# Patient Record
Sex: FEMALE | Race: WHITE | NOT HISPANIC OR LATINO | Employment: OTHER | URBAN - METROPOLITAN AREA
[De-identification: names, ages, dates, MRNs, and addresses within clinical notes are randomized per-mention and may not be internally consistent; named-entity substitution may affect disease eponyms.]

---

## 2017-02-01 ENCOUNTER — HOSPITAL ENCOUNTER (OUTPATIENT)
Dept: GENERAL RADIOLOGY | Facility: HOSPITAL | Age: 82
Discharge: HOME | End: 2017-02-01
Attending: ORTHOPAEDIC SURGERY | Admitting: ORTHOPAEDIC SURGERY

## 2017-02-01 ENCOUNTER — TRANSCRIBE ORDERS (OUTPATIENT)
Dept: GENERAL RADIOLOGY | Facility: HOSPITAL | Age: 82
End: 2017-02-01

## 2017-02-01 DIAGNOSIS — M25.561 ACUTE PAIN OF RIGHT KNEE: Primary | ICD-10-CM

## 2017-02-01 PROCEDURE — 73562 X-RAY EXAM OF KNEE 3: CPT

## 2017-05-01 RX ORDER — RANITIDINE 150 MG/1
TABLET ORAL
Qty: 90 TABLET | Refills: 3 | Status: SHIPPED | OUTPATIENT
Start: 2017-05-01 | End: 2018-01-27 | Stop reason: SDUPTHER

## 2017-06-08 ENCOUNTER — OFFICE VISIT (OUTPATIENT)
Dept: INTERNAL MEDICINE | Facility: CLINIC | Age: 82
End: 2017-06-08

## 2017-06-08 VITALS
TEMPERATURE: 97.1 F | HEART RATE: 64 BPM | SYSTOLIC BLOOD PRESSURE: 120 MMHG | OXYGEN SATURATION: 97 % | WEIGHT: 131.5 LBS | BODY MASS INDEX: 22.45 KG/M2 | HEIGHT: 64 IN | DIASTOLIC BLOOD PRESSURE: 66 MMHG

## 2017-06-08 DIAGNOSIS — E55.9 VITAMIN D DEFICIENCY: ICD-10-CM

## 2017-06-08 DIAGNOSIS — M19.90 ARTHRITIS: ICD-10-CM

## 2017-06-08 DIAGNOSIS — E03.8 OTHER SPECIFIED HYPOTHYROIDISM: Primary | ICD-10-CM

## 2017-06-08 DIAGNOSIS — K21.00 GASTROESOPHAGEAL REFLUX DISEASE WITH ESOPHAGITIS: ICD-10-CM

## 2017-06-08 LAB
25(OH)D3+25(OH)D2 SERPL-MCNC: 61.1 NG/ML
ALBUMIN SERPL-MCNC: 3.9 G/DL (ref 3.5–5)
ALBUMIN/GLOB SERPL: 1.3 G/DL (ref 1–2)
ALP SERPL-CCNC: 68 U/L (ref 38–126)
ALT SERPL-CCNC: 28 U/L (ref 13–69)
AST SERPL-CCNC: 27 U/L (ref 15–46)
BASOPHILS # BLD AUTO: 0.06 10*3/MM3 (ref 0–0.2)
BASOPHILS NFR BLD AUTO: 1 % (ref 0–2.5)
BILIRUB SERPL-MCNC: 0.6 MG/DL (ref 0.2–1.3)
BUN SERPL-MCNC: 22 MG/DL (ref 7–20)
BUN/CREAT SERPL: 24.4 (ref 7.1–23.5)
CALCIUM SERPL-MCNC: 10.2 MG/DL (ref 8.4–10.2)
CHLORIDE SERPL-SCNC: 103 MMOL/L (ref 98–107)
CO2 SERPL-SCNC: 27 MMOL/L (ref 26–30)
CREAT SERPL-MCNC: 0.9 MG/DL (ref 0.6–1.3)
EOSINOPHIL # BLD AUTO: 0.43 10*3/MM3 (ref 0–0.7)
EOSINOPHIL NFR BLD AUTO: 7.2 % (ref 0–7)
ERYTHROCYTE [DISTWIDTH] IN BLOOD BY AUTOMATED COUNT: 14.5 % (ref 11.5–14.5)
GLOBULIN SER CALC-MCNC: 2.9 GM/DL
GLUCOSE SERPL-MCNC: 88 MG/DL (ref 74–98)
HCT VFR BLD AUTO: 40.7 % (ref 37–47)
HGB BLD-MCNC: 13.1 G/DL (ref 12–16)
IMM GRANULOCYTES # BLD: 0.04 10*3/MM3 (ref 0–0.06)
IMM GRANULOCYTES NFR BLD: 0.7 % (ref 0–0.6)
LYMPHOCYTES # BLD AUTO: 1.56 10*3/MM3 (ref 0.6–3.4)
LYMPHOCYTES NFR BLD AUTO: 26 % (ref 10–50)
MCH RBC QN AUTO: 30.9 PG (ref 27–31)
MCHC RBC AUTO-ENTMCNC: 32.2 G/DL (ref 30–37)
MCV RBC AUTO: 96 FL (ref 81–99)
MONOCYTES # BLD AUTO: 0.65 10*3/MM3 (ref 0–0.9)
MONOCYTES NFR BLD AUTO: 10.8 % (ref 0–12)
NEUTROPHILS # BLD AUTO: 3.27 10*3/MM3 (ref 2–6.9)
NEUTROPHILS NFR BLD AUTO: 54.3 % (ref 37–80)
NRBC BLD AUTO-RTO: 0 /100 WBC (ref 0–0)
PLATELET # BLD AUTO: 269 10*3/MM3 (ref 130–400)
POTASSIUM SERPL-SCNC: 4.3 MMOL/L (ref 3.5–5.1)
PROT SERPL-MCNC: 6.8 G/DL (ref 6.3–8.2)
RBC # BLD AUTO: 4.24 10*6/MM3 (ref 4.2–5.4)
SODIUM SERPL-SCNC: 140 MMOL/L (ref 137–145)
T4 FREE SERPL-MCNC: 1.52 NG/DL (ref 0.78–2.19)
TSH SERPL DL<=0.005 MIU/L-ACNC: 2.91 MIU/ML (ref 0.47–4.68)
WBC # BLD AUTO: 6.01 10*3/MM3 (ref 4.8–10.8)

## 2017-06-08 PROCEDURE — 99214 OFFICE O/P EST MOD 30 MIN: CPT | Performed by: INTERNAL MEDICINE

## 2017-06-08 NOTE — PROGRESS NOTES
"Chief Complaint   Patient presents with   • Follow-up     6 months for hypothyroidism and GERD. No new complaints.      Subjective   Malika BEN Rodney is a 81 y.o. female.     HPI Comments: PMH of hypothyroid, GERD, vitD def, DJD and goiter.   She is currently on vitD 1200mg daily.   No CP or SOB. Some edema in ankles.   She eats a lot of healthy food.   Zantac is working well for her GERD.   She takes her thyroid med in Am on empty stomach.  She has no difficulty.   She saw Dr Manley for her right knee pain.  She has had two steroid injections and it is helpful.    She walks some every day at the NH when she walks with her .  No current allergies.        The following portions of the patient's history were reviewed and updated as appropriate: allergies, current medications, past family history, past medical history, past social history, past surgical history and problem list.    Review of Systems   Respiratory: Negative for shortness of breath.    Cardiovascular: Positive for leg swelling. Negative for chest pain and palpitations.   Endocrine: Negative for cold intolerance and heat intolerance.   Musculoskeletal: Positive for arthralgias.   Allergic/Immunologic: Negative for environmental allergies.   All other systems reviewed and are negative.      Objective   /66  Pulse 64  Temp 97.1 °F (36.2 °C)  Ht 64\" (162.6 cm)  Wt 131 lb 8 oz (59.6 kg)  SpO2 97%  BMI 22.57 kg/m2  Body mass index is 22.57 kg/(m^2).  Physical Exam   Constitutional: She is oriented to person, place, and time. She appears well-developed and well-nourished.   HENT:   Head: Normocephalic and atraumatic.   Mouth/Throat: Oropharynx is clear and moist.   Eyes: Conjunctivae and EOM are normal. Pupils are equal, round, and reactive to light.   Neck: Normal range of motion. Neck supple. No thyromegaly present.   Cardiovascular: Normal rate, regular rhythm and intact distal pulses.    Systolic murmur grade 2/6 at upper sternal borders " with radiation to carotids   Pulmonary/Chest: Effort normal and breath sounds normal. No respiratory distress.   Abdominal: Soft. Bowel sounds are normal.   Musculoskeletal: She exhibits no edema.   DIP and PIP finger joints with radial deviation due to osteoarthritis   Lymphadenopathy:     She has no cervical adenopathy.   Neurological: She is alert and oriented to person, place, and time. No cranial nerve deficit.   Psychiatric: She has a normal mood and affect. Judgment normal.   Nursing note and vitals reviewed.      Assessment/Plan   Malika Rodney is here today and the following problems have been addressed:      Malika was seen today for follow-up.    Diagnoses and all orders for this visit:    Other specified hypothyroidism  -     T4, Free  -     TSH    Vitamin D deficiency  -     Vitamin D 25 Hydroxy    Gastroesophageal reflux disease with esophagitis  -     CBC & Differential  -     Comprehensive Metabolic Panel    Arthritis  -     CBC & Differential  -     Comprehensive Metabolic Panel    labs as noted  No current med changes unless based on labs  Follow HH diet  Exercise 30 min 5 days a week  Continue turmeric for arthritis pain    RTC  6 mo or prn    Please note that portions of this note were completed with a voice recognition program.  Efforts were made to edit dictation, but occasionally words are mistranscribed.

## 2017-07-31 RX ORDER — LEVOTHYROXINE SODIUM 0.1 MG/1
TABLET ORAL
Qty: 90 TABLET | Refills: 3 | Status: SHIPPED | OUTPATIENT
Start: 2017-07-31 | End: 2018-02-21

## 2017-09-01 ENCOUNTER — OFFICE VISIT (OUTPATIENT)
Dept: INTERNAL MEDICINE | Facility: CLINIC | Age: 82
End: 2017-09-01

## 2017-09-01 DIAGNOSIS — J30.1 SEASONAL ALLERGIC RHINITIS DUE TO POLLEN: Primary | ICD-10-CM

## 2017-09-01 PROCEDURE — 99213 OFFICE O/P EST LOW 20 MIN: CPT | Performed by: INTERNAL MEDICINE

## 2017-09-05 VITALS
HEART RATE: 61 BPM | SYSTOLIC BLOOD PRESSURE: 136 MMHG | DIASTOLIC BLOOD PRESSURE: 72 MMHG | OXYGEN SATURATION: 98 % | TEMPERATURE: 98.1 F

## 2017-10-18 ENCOUNTER — FLU SHOT (OUTPATIENT)
Dept: INTERNAL MEDICINE | Facility: CLINIC | Age: 82
End: 2017-10-18

## 2017-10-18 PROCEDURE — G0008 ADMIN INFLUENZA VIRUS VAC: HCPCS | Performed by: INTERNAL MEDICINE

## 2017-10-18 PROCEDURE — 90662 IIV NO PRSV INCREASED AG IM: CPT | Performed by: INTERNAL MEDICINE

## 2018-01-22 ENCOUNTER — OFFICE VISIT (OUTPATIENT)
Dept: INTERNAL MEDICINE | Facility: CLINIC | Age: 83
End: 2018-01-22

## 2018-01-22 VITALS
WEIGHT: 133.19 LBS | HEART RATE: 72 BPM | DIASTOLIC BLOOD PRESSURE: 70 MMHG | OXYGEN SATURATION: 96 % | TEMPERATURE: 98.9 F | BODY MASS INDEX: 22.74 KG/M2 | RESPIRATION RATE: 16 BRPM | SYSTOLIC BLOOD PRESSURE: 140 MMHG | HEIGHT: 64 IN

## 2018-01-22 DIAGNOSIS — R03.0 ELEVATED BLOOD PRESSURE READING: ICD-10-CM

## 2018-01-22 DIAGNOSIS — J06.9 VIRAL URI WITH COUGH: Primary | ICD-10-CM

## 2018-01-22 PROCEDURE — 99213 OFFICE O/P EST LOW 20 MIN: CPT | Performed by: NURSE PRACTITIONER

## 2018-01-22 NOTE — PROGRESS NOTES
Chief Complaint / Reason:      Chief Complaint   Patient presents with   • Sinus Problem     onset Sat. during drive him from . Afebrile.    • Sore Throat       Subjective     HPI  Patient presents today with sore throat and sinus issues.  Onset was Saturday and states that symptoms started during the drive from [place] and has progressively worsened.  She states that she has been very fatigued and not eating as well her  recently passed away and she has been dealing with his  services and bills. She denies any fever, chest pain, shortness of breath or heart palpitations.  She states that she had been going every day to the nursing home to visit her  and that the flu along with other respiratory infections were going around.  She states while her visit to [place] was very short the air was very cold and her lips have been chapped since.  She has reflected times when talking about her .  She lives alone but she has a daughter and son-in-law and granddaughter that lives here.    History taken from: patient    PMH/FH/Social History were reviewed and updated appropriately in the electronic medical record.     Review of Systems:   Review of Systems   Constitutional: Positive for fatigue. Negative for chills and fever.   HENT: Positive for congestion and sore throat.    Respiratory: Positive for cough.    Cardiovascular: Negative.  Negative for chest pain and palpitations.   Gastrointestinal: Negative.    Musculoskeletal: Negative.    Skin: Negative.    Neurological: Positive for headaches.   Hematological: Negative for adenopathy.   Psychiatric/Behavioral: Positive for dysphoric mood. Negative for self-injury, sleep disturbance and suicidal ideas.     All other systems were reviewed and are negative.  Exceptions are noted in the subjective or above.      Objective     Vital Signs  Vitals:    18 1521   BP: 140/70   Pulse: 72   Resp: 16   Temp: 98.9 °F (37.2 °C)   SpO2: 96%        Body mass index is 22.86 kg/(m^2).    Physical Exam   Constitutional: She is oriented to person, place, and time. She appears well-developed and well-nourished.   HENT:   Head: Normocephalic.   Right Ear: External ear normal.   Left Ear: External ear normal.   Mouth/Throat: Mucous membranes are dry. Posterior oropharyngeal erythema present.   Cardiovascular: Normal rate, regular rhythm, normal heart sounds and intact distal pulses.    Pulmonary/Chest: Effort normal and breath sounds normal.   Abdominal: Soft. Bowel sounds are normal.   Lymphadenopathy:     She has no cervical adenopathy.   Neurological: She is alert and oriented to person, place, and time.   Skin: Skin is warm and dry.   Psychiatric: She has a normal mood and affect. Her behavior is normal. Judgment and thought content normal.   Nursing note and vitals reviewed.    Medication Review:     Current Outpatient Prescriptions:   •  Ascorbic Acid (VITAMIN C) 500 MG capsule, Take  by mouth daily., Disp: , Rfl:   •  Calcium Carb-Cholecalciferol (CALCIUM + D3) 600-200 MG-UNIT tablet, Take  by mouth daily., Disp: , Rfl:   •  Cholecalciferol (VITAMIN D-3) 1000 UNITS capsule, Take  by mouth daily., Disp: , Rfl:   •  levothyroxine (SYNTHROID, LEVOTHROID) 100 MCG tablet, Take 1 tablet by mouth  daily, Disp: 90 tablet, Rfl: 3  •  MULTIPLE VITAMINS PO, Take  by mouth daily., Disp: , Rfl:   •  Naproxen Sodium (ALEVE PO), Take  by mouth., Disp: , Rfl:   •  raNITIdine (ZANTAC) 150 MG tablet, Take 1 tablet by mouth  daily, Disp: 90 tablet, Rfl: 3  •  Turmeric 450 MG capsule, Take  by mouth., Disp: , Rfl:     Assessment/Plan   Malika was seen today for sinus problem and sore throat.    Diagnoses and all orders for this visit:    Viral URI with cough  Treat symptomatically at this time and advised patient to increase water intake and rest.  Recommend patient get adequate nutrition and contact office if symptoms worsen.  Elevated blood pressure reading  Patient  closely monitor blood pressure at home and contact office if blood pressure remains elevated    Will contact patient on Wednesday to see how patient is doing.   Return if symptoms worsen or fail to improve.    Elizabeth Young, APRN  01/22/2018

## 2018-01-24 ENCOUNTER — OFFICE VISIT (OUTPATIENT)
Dept: INTERNAL MEDICINE | Facility: CLINIC | Age: 83
End: 2018-01-24

## 2018-01-24 ENCOUNTER — TELEPHONE (OUTPATIENT)
Dept: INTERNAL MEDICINE | Facility: CLINIC | Age: 83
End: 2018-01-24

## 2018-01-24 VITALS
SYSTOLIC BLOOD PRESSURE: 116 MMHG | OXYGEN SATURATION: 96 % | BODY MASS INDEX: 22.32 KG/M2 | TEMPERATURE: 98.9 F | RESPIRATION RATE: 14 BRPM | HEIGHT: 64 IN | WEIGHT: 130.75 LBS | DIASTOLIC BLOOD PRESSURE: 60 MMHG | HEART RATE: 70 BPM

## 2018-01-24 DIAGNOSIS — J10.1 INFLUENZA A: Primary | ICD-10-CM

## 2018-01-24 LAB
EXPIRATION DATE: ABNORMAL
FLUAV AG NPH QL: ABNORMAL
FLUBV AG NPH QL: ABNORMAL
INTERNAL CONTROL: ABNORMAL
Lab: ABNORMAL

## 2018-01-24 PROCEDURE — 87804 INFLUENZA ASSAY W/OPTIC: CPT | Performed by: NURSE PRACTITIONER

## 2018-01-24 PROCEDURE — 99213 OFFICE O/P EST LOW 20 MIN: CPT | Performed by: NURSE PRACTITIONER

## 2018-01-24 RX ORDER — OSELTAMIVIR PHOSPHATE 75 MG/1
75 CAPSULE ORAL 2 TIMES DAILY
Qty: 10 CAPSULE | Refills: 0 | Status: SHIPPED | OUTPATIENT
Start: 2018-01-24 | End: 2018-01-29

## 2018-01-24 RX ORDER — DEXTROMETHORPHAN HYDROBROMIDE AND PROMETHAZINE HYDROCHLORIDE 15; 6.25 MG/5ML; MG/5ML
5 SYRUP ORAL NIGHTLY PRN
Qty: 180 ML | Refills: 0 | Status: SHIPPED | OUTPATIENT
Start: 2018-01-24 | End: 2018-02-21

## 2018-01-24 NOTE — TELEPHONE ENCOUNTER
Spoke with Sally, she said that pt. can come in for a flu test only, a visit or we can call in meds. Up to her. She asked to schedule an appt.

## 2018-01-24 NOTE — TELEPHONE ENCOUNTER
Checked on pt. Coughing hard, not productive. Stomach muscles are sore. Hard to swallow. Now with fevers of 100-101. Recheck or rx?

## 2018-01-24 NOTE — PROGRESS NOTES
Chief Complaint / Reason:      Chief Complaint   Patient presents with   • Cough     recheck, sx worsening   • Fever     between 100-101   • Sore Throat       Subjective     HPI  She presents today with complaints of cough fever and sore throat.  She states that her fever has been between 100 and 101 and she feels like her cough has been worsening.  She was previously seen on Monday 1/22/18 for sinus issues and cough.  I had instructed her that we will contact her on Wednesday to see if she was feeling any better that I was concerned that she may have the flu but at that point she was afebrile and denied any muscle aches or chills.  Today I had advised her to stop by the office and we would do a flu test but she insisted on being seen.  She denies chest pain, shortness of breath or heart palpitations.  She states that she has been coughing and it is interfering with her sleep.  Patient has not been taking care of herself very well as she has not been eating a whole lot or maintaining hydration but possibly could be related to her psychological stress of recently losing her  in addition to not feeling well.  Patient's vital signs are currently stable.  Patient states that she has taken over-the-counter medications to help relieve symptoms.  The fever started yesterday.   History taken from: patient    PMH/FH/Social History were reviewed and updated appropriately in the electronic medical record.     Review of Systems:   Review of Systems   Constitutional: Positive for fatigue and fever.   HENT: Positive for congestion, sore throat and voice change.    Respiratory: Positive for cough. Negative for shortness of breath and wheezing.    Cardiovascular: Negative for chest pain, palpitations and leg swelling.   Musculoskeletal: Positive for myalgias.   Skin: Negative.      All other systems were reviewed and are negative.  Exceptions are noted in the subjective or above.      Objective     Vital Signs  Vitals:     01/24/18 1357   BP: 116/60   Pulse: 70   Resp: 14   Temp: 98.9 °F (37.2 °C)   SpO2: 96%       Body mass index is 22.44 kg/(m^2).    Physical Exam   Constitutional: She is oriented to person, place, and time. She appears well-developed and well-nourished.   HENT:   Head: Normocephalic.   Right Ear: External ear normal. Tympanic membrane is erythematous and bulging.   Left Ear: External ear normal. Tympanic membrane is erythematous and bulging.   Nose: Mucosal edema present. Right sinus exhibits maxillary sinus tenderness. Left sinus exhibits maxillary sinus tenderness.   Mouth/Throat: Mucous membranes are dry. Posterior oropharyngeal erythema present.   Cardiovascular: Normal rate, regular rhythm, normal heart sounds and intact distal pulses.    Pulmonary/Chest: Effort normal. She has decreased breath sounds in the right lower field and the left lower field. She has no wheezes. She has no rhonchi. She has no rales.   Abdominal: Soft. Bowel sounds are normal.   Lymphadenopathy:     She has no cervical adenopathy.   Neurological: She is alert and oriented to person, place, and time.   Skin: Skin is warm and dry.   Psychiatric: She has a normal mood and affect. Her behavior is normal. Judgment and thought content normal.   Nursing note and vitals reviewed.       Results Review:    I reviewed the patient's new clinical results.   Office Visit on 01/24/2018   Component Date Value Ref Range Status   • Rapid Influenza A Ag 01/24/2018 pos.   Final   • Rapid Influenza B Ag 01/24/2018 neg.   Final   • Internal Control 01/24/2018 Passed  Passed Final   • Lot Number 01/24/2018 [id number]   Final   • Expiration Date 01/24/2018 2/10/20   Final       Medication Review:     Current Outpatient Prescriptions:   •  Ascorbic Acid (VITAMIN C) 500 MG capsule, Take  by mouth daily., Disp: , Rfl:   •  Calcium Carb-Cholecalciferol (CALCIUM + D3) 600-200 MG-UNIT tablet, Take  by mouth daily., Disp: , Rfl:   •  Cholecalciferol (VITAMIN D-3) 1000  UNITS capsule, Take  by mouth daily., Disp: , Rfl:   •  levothyroxine (SYNTHROID, LEVOTHROID) 100 MCG tablet, Take 1 tablet by mouth  daily, Disp: 90 tablet, Rfl: 3  •  MULTIPLE VITAMINS PO, Take  by mouth daily., Disp: , Rfl:   •  Naproxen Sodium (ALEVE PO), Take  by mouth., Disp: , Rfl:   •  raNITIdine (ZANTAC) 150 MG tablet, Take 1 tablet by mouth  daily, Disp: 90 tablet, Rfl: 3  •  Turmeric 450 MG capsule, Take  by mouth., Disp: , Rfl:   •  oseltamivir (TAMIFLU) 75 MG capsule, Take 1 capsule by mouth 2 (Two) Times a Day for 5 days., Disp: 10 capsule, Rfl: 0  •  promethazine-dextromethorphan (PROMETHAZINE-DM) 6.25-15 MG/5ML syrup, Take 5 mL by mouth At Night As Needed for Cough., Disp: 180 mL, Rfl: 0    Assessment/Plan   Malika was seen today for cough, fever and sore throat.    Diagnoses and all orders for this visit:    Influenza A  -     oseltamivir (TAMIFLU) 75 MG capsule; Take 1 capsule by mouth 2 (Two) Times a Day for 5 days.  -     promethazine-dextromethorphan (PROMETHAZINE-DM) 6.25-15 MG/5ML syrup; Take 5 mL by mouth At Night As Needed for Cough.  -     POCT Influenza A/B    Treat symptomatically including resting at home, increased fluid intake, analgesics and antipyretics.  Good handwashing and oral hygiene. Replace toothbrush.  Avoid spreading the flu by covering your mouth.  Recommend getting the flu vaccine every year.    Return if symptoms worsen or fail to improve.    Elizabeth Young, APRN  01/24/2018

## 2018-01-29 RX ORDER — RANITIDINE 150 MG/1
TABLET ORAL
Qty: 90 TABLET | Refills: 3 | Status: SHIPPED | OUTPATIENT
Start: 2018-01-29 | End: 2019-01-30 | Stop reason: SDUPTHER

## 2018-02-20 DIAGNOSIS — M25.561 RIGHT KNEE PAIN, UNSPECIFIED CHRONICITY: Primary | ICD-10-CM

## 2018-02-21 ENCOUNTER — OFFICE VISIT (OUTPATIENT)
Dept: ORTHOPEDIC SURGERY | Facility: CLINIC | Age: 83
End: 2018-02-21

## 2018-02-21 VITALS — HEIGHT: 64 IN | BODY MASS INDEX: 22.96 KG/M2 | WEIGHT: 134.5 LBS | RESPIRATION RATE: 16 BRPM

## 2018-02-21 DIAGNOSIS — M17.11 PRIMARY OSTEOARTHRITIS OF RIGHT KNEE: Primary | ICD-10-CM

## 2018-02-21 DIAGNOSIS — M21.161 ACQUIRED VARUS DEFORMITY KNEE, RIGHT: ICD-10-CM

## 2018-02-21 DIAGNOSIS — M19.041 PRIMARY OSTEOARTHRITIS OF BOTH HANDS: ICD-10-CM

## 2018-02-21 DIAGNOSIS — M25.561 ARTHRALGIA OF RIGHT KNEE: ICD-10-CM

## 2018-02-21 DIAGNOSIS — M19.042 PRIMARY OSTEOARTHRITIS OF BOTH HANDS: ICD-10-CM

## 2018-02-21 DIAGNOSIS — M15.9 GENERALIZED OSTEOARTHRITIS: ICD-10-CM

## 2018-02-21 PROCEDURE — 99203 OFFICE O/P NEW LOW 30 MIN: CPT | Performed by: ORTHOPAEDIC SURGERY

## 2018-02-21 PROCEDURE — 73560 X-RAY EXAM OF KNEE 1 OR 2: CPT | Performed by: ORTHOPAEDIC SURGERY

## 2018-02-21 PROCEDURE — 20610 DRAIN/INJ JOINT/BURSA W/O US: CPT | Performed by: ORTHOPAEDIC SURGERY

## 2018-02-21 RX ORDER — LEVOTHYROXINE SODIUM 88 UG/1
88 TABLET ORAL DAILY
COMMUNITY
End: 2018-06-18

## 2018-02-21 RX ADMIN — METHYLPREDNISOLONE ACETATE 40 MG: 40 INJECTION, SUSPENSION INTRA-ARTICULAR; INTRALESIONAL; INTRAMUSCULAR; SOFT TISSUE at 10:29

## 2018-02-21 RX ADMIN — LIDOCAINE HYDROCHLORIDE 2 ML: 10 INJECTION, SOLUTION INFILTRATION; PERINEURAL at 10:29

## 2018-04-01 RX ORDER — METHYLPREDNISOLONE ACETATE 40 MG/ML
40 INJECTION, SUSPENSION INTRA-ARTICULAR; INTRALESIONAL; INTRAMUSCULAR; SOFT TISSUE
Status: COMPLETED | OUTPATIENT
Start: 2018-02-21 | End: 2018-02-21

## 2018-04-01 RX ORDER — LIDOCAINE HYDROCHLORIDE 10 MG/ML
2 INJECTION, SOLUTION INFILTRATION; PERINEURAL
Status: COMPLETED | OUTPATIENT
Start: 2018-02-21 | End: 2018-02-21

## 2018-04-25 ENCOUNTER — OFFICE VISIT (OUTPATIENT)
Dept: INTERNAL MEDICINE | Facility: CLINIC | Age: 83
End: 2018-04-25

## 2018-04-25 VITALS
SYSTOLIC BLOOD PRESSURE: 120 MMHG | HEART RATE: 73 BPM | OXYGEN SATURATION: 95 % | TEMPERATURE: 98.3 F | DIASTOLIC BLOOD PRESSURE: 66 MMHG

## 2018-04-25 DIAGNOSIS — M79.672 LEFT FOOT PAIN: Primary | ICD-10-CM

## 2018-04-25 LAB — URATE SERPL-MCNC: 5.5 MG/DL (ref 2.5–8.5)

## 2018-04-25 PROCEDURE — 99213 OFFICE O/P EST LOW 20 MIN: CPT | Performed by: INTERNAL MEDICINE

## 2018-04-25 NOTE — PROGRESS NOTES
Please tell patient uric and we will call her with that result. acid level is in normal range.  I am awaiting x-ray report and we will call her with that result.

## 2018-04-25 NOTE — PROGRESS NOTES
Chief Complaint   Patient presents with   • Foot Swelling     left.      Subjective   Fanta Rodney is a 82 y.o. female.     Patient is here today with complaints of left foot swelling.  She went to visit several people in NH on Sunday and on Monday had pain and swelling in left foot.   She has some right knee chronic pain and takes aleve for this daily twice with food.    She has not applied heat to foot.    Her foot is feeling better, less swollen today.         The following portions of the patient's history were reviewed and updated as appropriate: allergies, current medications, past family history, past medical history, past social history, past surgical history and problem list.    Review of Systems   Constitutional: Negative for chills and fever.   Musculoskeletal: Positive for arthralgias and joint swelling. Negative for gait problem.   Skin: Negative for rash.       Objective   /66   Pulse 73   Temp 98.3 °F (36.8 °C)   SpO2 95%   There is no height or weight on file to calculate BMI.  Physical Exam   Constitutional: She is oriented to person, place, and time. She appears well-developed and well-nourished.   HENT:   Head: Normocephalic and atraumatic.   Eyes: EOM are normal. Pupils are equal, round, and reactive to light.   Pulmonary/Chest: Effort normal.   Musculoskeletal:   Left lower extremity: Left foot with mild inflammation over her midfoot area, pain with inversion and eversion of midfoot, no pain with movement of hind foot or forefoot  Right lower extremity: Knee with significant crepitus and inflammation noted, no effusion or ballottement   Neurological: She is alert and oriented to person, place, and time.   Psychiatric: She has a normal mood and affect. Her behavior is normal. Judgment and thought content normal.   Nursing note and vitals reviewed.      Assessment/Plan   Fanta Rodney is here today and the following problems have been addressed:      Fanta was seen today for  foot swelling.    Diagnoses and all orders for this visit:    Left foot pain  -     XR Foot 3+ View Left; Future  -     Uric Acid    XR foot to rule out stress fracture  Check uric acid level  Elevate foot as tolerated  Heat to left foot 3 times a day  Take aleve with food  We will contact patient with above results when available    RTC prn    Please note that portions of this note were completed with a voice recognition program.  Efforts were made to edit dictation, but occasionally words are mistranscribed.

## 2018-04-26 ENCOUNTER — TELEPHONE (OUTPATIENT)
Dept: INTERNAL MEDICINE | Facility: CLINIC | Age: 83
End: 2018-04-26

## 2018-04-26 ENCOUNTER — HOSPITAL ENCOUNTER (OUTPATIENT)
Dept: GENERAL RADIOLOGY | Facility: HOSPITAL | Age: 83
Discharge: HOME | End: 2018-04-26
Attending: INTERNAL MEDICINE | Admitting: INTERNAL MEDICINE

## 2018-04-26 DIAGNOSIS — M79.672 LEFT FOOT PAIN: ICD-10-CM

## 2018-04-26 PROCEDURE — 73630 X-RAY EXAM OF FOOT: CPT

## 2018-04-26 NOTE — TELEPHONE ENCOUNTER
Patient is requesting a handicapped paper be filled out for patient to get a parking sticker. Patient is requesting a call when it is ready to be picked up.

## 2018-04-27 ENCOUNTER — TELEPHONE (OUTPATIENT)
Dept: INTERNAL MEDICINE | Facility: CLINIC | Age: 83
End: 2018-04-27

## 2018-04-27 NOTE — PROGRESS NOTES
Please call patient with x-ray.  It appears as though she has had a fracture in third bone of foot that is healing well.  In addition it appears as though she has arthritis in the middle of her foot that may be osteoarthritis versus gout.  Her gout lab was normal.  The fact that her arthritis or foot pain is improving makes me suspect this is osteoarthritis or normal wear and tear.  Please continue with plan made during office visit.

## 2018-04-27 NOTE — TELEPHONE ENCOUNTER
----- Message from Marilyn K Vermeesch, MD sent at 4/25/2018  5:08 PM EDT -----  Please tell patient uric and we will call her with that result. acid level is in normal range.  I am awaiting x-ray report and we will call her with that result.

## 2018-06-18 ENCOUNTER — OFFICE VISIT (OUTPATIENT)
Dept: ORTHOPEDIC SURGERY | Facility: CLINIC | Age: 83
End: 2018-06-18

## 2018-06-18 VITALS — HEIGHT: 64 IN | WEIGHT: 135 LBS | RESPIRATION RATE: 18 BRPM | BODY MASS INDEX: 23.05 KG/M2

## 2018-06-18 DIAGNOSIS — M19.072 ARTHROSIS OF MIDFOOT, LEFT: Primary | ICD-10-CM

## 2018-06-18 DIAGNOSIS — M21.161 ACQUIRED VARUS DEFORMITY KNEE, RIGHT: ICD-10-CM

## 2018-06-18 DIAGNOSIS — M25.561 ARTHRALGIA OF RIGHT KNEE: ICD-10-CM

## 2018-06-18 DIAGNOSIS — M15.9 GENERALIZED OSTEOARTHRITIS: ICD-10-CM

## 2018-06-18 DIAGNOSIS — M19.072 PRIMARY OSTEOARTHRITIS OF LEFT FOOT: ICD-10-CM

## 2018-06-18 DIAGNOSIS — M17.11 PRIMARY OSTEOARTHRITIS OF RIGHT KNEE: ICD-10-CM

## 2018-06-18 PROCEDURE — 99213 OFFICE O/P EST LOW 20 MIN: CPT | Performed by: ORTHOPAEDIC SURGERY

## 2018-06-18 PROCEDURE — 73630 X-RAY EXAM OF FOOT: CPT | Performed by: ORTHOPAEDIC SURGERY

## 2018-06-18 RX ORDER — CETIRIZINE HYDROCHLORIDE 10 MG/1
10 TABLET ORAL DAILY
COMMUNITY
End: 2018-08-06 | Stop reason: SDUPTHER

## 2018-06-18 RX ORDER — LEVOTHYROXINE SODIUM 0.1 MG/1
88 TABLET ORAL DAILY
COMMUNITY
Start: 2018-04-28 | End: 2018-08-05 | Stop reason: SDUPTHER

## 2018-06-18 RX ORDER — NAPROXEN SODIUM 220 MG
220 TABLET ORAL 2 TIMES DAILY PRN
COMMUNITY
End: 2018-07-27 | Stop reason: HOSPADM

## 2018-06-18 NOTE — PROGRESS NOTES
Fanta Rodney is a 82 y.o. right hand dominant female is here today for a pre-operative history and physical discussion of treatment plans, surgery, and rehabilitation.  Pain and Follow-up of the Left Foot and Follow-up and Pain of the Right Knee        History of Present Illness      HPI Comments: Patient has had chronic right knee pain for several years. She was being seen by Dr. Manley and receiving injections. Her last cortisone injection was on 10/18/17. She also take aleve with good results.      Pain   This is a chronic right knee problem. The current episode started more than 1 year ago. The problem occurs intermittently. The problem has been unchanged. Associated symptoms include arthralgias. Pertinent negatives include no abdominal pain, chest pain, fever, joint swelling or rash. The symptoms are aggravated by walking and standing. She has tried NSAIDs and rest (aleve) for the symptoms. The treatment provided moderate relief.      Left midfoot pain:    Patient reports a bout of left midfoot pain this spring that has now subsided.  Radiographs were done and there was noted advanced midfoot arthritis and a possible third metatarsal shaft fracture.  The diffuse midfoot arthritis is advanced with joint thickening, spurs and erosive changes.  This raises the question of an inflammatory component to her arthritis or even Charcot neuropathic midfoot, though she denies any history of diabetes, rheumatoid, rheumatic fever, psoriasis, irritable bowel, lupus or any other autoimmune disorders.    We discussed repeating her foot x-rays today and if no third metatarsal callus or healing, and as she had improved without fracture care, then the midfoot arthritis changes are likely chronic. We also discussed obtaining a screening blood lab arthritis panel and she is agreeable.  This can be done as part of her pre-op labs as she would like to proceed with elective right total knee replacement.    PAST MEDICAL  HISTORY:    Past Medical History:   Diagnosis Date   • Arthritis    • Choledocholithiasis    • Disease of thyroid gland    • GERD (gastroesophageal reflux disease)    • History of esophagogastroduodenoscopy (EGD) 09/06/2013   • History of toe fracture 10/2012    Left middle toe   • Nausea with vomiting    • Osteoarthritis    • Stress fracture of metatarsal bone 06/02/2013    Left Foot - Treated by Dr. Manley   • Tear of meniscus of knee    • Urinary tract infection          Current Outpatient Prescriptions:   •  cetirizine (zyrTEC) 10 MG tablet, Take 10 mg by mouth Daily., Disp: , Rfl:   •  naproxen sodium (ALEVE) 220 MG tablet, Take 220 mg by mouth 2 (Two) Times a Day As Needed., Disp: , Rfl:   •  Ascorbic Acid (VITAMIN C) 500 MG capsule, Take  by mouth daily., Disp: , Rfl:   •  Calcium Carb-Cholecalciferol (CALCIUM + D3) 600-200 MG-UNIT tablet, Take  by mouth daily., Disp: , Rfl:   •  Cholecalciferol (VITAMIN D-3) 1000 UNITS capsule, Take  by mouth daily., Disp: , Rfl:   •  levothyroxine (SYNTHROID, LEVOTHROID) 100 MCG tablet, , Disp: , Rfl:   •  MULTIPLE VITAMINS PO, Take  by mouth daily., Disp: , Rfl:   •  raNITIdine (ZANTAC) 150 MG tablet, TAKE 1 TABLET BY MOUTH  DAILY, Disp: 90 tablet, Rfl: 3  •  Turmeric 450 MG capsule, Take  by mouth., Disp: , Rfl:     Past Surgical History:   Procedure Laterality Date   • ABDOMINAL SURGERY     • CATARACT EXTRACTION Right 08/31/2016    Dr. Michele Damian   • CATARACT EXTRACTION Left 09/29/2016    Dr. Michele Damian    • CHOLECYSTECTOMY  09/27/2013    Dr. Alexis Lobo   • COLONOSCOPY  02/08/2013    Dr. Alexis Gary   • ENDOSCOPY     • FINGER SURGERY Right 08/17/2006    2 fingers - joint replacement - Dr. Suh - [facility]   • THYROIDECTOMY  10/09/1967    Dr. Christiano Beard - [facility] - Diseased Thyroid (Goiter)       Family History   Problem Relation Age of Onset   • Cancer Mother    • Osteoarthritis Mother    • Cancer Father        Social History     Social History   • Marital  "status:      Spouse name: N/A   • Number of children: N/A   • Years of education: N/A     Occupational History   • Not on file.     Social History Main Topics   • Smoking status: Never Smoker   • Smokeless tobacco: Never Used   • Alcohol use Yes   • Drug use: No   • Sexual activity: Defer     Other Topics Concern   • Not on file     Social History Narrative   • No narrative on file        Allergies   Allergen Reactions   • Nexium [Esomeprazole Magnesium] GI Intolerance       Review of Systems   Constitutional: Negative for fever.   HENT: Negative for voice change.    Eyes: Negative for visual disturbance.   Respiratory: Negative for shortness of breath.    Cardiovascular: Negative for chest pain.   Gastrointestinal: Negative for abdominal distention and abdominal pain.   Genitourinary: Negative for dysuria.   Musculoskeletal: Positive for arthralgias. Negative for gait problem and joint swelling.   Skin: Negative for rash.   Neurological: Negative for speech difficulty.   Hematological: Does not bruise/bleed easily.   Psychiatric/Behavioral: Negative for confusion.       PHYSICAL EXAMINATION:       Resp 18   Ht 162.6 cm (64\")   Wt 61.2 kg (135 lb)   BMI 23.17 kg/m²     GENERAL [x] Well developed  []Ill appearing [x] No acute distress    HEENT [x]No acute changes [x] Normocephalic, atraumatic    NECK [x]Supple  [] No midline tenderness    LUNGS [x]Clear bilaterally [x]No wheezes []Rhonchi [] Rales    HEART [x] Regular rate  [x] Regular rhythm [] Irregular    ABDOMEN [x] Soft [x] Not tender [x] Not distended [x] Normal sounds    VAS/EXT [x] Normal Pulses []Edema []Cyanosis             SKIN [x] Warm [x]Dry []Pink []Ecchymosis []Cool    NEURO [x] Sensation Intact [x] Motor Intact [x] Pulse intact  [] Dysesthesias:_________  []Weakness:__________    MUSCULOSKELETAL []          Physical Exam   Constitutional: She appears well-developed. No distress.   HENT:   Head: Normocephalic and atraumatic.   Eyes: EOM are " normal. Pupils are equal, round, and reactive to light.   Neck: Neck supple. No tracheal deviation present.   Cardiovascular: Normal rate and regular rhythm.    Pulmonary/Chest: Breath sounds normal. No respiratory distress. She has no wheezes.   Abdominal: Soft. Bowel sounds are normal. She exhibits no distension. There is no tenderness.   Musculoskeletal: She exhibits deformity (generallized osteoarthritis and in particular advance angular deformities of PIP joints of digits.).        Right knee: She exhibits effusion (1+).   Neurological: She is alert.   Skin: Skin is warm and dry.   Psychiatric: She has a normal mood and affect. Her speech is normal.   Vitals reviewed.    Right Knee Exam     Tenderness   The patient is experiencing tenderness in the medial retinaculum, lateral retinaculum, medial joint line and patella.    Range of Motion   Extension: -10 (extension stiffness)   Flexion: 90     Tests     Valgus: negative  Patellar Apprehension: negative    Other   Erythema: absent  Pulse: present  Other tests: effusion (1+) present          Extremity DVT signs are Negative on physical exam with negative Rosa sign, with no calf pain and with no palpable cords   Neurologic Exam     Mental Status   Attention: normal.   Speech: speech is normal   Level of consciousness: alert  Knowledge: good.     Cranial Nerves     CN III, IV, VI   Pupils are equal, round, and reactive to light.  Extraocular motions are normal.     Motor Exam   Overall muscle tone: normal    Gait, Coordination, and Reflexes     Gait  Gait: (right knee varus posture and antalgic limp)    Ortho Exam      DVT Screening: No Yes   Smoker [x] []   Personal/Family History of DVT or PE [x] []   Sedentary Lifestyle [x] []   BMI >30 [x] []      Tranexamic acid TXA criteria for usage during arthroplasty surgery.        Previous or Active DVT/PE: NO  Cardiac Stent within 1 year: NO  Embolic/Ischemic stroke within 1 year: NO  Creatinine less than 30ml/min:  NO  Congenital Thrombophilia: NO  Hypersensitivity to TXA: NO  Color Blindness: NO  NOTE:  TXA  tranexamic acid summary: THIS PATIENT IS A CANDIDATE FOR IV TXA DURING SURGERY.    Independent Review of Radiographic Studies:    3 views of left foot, indication to evaluate pain and with prior comparison views, shows no acute fracture or disclocation evident.  There was a question of a third metetarsal fracture on recent imaging though lack of any callus or periostitis on today's imaging suggests chronic arthritis with a possible old healed metatarsal fracture.  Also, indication to evaluate joint condition, and compared with prior images, shows evident chronic severe endstage osteoarthritis across the entire left midfoot..  Laboratory and Other Studies:  No new results reviewed today.   Medical Decision Making:    Limited progress with persistent and/or progressive symptoms.  Continue current management and any additional treatments and workup as outlined in plan.  Surgical treatment of acute or chronic injury condition., Medications as prescribed and only as tolerated. and Physical and occupational therapy planned.          Fanta was seen today for pain, follow-up, follow-up and pain.    Diagnoses and all orders for this visit:    Arthrosis of midfoot, left  -     XR Foot 3+ View Left    Other secondary osteoarthritis of left foot  -     Uric acid; Future  -     Rheumatoid factor; Future  -     C-reactive protein; Future  -     SOFÍA; Future  -     Sedimentation Rate; Future  -     HLA-B27 Antigen; Future    Primary osteoarthritis of right knee    Arthralgia of right knee    Acquired varus deformity knee, right    Generalized osteoarthritis         *SPECIAL INSTRUCTIONS:  Multi-modal analgesia.  Tranexamic acid IV protocol (see screening parameters this report).  Multi-modal DVT prophylaxis.  Rehabilitation PT/OT protocol with same day walker ambulation with therapist.      Risks, benefits, and alternative treatments  discussed with the patient: [x] Yes [] No    Risk benefits and merits of the proposed surgery were discussed and the patient's questions were answered risks discussed including and not limited to:  Anesthesia reactions  Blood loss and possible transfusion  Infection  Deep venous thrombosis and pulmonary embolus  Nerve, vascular or tendon injury  Fracture  Deformity  Stiffness  Weakness  Prosthesis and implant issues such as loosening, material wear or dislocation  Skin necrosis  Revision surgery or further treatment  Recurrence of problem and condition     Informed consent: [] Signed  [x] To be obtained at hospital  [] Both    Recommendations/Plan:  Discussion of orthopaedic goals and activities and patient and/or guardian expressed appreciation.  Risk, benefits, and merits of treatment alternatives reviewed with the patient and/or guardian and questions answered  Regular exercise as tolerated  Guided on proper techniques for mobility, strength, agility and/or conditioning exercises  After care and dental prophylaxis for joint replacement prosthesis      Exercise, medications, injections, other patient advice, and return appointment as noted.  Brace: No brace was given at today's visit  Referral: No referrals made at today's visit  Test/Studies: No additional studies ordered.  Surgery: No surgery proposed at this visit. and For intractable painful limiting condition, patient to consider elective surgical options.  Work/Activity Status: May perform usual activities as tolerated and No strenuous activity.  Patient and/or guardian is encouraged to call or return for any issues or concerns.    PLANNED SURGICAL PROCEDURE: Elective right total knee replacement.    Return in about 6 weeks (around 7/30/2018) for Post-op, recheck.

## 2018-06-28 ENCOUNTER — PREP FOR SURGERY (OUTPATIENT)
Dept: OTHER | Facility: HOSPITAL | Age: 83
End: 2018-06-28

## 2018-06-28 DIAGNOSIS — M17.11 PRIMARY OSTEOARTHRITIS OF RIGHT KNEE: Primary | ICD-10-CM

## 2018-07-09 ENCOUNTER — APPOINTMENT (OUTPATIENT)
Dept: PREADMISSION TESTING | Facility: HOSPITAL | Age: 83
End: 2018-07-09

## 2018-07-09 ENCOUNTER — HOSPITAL ENCOUNTER (OUTPATIENT)
Dept: GENERAL RADIOLOGY | Facility: HOSPITAL | Age: 83
Discharge: HOME | End: 2018-07-09
Admitting: ORTHOPAEDIC SURGERY

## 2018-07-09 VITALS — HEIGHT: 64 IN | BODY MASS INDEX: 22.88 KG/M2 | WEIGHT: 134 LBS

## 2018-07-09 DIAGNOSIS — M17.11 PRIMARY OSTEOARTHRITIS OF RIGHT KNEE: ICD-10-CM

## 2018-07-09 DIAGNOSIS — M19.072 PRIMARY OSTEOARTHRITIS OF LEFT FOOT: ICD-10-CM

## 2018-07-09 LAB
ABO GROUP BLD: NORMAL
ALBUMIN SERPL-MCNC: 4.1 G/DL (ref 3.5–5)
ALBUMIN/GLOB SERPL: 1.2 G/DL (ref 1–2)
ALP SERPL-CCNC: 77 U/L (ref 38–126)
ALT SERPL W P-5'-P-CCNC: 24 U/L (ref 13–69)
ANION GAP SERPL CALCULATED.3IONS-SCNC: 12.2 MMOL/L (ref 10–20)
APTT PPP: 29.7 SECONDS (ref 25–36)
AST SERPL-CCNC: 30 U/L (ref 15–46)
BACTERIA UR QL AUTO: ABNORMAL /HPF
BASOPHILS # BLD AUTO: 0.08 10*3/MM3 (ref 0–0.2)
BASOPHILS NFR BLD AUTO: 1 % (ref 0–2.5)
BILIRUB SERPL-MCNC: 0.5 MG/DL (ref 0.2–1.3)
BILIRUB UR QL STRIP: NEGATIVE
BUN BLD-MCNC: 21 MG/DL (ref 7–20)
BUN/CREAT SERPL: 26.3 (ref 7.1–23.5)
CALCIUM SPEC-SCNC: 10 MG/DL (ref 8.4–10.2)
CHLORIDE SERPL-SCNC: 104 MMOL/L (ref 98–107)
CLARITY UR: CLEAR
CO2 SERPL-SCNC: 28 MMOL/L (ref 26–30)
COLOR UR: YELLOW
CREAT BLD-MCNC: 0.8 MG/DL (ref 0.6–1.3)
CRP SERPL-MCNC: <0.5 MG/DL (ref 0–1)
DEPRECATED RDW RBC AUTO: 51 FL (ref 37–54)
EOSINOPHIL # BLD AUTO: 0.5 10*3/MM3 (ref 0–0.7)
EOSINOPHIL NFR BLD AUTO: 6.4 % (ref 0–7)
ERYTHROCYTE [DISTWIDTH] IN BLOOD BY AUTOMATED COUNT: 14.7 % (ref 11.5–14.5)
ERYTHROCYTE [SEDIMENTATION RATE] IN BLOOD: 16 MM/HR (ref 0–20)
GFR SERPL CREATININE-BSD FRML MDRD: 69 ML/MIN/1.73
GLOBULIN UR ELPH-MCNC: 3.5 GM/DL
GLUCOSE BLD-MCNC: 83 MG/DL (ref 74–98)
GLUCOSE UR STRIP-MCNC: NEGATIVE MG/DL
HBA1C MFR BLD: 5.7 % (ref 3–6)
HCT VFR BLD AUTO: 43.1 % (ref 37–47)
HGB BLD-MCNC: 13.9 G/DL (ref 12–16)
HGB UR QL STRIP.AUTO: NEGATIVE
HYALINE CASTS UR QL AUTO: ABNORMAL /LPF
IMM GRANULOCYTES # BLD: 0.03 10*3/MM3 (ref 0–0.06)
IMM GRANULOCYTES NFR BLD: 0.4 % (ref 0–0.6)
INR PPP: 1.12 (ref 0.9–1.1)
KETONES UR QL STRIP: NEGATIVE
LEUKOCYTE ESTERASE UR QL STRIP.AUTO: ABNORMAL
LYMPHOCYTES # BLD AUTO: 1.88 10*3/MM3 (ref 0.6–3.4)
LYMPHOCYTES NFR BLD AUTO: 24.2 % (ref 10–50)
MCH RBC QN AUTO: 30.3 PG (ref 27–31)
MCHC RBC AUTO-ENTMCNC: 32.3 G/DL (ref 30–37)
MCV RBC AUTO: 94.1 FL (ref 81–99)
MONOCYTES # BLD AUTO: 0.82 10*3/MM3 (ref 0–0.9)
MONOCYTES NFR BLD AUTO: 10.6 % (ref 0–12)
NEUTROPHILS # BLD AUTO: 4.45 10*3/MM3 (ref 2–6.9)
NEUTROPHILS NFR BLD AUTO: 57.4 % (ref 37–80)
NITRITE UR QL STRIP: NEGATIVE
NRBC BLD MANUAL-RTO: 0 /100 WBC (ref 0–0)
PH UR STRIP.AUTO: 5.5 [PH] (ref 5–8)
PLATELET # BLD AUTO: 258 10*3/MM3 (ref 130–400)
PMV BLD AUTO: 9.8 FL (ref 6–12)
POTASSIUM BLD-SCNC: 4.2 MMOL/L (ref 3.5–5.1)
PROT SERPL-MCNC: 7.6 G/DL (ref 6.3–8.2)
PROT UR QL STRIP: NEGATIVE
PROTHROMBIN TIME: 12.5 SECONDS (ref 9.3–12.1)
RBC # BLD AUTO: 4.58 10*6/MM3 (ref 4.2–5.4)
RBC # UR: ABNORMAL /HPF
REF LAB TEST METHOD: ABNORMAL
RH BLD: POSITIVE
SODIUM BLD-SCNC: 140 MMOL/L (ref 137–145)
SP GR UR STRIP: 1.02 (ref 1–1.03)
SQUAMOUS #/AREA URNS HPF: ABNORMAL /HPF
URATE SERPL-MCNC: 5.7 MG/DL (ref 2.5–8.5)
UROBILINOGEN UR QL STRIP: ABNORMAL
WBC NRBC COR # BLD: 7.76 10*3/MM3 (ref 4.8–10.8)
WBC UR QL AUTO: ABNORMAL /HPF

## 2018-07-09 PROCEDURE — 86140 C-REACTIVE PROTEIN: CPT | Performed by: ORTHOPAEDIC SURGERY

## 2018-07-09 PROCEDURE — 93005 ELECTROCARDIOGRAM TRACING: CPT | Performed by: ORTHOPAEDIC SURGERY

## 2018-07-09 PROCEDURE — 85651 RBC SED RATE NONAUTOMATED: CPT | Performed by: ORTHOPAEDIC SURGERY

## 2018-07-09 PROCEDURE — 87086 URINE CULTURE/COLONY COUNT: CPT | Performed by: ORTHOPAEDIC SURGERY

## 2018-07-09 PROCEDURE — 87077 CULTURE AEROBIC IDENTIFY: CPT | Performed by: ORTHOPAEDIC SURGERY

## 2018-07-09 PROCEDURE — 84550 ASSAY OF BLOOD/URIC ACID: CPT | Performed by: ORTHOPAEDIC SURGERY

## 2018-07-09 PROCEDURE — 85025 COMPLETE CBC W/AUTO DIFF WBC: CPT | Performed by: ORTHOPAEDIC SURGERY

## 2018-07-09 PROCEDURE — 86038 ANTINUCLEAR ANTIBODIES: CPT | Performed by: ORTHOPAEDIC SURGERY

## 2018-07-09 PROCEDURE — 36415 COLL VENOUS BLD VENIPUNCTURE: CPT

## 2018-07-09 PROCEDURE — 87186 SC STD MICRODIL/AGAR DIL: CPT | Performed by: ORTHOPAEDIC SURGERY

## 2018-07-09 PROCEDURE — 86225 DNA ANTIBODY NATIVE: CPT

## 2018-07-09 PROCEDURE — 85730 THROMBOPLASTIN TIME PARTIAL: CPT | Performed by: ORTHOPAEDIC SURGERY

## 2018-07-09 PROCEDURE — 86430 RHEUMATOID FACTOR TEST QUAL: CPT | Performed by: ORTHOPAEDIC SURGERY

## 2018-07-09 PROCEDURE — 80053 COMPREHEN METABOLIC PANEL: CPT | Performed by: ORTHOPAEDIC SURGERY

## 2018-07-09 PROCEDURE — 87081 CULTURE SCREEN ONLY: CPT | Performed by: ORTHOPAEDIC SURGERY

## 2018-07-09 PROCEDURE — 71046 X-RAY EXAM CHEST 2 VIEWS: CPT

## 2018-07-09 PROCEDURE — 86900 BLOOD TYPING SEROLOGIC ABO: CPT | Performed by: ORTHOPAEDIC SURGERY

## 2018-07-09 PROCEDURE — 81001 URINALYSIS AUTO W/SCOPE: CPT | Performed by: ORTHOPAEDIC SURGERY

## 2018-07-09 PROCEDURE — 86901 BLOOD TYPING SEROLOGIC RH(D): CPT | Performed by: ORTHOPAEDIC SURGERY

## 2018-07-09 PROCEDURE — 85610 PROTHROMBIN TIME: CPT | Performed by: ORTHOPAEDIC SURGERY

## 2018-07-09 PROCEDURE — 83036 HEMOGLOBIN GLYCOSYLATED A1C: CPT | Performed by: ORTHOPAEDIC SURGERY

## 2018-07-09 ASSESSMENT — KOOS JR
KOOS JR SCORE: 34.174
KOOS JR SCORE: 21

## 2018-07-10 DIAGNOSIS — A49.9 UTI (URINARY TRACT INFECTION), BACTERIAL: Primary | ICD-10-CM

## 2018-07-10 DIAGNOSIS — N39.0 UTI (URINARY TRACT INFECTION), BACTERIAL: Primary | ICD-10-CM

## 2018-07-10 LAB
ANA SER QL: POSITIVE
DSDNA AB SER-ACNC: 1 IU/ML (ref 0–9)
Lab: NORMAL
RHEUMATOID FACT SERPL-ACNC: NEGATIVE [IU]/ML

## 2018-07-10 RX ORDER — CEPHALEXIN 500 MG/1
500 CAPSULE ORAL 3 TIMES DAILY
Qty: 21 CAPSULE | Refills: 0 | Status: SHIPPED | OUTPATIENT
Start: 2018-07-10 | End: 2018-10-08

## 2018-07-11 ENCOUNTER — TELEPHONE (OUTPATIENT)
Dept: INTERNAL MEDICINE | Facility: CLINIC | Age: 83
End: 2018-07-11

## 2018-07-11 LAB — BACTERIA SPEC AEROBE CULT: ABNORMAL

## 2018-07-12 ENCOUNTER — OFFICE VISIT (OUTPATIENT)
Dept: ORTHOPEDIC SURGERY | Facility: CLINIC | Age: 83
End: 2018-07-12

## 2018-07-12 VITALS — HEIGHT: 66 IN | WEIGHT: 134.04 LBS | BODY MASS INDEX: 21.54 KG/M2 | RESPIRATION RATE: 16 BRPM

## 2018-07-12 DIAGNOSIS — M17.11 PRIMARY OSTEOARTHRITIS OF RIGHT KNEE: Primary | ICD-10-CM

## 2018-07-12 PROCEDURE — S0260 H&P FOR SURGERY: HCPCS | Performed by: PHYSICIAN ASSISTANT

## 2018-07-12 NOTE — PROGRESS NOTES
Fanta Rodney is a 82 y.o. female   Follow-up and Pre-op Exam of the Right Knee (PLANNED SURGICAL PROCEDURE: Elective right total knee replacement on 7-24-18.)       History of Present Illness      Patient is here for pre-op eval for right total knee replacement.,  Patient has tried conservative measures before considering surgery. ( injections, nsaids)    Patient would like to proceed with right total knee replacement.     Patient reports the pain interferes with daily activity.     PAST MEDICAL HISTORY:    Past Medical History:   Diagnosis Date   • Anxiety    • Arthritis    • Bilateral cataracts     HISTORY OF HAS HAD REMOVED   • Body piercing     EARS ONLY   • Choledocholithiasis    • Disease of thyroid gland    • GERD (gastroesophageal reflux disease)    • History of esophagogastroduodenoscopy (EGD) 09/06/2013   • History of toe fracture 10/2012    Left middle toe   • Murmur    • Nausea with vomiting    • Osteoarthritis    • Stress fracture of metatarsal bone 06/02/2013    Left Foot - Treated by Dr. Manley   • Tear of meniscus of knee    • Urinary tract infection    • Wears glasses    • Wears partial dentures     LOWER ONLY         Current Outpatient Prescriptions:   •  Ascorbic Acid (VITAMIN C) 500 MG capsule, Take  by mouth daily., Disp: , Rfl:   •  Calcium Carb-Cholecalciferol (CALCIUM + D3) 600-200 MG-UNIT tablet, Take  by mouth daily., Disp: , Rfl:   •  cetirizine (zyrTEC) 10 MG tablet, Take 10 mg by mouth Daily., Disp: , Rfl:   •  Cholecalciferol (VITAMIN D-3) 1000 UNITS capsule, Take  by mouth daily., Disp: , Rfl:   •  levothyroxine (SYNTHROID, LEVOTHROID) 100 MCG tablet, Take 88 mcg by mouth Daily., Disp: , Rfl:   •  MULTIPLE VITAMINS PO, Take  by mouth daily., Disp: , Rfl:   •  naproxen sodium (ALEVE) 220 MG tablet, Take 220 mg by mouth 2 (Two) Times a Day As Needed., Disp: , Rfl:   •  raNITIdine (ZANTAC) 150 MG tablet, TAKE 1 TABLET BY MOUTH  DAILY, Disp: 90 tablet, Rfl: 3  •  Turmeric 450 MG capsule,  Take 1,050 mg by mouth Daily., Disp: , Rfl:   •  cephalexin (KEFLEX) 500 MG capsule, Take 1 capsule by mouth 3 (Three) Times a Day., Disp: 21 capsule, Rfl: 0    Past Surgical History:   Procedure Laterality Date   • ADENOIDECTOMY     • CATARACT EXTRACTION Right 08/31/2016    Dr. Michele Damian   • CATARACT EXTRACTION Left 09/29/2016    Dr. Michele Damian    • CHOLECYSTECTOMY  09/27/2013    Dr. Alexis Lobo   • COLONOSCOPY  02/08/2013    Dr. Alexis Gary   • DILATION AND CURETTAGE, DIAGNOSTIC / THERAPEUTIC  1967   • ENDOSCOPY     • FINGER SURGERY Right 08/17/2006    2 fingers - joint replacement - [place]. Suh [place]   • THYROIDECTOMY  10/09/1967    Dr. Christiano Steward [place] - Diseased Thyroid (Goiter)   • TONSILLECTOMY         Family History   Problem Relation Age of Onset   • Cancer Mother    • Osteoarthritis Mother    • Cancer Father        Social History     Social History   • Marital status:      Spouse name: N/A   • Number of children: N/A   • Years of education: N/A     Occupational History   • Not on file.     Social History Main Topics   • Smoking status: Never Smoker   • Smokeless tobacco: Never Used   • Alcohol use Yes      Comment: OCCASIONALLY   • Drug use: No   • Sexual activity: Defer     Other Topics Concern   • Not on file     Social History Narrative   • No narrative on file        Allergies   Allergen Reactions   • Nexium [Esomeprazole Magnesium] GI Intolerance       Review of Systems   Constitutional: Negative for fever.   HENT: Negative for voice change.    Eyes: Negative for visual disturbance.   Respiratory: Negative for shortness of breath.    Cardiovascular: Negative for chest pain.   Gastrointestinal: Negative for abdominal distention and abdominal pain.   Genitourinary: Negative for dysuria.   Musculoskeletal: Positive for arthralgias. Negative for gait problem and joint swelling.   Skin: Negative for rash.   Neurological: Negative for speech difficulty.   Hematological: Does  "not bruise/bleed easily.   Psychiatric/Behavioral: Negative for confusion.       PHYSICAL EXAMINATION:       Resp 16   Ht 167.6 cm (65.98\")   Wt 60.8 kg (134 lb 0.6 oz)   BMI 21.65 kg/m²     GENERAL [x] Well developed  []Ill appearing [x] No acute distress    HEENT [x]No acute changes [x] Normocephalic, atraumatic    NECK [x]Supple  [] No midline tenderness    LUNGS [x]Clear bilaterally [x]No wheezes []Rhonchi [] Rales    HEART [x] Regular rate  [x] Regular rhythm [] Irregular    ABDOMEN [x] Soft [x] Not tender [x] Not distended [x] Normal sounds    VAS/EXT [x] Normal Pulses []Edema []Cyanosis             SKIN [x] Warm [x]Dry []Pink []Ecchymosis []Cool    NEURO [x] Sensation Intact [x] Motor Intact [x] Pulse intact  [] Dysesthesias:_________  []Weakness:__________             Physical Exam   Constitutional: She is oriented to person, place, and time. She appears well-nourished.   Eyes: Conjunctivae are normal.   Cardiovascular: Normal rate.    Pulmonary/Chest: No respiratory distress.   Abdominal: She exhibits no distension.   Musculoskeletal:        Right knee: She exhibits no swelling, no effusion, no ecchymosis and no erythema. Tenderness found. Medial joint line and lateral joint line tenderness noted.   Neurological: She is alert and oriented to person, place, and time.   Skin: Capillary refill takes less than 2 seconds.   Psychiatric: She has a normal mood and affect. Her behavior is normal.   Vitals reviewed.    Right Knee Exam     Range of Motion   Right knee extension: 8.   Flexion: 90     Other   Erythema: absent  Sensation: normal  Pulse: present  Other tests: no effusion present           Neurologic Exam     Mental Status   Oriented to person, place, and time.     Right Knee Exam     Tenderness   The patient is experiencing tenderness in the medial joint line, lateral joint line.                 Independent Review of Radiographic Studies:    No new imaging done today.  Reviewed with patient at a prior " visit.  Laboratory and Other Studies:  No new results reviewed today.         SPECIAL INSTRUCTIONS:  Multi-modal analgesia.  Multi-modal DVT prophylaxis.  Rehabilitation PT/OT planned.    Fanta was seen today for follow-up and pre-op exam.    Diagnoses and all orders for this visit:    Primary osteoarthritis of right knee          TXA screen performed in office:     Risks, benefits, and alternative treatments discussed with the patient: [x] Yes [] No    Risk benefits and merits of the proposed surgery were discussed and the patient's questions were answered risks discussed including and not limited to:  Anesthesia reactions  Blood loss and possible transfusion  Infection  Deep venous thrombosis and pulmonary embolus  Nerve, vascular or tendon injury  Fracture  Deformity  Stiffness  Weakness  Prosthesis and implant issues such as loosening, material wear or dislocation  Skin necrosis  Revision surgery or further treatment  Recurrence of problem and condition     Informed consent: [] Signed  [x] To be obtained at hospital  [] Both    Recommendations/Plan:    Orthopedic activities reviewed and patient expressed appreciation  Discussion of orthopedic goals  Risk, benefits, and merits of treatment alternatives reviewed with the patient and questions answered  The nature of the proposed surgery reviewed with the patient including risks, benefits, rehabilitation, recovery timeframe, and outcome expectations  After care and dental prophylaxis for joint replacement prosthesis    Exercise, medications, injections, other patient advice, and return appointment as noted.  Surgery: Surgery proposed at this visit as noted. and For intractable painful limiting condition, patient to consider elective surgical options.  Patient is encouraged to call or return for any issues or concerns.    PLANNED SURGICAL PROCEDURE: elective right total knee replacement

## 2018-07-13 LAB
HLA-B27 QL NAA+PROBE: NEGATIVE
MRSA SPEC QL CULT: NORMAL

## 2018-07-17 ENCOUNTER — TELEPHONE (OUTPATIENT)
Dept: SOCIAL WORK | Facility: HOSPITAL | Age: 83
End: 2018-07-17

## 2018-07-17 NOTE — TELEPHONE ENCOUNTER
CM called patient for pre surgery TKR.  Patient informed that  TKR was considered and overnight outpatient and she would be admitted as observation not inpatient.  Patient also informed that Medicare does not pay for inpatient rehab for this procedure.  Patient states she understood.    Patient lives alone.  Patient daughter and  Pt sister  Will provide transport and will assist her at home after surgery.      Patient currently has a wheeled walker, BSC, straight cane and  Quad cane, two steps two enter house with grab bars to assist.    Does not have home health a verbal list given for patient to choose from.    Sister will provide transport home.

## 2018-07-24 ENCOUNTER — ANESTHESIA (OUTPATIENT)
Dept: PERIOP | Facility: HOSPITAL | Age: 83
End: 2018-07-24

## 2018-07-24 ENCOUNTER — ANESTHESIA EVENT (OUTPATIENT)
Dept: PERIOP | Facility: HOSPITAL | Age: 83
End: 2018-07-24

## 2018-07-24 ENCOUNTER — APPOINTMENT (OUTPATIENT)
Dept: GENERAL RADIOLOGY | Facility: HOSPITAL | Age: 83
End: 2018-07-24
Attending: ORTHOPAEDIC SURGERY

## 2018-07-24 ENCOUNTER — HOSPITAL ENCOUNTER (OUTPATIENT)
Facility: HOSPITAL | Age: 83
Discharge: HOME HEALTH | End: 2018-07-27
Attending: ORTHOPAEDIC SURGERY | Admitting: ORTHOPAEDIC SURGERY

## 2018-07-24 DIAGNOSIS — Z78.9 IMPAIRED MOBILITY AND ADLS: Primary | ICD-10-CM

## 2018-07-24 DIAGNOSIS — M17.11 PRIMARY OSTEOARTHRITIS OF RIGHT KNEE: ICD-10-CM

## 2018-07-24 DIAGNOSIS — Z74.09 IMPAIRED FUNCTIONAL MOBILITY, BALANCE, GAIT, AND ENDURANCE: ICD-10-CM

## 2018-07-24 DIAGNOSIS — Z74.09 IMPAIRED MOBILITY AND ADLS: Primary | ICD-10-CM

## 2018-07-24 LAB
ABO GROUP BLD: NORMAL
BILIRUB UR QL STRIP: NEGATIVE
BLD GP AB SCN SERPL QL: NEGATIVE
CLARITY UR: CLEAR
COLOR UR: YELLOW
GLUCOSE UR STRIP-MCNC: NEGATIVE MG/DL
HGB UR QL STRIP.AUTO: NEGATIVE
KETONES UR QL STRIP: NEGATIVE
LEUKOCYTE ESTERASE UR QL STRIP.AUTO: NEGATIVE
NITRITE UR QL STRIP: NEGATIVE
PH UR STRIP.AUTO: 5.5 [PH] (ref 5–8)
PROT UR QL STRIP: NEGATIVE
RH BLD: POSITIVE
SP GR UR STRIP: 1.02 (ref 1–1.03)
T&S EXPIRATION DATE: NORMAL
UROBILINOGEN UR QL STRIP: NORMAL

## 2018-07-24 PROCEDURE — 25010000002 MIDAZOLAM PER 1 MG: Performed by: NURSE ANESTHETIST, CERTIFIED REGISTERED

## 2018-07-24 PROCEDURE — 94762 N-INVAS EAR/PLS OXIMTRY CONT: CPT

## 2018-07-24 PROCEDURE — 63710000001 HYDROCODONE-ACETAMINOPHEN 7.5-325 MG TABLET: Performed by: ORTHOPAEDIC SURGERY

## 2018-07-24 PROCEDURE — 63710000001 VITAMIN C 500 MG TABLET: Performed by: ORTHOPAEDIC SURGERY

## 2018-07-24 PROCEDURE — 25010000003 CEFAZOLIN PER 500 MG: Performed by: ORTHOPAEDIC SURGERY

## 2018-07-24 PROCEDURE — 25010000002 FENTANYL CITRATE (PF) 100 MCG/2ML SOLUTION: Performed by: NURSE ANESTHETIST, CERTIFIED REGISTERED

## 2018-07-24 PROCEDURE — 25010000002 ROPIVACAINE PER 1 MG: Performed by: NURSE ANESTHETIST, CERTIFIED REGISTERED

## 2018-07-24 PROCEDURE — 25010000002 DEXAMETHASONE PER 1 MG: Performed by: NURSE ANESTHETIST, CERTIFIED REGISTERED

## 2018-07-24 PROCEDURE — 86900 BLOOD TYPING SEROLOGIC ABO: CPT | Performed by: ORTHOPAEDIC SURGERY

## 2018-07-24 PROCEDURE — 25010000002 PROPOFOL 200 MG/20ML EMULSION: Performed by: NURSE ANESTHETIST, CERTIFIED REGISTERED

## 2018-07-24 PROCEDURE — 63710000001 FAMOTIDINE 20 MG TABLET: Performed by: ORTHOPAEDIC SURGERY

## 2018-07-24 PROCEDURE — 86901 BLOOD TYPING SEROLOGIC RH(D): CPT | Performed by: ORTHOPAEDIC SURGERY

## 2018-07-24 PROCEDURE — A9270 NON-COVERED ITEM OR SERVICE: HCPCS | Performed by: ORTHOPAEDIC SURGERY

## 2018-07-24 PROCEDURE — C1776 JOINT DEVICE (IMPLANTABLE): HCPCS | Performed by: ORTHOPAEDIC SURGERY

## 2018-07-24 PROCEDURE — G0378 HOSPITAL OBSERVATION PER HR: HCPCS

## 2018-07-24 PROCEDURE — 73560 X-RAY EXAM OF KNEE 1 OR 2: CPT

## 2018-07-24 PROCEDURE — 25010000002 PROPOFOL 1000 MG/100ML EMULSION: Performed by: NURSE ANESTHETIST, CERTIFIED REGISTERED

## 2018-07-24 PROCEDURE — 99218 PR INITIAL OBSERVATION CARE/DAY 30 MINUTES: CPT | Performed by: INTERNAL MEDICINE

## 2018-07-24 PROCEDURE — 63710000001 CYCLOBENZAPRINE 10 MG TABLET: Performed by: ORTHOPAEDIC SURGERY

## 2018-07-24 PROCEDURE — 25010000002 ONDANSETRON PER 1 MG: Performed by: ORTHOPAEDIC SURGERY

## 2018-07-24 PROCEDURE — 63710000001 CALCIUM-VITAMIN D 500-200 MG-UNIT TABLET: Performed by: ORTHOPAEDIC SURGERY

## 2018-07-24 PROCEDURE — 27447 TOTAL KNEE ARTHROPLASTY: CPT | Performed by: ORTHOPAEDIC SURGERY

## 2018-07-24 PROCEDURE — 25010000002 ONDANSETRON PER 1 MG: Performed by: NURSE ANESTHETIST, CERTIFIED REGISTERED

## 2018-07-24 PROCEDURE — 81003 URINALYSIS AUTO W/O SCOPE: CPT | Performed by: ORTHOPAEDIC SURGERY

## 2018-07-24 PROCEDURE — C1713 ANCHOR/SCREW BN/BN,TIS/BN: HCPCS | Performed by: ORTHOPAEDIC SURGERY

## 2018-07-24 PROCEDURE — 86850 RBC ANTIBODY SCREEN: CPT | Performed by: ORTHOPAEDIC SURGERY

## 2018-07-24 PROCEDURE — 25010000002 MORPHINE PER 10 MG: Performed by: ORTHOPAEDIC SURGERY

## 2018-07-24 DEVICE — TRY TIB CRUC 71MM: Type: IMPLANTABLE DEVICE | Site: KNEE | Status: FUNCTIONAL

## 2018-07-24 DEVICE — PAT 3PEG STD 8.5X34MM: Type: IMPLANTABLE DEVICE | Site: KNEE | Status: FUNCTIONAL

## 2018-07-24 DEVICE — BEAR TIB/KN VANGUARD CR DCM 12X71/75MM NS: Type: IMPLANTABLE DEVICE | Site: KNEE | Status: FUNCTIONAL

## 2018-07-24 DEVICE — CAP TOTL KN CMT PREMIUM: Type: IMPLANTABLE DEVICE | Site: KNEE | Status: FUNCTIONAL

## 2018-07-24 DEVICE — COMP FEM/KN VANGUARD INTLK CR 65MM NS RT: Type: IMPLANTABLE DEVICE | Site: KNEE | Status: FUNCTIONAL

## 2018-07-24 DEVICE — CMT BONE SIMPLEX RO FUL DOSE: Type: IMPLANTABLE DEVICE | Site: KNEE | Status: FUNCTIONAL

## 2018-07-24 RX ORDER — SODIUM CHLORIDE 0.9 % (FLUSH) 0.9 %
3 SYRINGE (ML) INJECTION AS NEEDED
Status: DISCONTINUED | OUTPATIENT
Start: 2018-07-24 | End: 2018-07-24 | Stop reason: HOSPADM

## 2018-07-24 RX ORDER — ONDANSETRON 2 MG/ML
4 INJECTION INTRAMUSCULAR; INTRAVENOUS ONCE AS NEEDED
Status: DISCONTINUED | OUTPATIENT
Start: 2018-07-24 | End: 2018-07-24 | Stop reason: HOSPADM

## 2018-07-24 RX ORDER — ALBUTEROL SULFATE 2.5 MG/3ML
2.5 SOLUTION RESPIRATORY (INHALATION) ONCE AS NEEDED
Status: DISCONTINUED | OUTPATIENT
Start: 2018-07-24 | End: 2018-07-24 | Stop reason: HOSPADM

## 2018-07-24 RX ORDER — ROPIVACAINE HYDROCHLORIDE 5 MG/ML
INJECTION, SOLUTION EPIDURAL; INFILTRATION; PERINEURAL AS NEEDED
Status: DISCONTINUED | OUTPATIENT
Start: 2018-07-24 | End: 2018-07-24 | Stop reason: SURG

## 2018-07-24 RX ORDER — DOCUSATE SODIUM 100 MG/1
100 CAPSULE, LIQUID FILLED ORAL 2 TIMES DAILY PRN
Status: DISCONTINUED | OUTPATIENT
Start: 2018-07-24 | End: 2018-07-27 | Stop reason: HOSPADM

## 2018-07-24 RX ORDER — PROPOFOL 10 MG/ML
INJECTION, EMULSION INTRAVENOUS AS NEEDED
Status: DISCONTINUED | OUTPATIENT
Start: 2018-07-24 | End: 2018-07-24 | Stop reason: SURG

## 2018-07-24 RX ORDER — LEVOTHYROXINE SODIUM 88 UG/1
88 TABLET ORAL
Status: DISCONTINUED | OUTPATIENT
Start: 2018-07-25 | End: 2018-07-27 | Stop reason: HOSPADM

## 2018-07-24 RX ORDER — BUPIVACAINE HCL/0.9 % NACL/PF 0.125 %
6 PREFILLED PUMP RESERVOIR EPIDURAL CONTINUOUS
Status: DISCONTINUED | OUTPATIENT
Start: 2018-07-24 | End: 2018-07-27 | Stop reason: HOSPADM

## 2018-07-24 RX ORDER — CETIRIZINE HYDROCHLORIDE 10 MG/1
5 TABLET ORAL DAILY
Status: DISCONTINUED | OUTPATIENT
Start: 2018-07-24 | End: 2018-07-27 | Stop reason: HOSPADM

## 2018-07-24 RX ORDER — SODIUM CHLORIDE 0.9 % (FLUSH) 0.9 %
1-10 SYRINGE (ML) INJECTION AS NEEDED
Status: DISCONTINUED | OUTPATIENT
Start: 2018-07-24 | End: 2018-07-27 | Stop reason: HOSPADM

## 2018-07-24 RX ORDER — MIDAZOLAM HYDROCHLORIDE 1 MG/ML
INJECTION INTRAMUSCULAR; INTRAVENOUS AS NEEDED
Status: DISCONTINUED | OUTPATIENT
Start: 2018-07-24 | End: 2018-07-24 | Stop reason: SURG

## 2018-07-24 RX ORDER — PROPOFOL 10 MG/ML
INJECTION, EMULSION INTRAVENOUS CONTINUOUS PRN
Status: DISCONTINUED | OUTPATIENT
Start: 2018-07-24 | End: 2018-07-24 | Stop reason: SURG

## 2018-07-24 RX ORDER — SODIUM CHLORIDE, SODIUM LACTATE, POTASSIUM CHLORIDE, CALCIUM CHLORIDE 600; 310; 30; 20 MG/100ML; MG/100ML; MG/100ML; MG/100ML
100 INJECTION, SOLUTION INTRAVENOUS CONTINUOUS
Status: DISCONTINUED | OUTPATIENT
Start: 2018-07-24 | End: 2018-07-27 | Stop reason: HOSPADM

## 2018-07-24 RX ORDER — FENTANYL CITRATE 50 UG/ML
INJECTION, SOLUTION INTRAMUSCULAR; INTRAVENOUS AS NEEDED
Status: DISCONTINUED | OUTPATIENT
Start: 2018-07-24 | End: 2018-07-24 | Stop reason: SURG

## 2018-07-24 RX ORDER — ONDANSETRON 4 MG/1
4 TABLET, ORALLY DISINTEGRATING ORAL EVERY 6 HOURS PRN
Status: DISCONTINUED | OUTPATIENT
Start: 2018-07-24 | End: 2018-07-27 | Stop reason: HOSPADM

## 2018-07-24 RX ORDER — PROMETHAZINE HYDROCHLORIDE 25 MG/ML
6.25 INJECTION, SOLUTION INTRAMUSCULAR; INTRAVENOUS ONCE AS NEEDED
Status: DISCONTINUED | OUTPATIENT
Start: 2018-07-24 | End: 2018-07-24 | Stop reason: HOSPADM

## 2018-07-24 RX ORDER — BUPIVACAINE HYDROCHLORIDE 5 MG/ML
INJECTION, SOLUTION EPIDURAL; INTRACAUDAL AS NEEDED
Status: DISCONTINUED | OUTPATIENT
Start: 2018-07-24 | End: 2018-07-24 | Stop reason: SURG

## 2018-07-24 RX ORDER — DEXAMETHASONE SODIUM PHOSPHATE 4 MG/ML
INJECTION, SOLUTION INTRA-ARTICULAR; INTRALESIONAL; INTRAMUSCULAR; INTRAVENOUS; SOFT TISSUE AS NEEDED
Status: DISCONTINUED | OUTPATIENT
Start: 2018-07-24 | End: 2018-07-24 | Stop reason: SURG

## 2018-07-24 RX ORDER — LORAZEPAM 2 MG/ML
1 INJECTION INTRAMUSCULAR EVERY 6 HOURS PRN
Status: DISCONTINUED | OUTPATIENT
Start: 2018-07-24 | End: 2018-07-27 | Stop reason: HOSPADM

## 2018-07-24 RX ORDER — MORPHINE SULFATE 4 MG/ML
4 INJECTION, SOLUTION INTRAMUSCULAR; INTRAVENOUS
Status: DISCONTINUED | OUTPATIENT
Start: 2018-07-24 | End: 2018-07-27 | Stop reason: HOSPADM

## 2018-07-24 RX ORDER — MEPERIDINE HYDROCHLORIDE 50 MG/ML
12.5 INJECTION INTRAMUSCULAR; INTRAVENOUS; SUBCUTANEOUS
Status: DISCONTINUED | OUTPATIENT
Start: 2018-07-24 | End: 2018-07-24 | Stop reason: HOSPADM

## 2018-07-24 RX ORDER — ONDANSETRON 4 MG/1
4 TABLET, FILM COATED ORAL EVERY 6 HOURS PRN
Status: DISCONTINUED | OUTPATIENT
Start: 2018-07-24 | End: 2018-07-27 | Stop reason: HOSPADM

## 2018-07-24 RX ORDER — CYCLOBENZAPRINE HCL 10 MG
10 TABLET ORAL 2 TIMES DAILY PRN
Status: DISCONTINUED | OUTPATIENT
Start: 2018-07-24 | End: 2018-07-27 | Stop reason: HOSPADM

## 2018-07-24 RX ORDER — HYDROCODONE BITARTRATE AND ACETAMINOPHEN 7.5; 325 MG/1; MG/1
1 TABLET ORAL EVERY 4 HOURS PRN
Status: DISCONTINUED | OUTPATIENT
Start: 2018-07-24 | End: 2018-07-27 | Stop reason: HOSPADM

## 2018-07-24 RX ORDER — PROMETHAZINE HYDROCHLORIDE 25 MG/1
25 TABLET ORAL ONCE AS NEEDED
Status: DISCONTINUED | OUTPATIENT
Start: 2018-07-24 | End: 2018-07-24 | Stop reason: HOSPADM

## 2018-07-24 RX ORDER — ONDANSETRON 2 MG/ML
INJECTION INTRAMUSCULAR; INTRAVENOUS AS NEEDED
Status: DISCONTINUED | OUTPATIENT
Start: 2018-07-24 | End: 2018-07-24 | Stop reason: SURG

## 2018-07-24 RX ORDER — FONDAPARINUX SODIUM 2.5 MG/.5ML
2.5 INJECTION SUBCUTANEOUS
Status: DISCONTINUED | OUTPATIENT
Start: 2018-07-25 | End: 2018-07-27 | Stop reason: HOSPADM

## 2018-07-24 RX ORDER — SODIUM CHLORIDE, SODIUM LACTATE, POTASSIUM CHLORIDE, CALCIUM CHLORIDE 600; 310; 30; 20 MG/100ML; MG/100ML; MG/100ML; MG/100ML
1000 INJECTION, SOLUTION INTRAVENOUS CONTINUOUS
Status: DISCONTINUED | OUTPATIENT
Start: 2018-07-24 | End: 2018-07-24

## 2018-07-24 RX ORDER — FAMOTIDINE 20 MG/1
20 TABLET, FILM COATED ORAL DAILY
Status: DISCONTINUED | OUTPATIENT
Start: 2018-07-24 | End: 2018-07-27 | Stop reason: HOSPADM

## 2018-07-24 RX ORDER — BISACODYL 10 MG
10 SUPPOSITORY, RECTAL RECTAL DAILY PRN
Status: DISCONTINUED | OUTPATIENT
Start: 2018-07-24 | End: 2018-07-27 | Stop reason: HOSPADM

## 2018-07-24 RX ORDER — PROMETHAZINE HYDROCHLORIDE 25 MG/1
25 SUPPOSITORY RECTAL ONCE AS NEEDED
Status: DISCONTINUED | OUTPATIENT
Start: 2018-07-24 | End: 2018-07-24 | Stop reason: HOSPADM

## 2018-07-24 RX ORDER — BUPIVACAINE HCL/0.9 % NACL/PF 0.125 %
PLASTIC BAG, INJECTION (ML) EPIDURAL AS NEEDED
Status: DISCONTINUED | OUTPATIENT
Start: 2018-07-24 | End: 2018-07-24 | Stop reason: SURG

## 2018-07-24 RX ORDER — NALOXONE HCL 0.4 MG/ML
0.4 VIAL (ML) INJECTION
Status: DISCONTINUED | OUTPATIENT
Start: 2018-07-24 | End: 2018-07-27 | Stop reason: HOSPADM

## 2018-07-24 RX ORDER — HYDROCODONE BITARTRATE AND ACETAMINOPHEN 7.5; 325 MG/1; MG/1
1 TABLET ORAL EVERY 6 HOURS PRN
Status: DISCONTINUED | OUTPATIENT
Start: 2018-07-24 | End: 2018-07-24

## 2018-07-24 RX ORDER — ONDANSETRON 2 MG/ML
4 INJECTION INTRAMUSCULAR; INTRAVENOUS EVERY 6 HOURS PRN
Status: DISCONTINUED | OUTPATIENT
Start: 2018-07-24 | End: 2018-07-27 | Stop reason: HOSPADM

## 2018-07-24 RX ORDER — ASCORBIC ACID 500 MG
500 TABLET ORAL DAILY
Status: DISCONTINUED | OUTPATIENT
Start: 2018-07-24 | End: 2018-07-27 | Stop reason: HOSPADM

## 2018-07-24 RX ORDER — FENTANYL CITRATE 50 UG/ML
INJECTION, SOLUTION INTRAMUSCULAR; INTRAVENOUS
Status: COMPLETED
Start: 2018-07-24 | End: 2018-07-24

## 2018-07-24 RX ADMIN — SODIUM CHLORIDE, POTASSIUM CHLORIDE, SODIUM LACTATE AND CALCIUM CHLORIDE 1000 ML: 600; 310; 30; 20 INJECTION, SOLUTION INTRAVENOUS at 09:43

## 2018-07-24 RX ADMIN — CEFAZOLIN 1 G: 1 INJECTION, POWDER, FOR SOLUTION INTRAMUSCULAR; INTRAVENOUS; PARENTERAL at 18:35

## 2018-07-24 RX ADMIN — Medication 150 MCG: at 13:02

## 2018-07-24 RX ADMIN — Medication 150 MCG: at 12:05

## 2018-07-24 RX ADMIN — BUPIVACAINE HYDROCHLORIDE 1.8 ML: 5 INJECTION, SOLUTION EPIDURAL; INTRACAUDAL; PERINEURAL at 11:15

## 2018-07-24 RX ADMIN — Medication 6 ML/HR: at 13:43

## 2018-07-24 RX ADMIN — EPHEDRINE SULFATE 10 MG: 50 INJECTION INTRAMUSCULAR; INTRAVENOUS; SUBCUTANEOUS at 12:50

## 2018-07-24 RX ADMIN — PROPOFOL 160 MCG/KG/MIN: 10 INJECTION, EMULSION INTRAVENOUS at 11:20

## 2018-07-24 RX ADMIN — ONDANSETRON 4 MG: 2 INJECTION, SOLUTION INTRAMUSCULAR; INTRAVENOUS at 18:39

## 2018-07-24 RX ADMIN — TRANEXAMIC ACID 1000 MG: 100 INJECTION, SOLUTION INTRAVENOUS at 11:20

## 2018-07-24 RX ADMIN — EPHEDRINE SULFATE 15 MG: 50 INJECTION INTRAMUSCULAR; INTRAVENOUS; SUBCUTANEOUS at 11:49

## 2018-07-24 RX ADMIN — SODIUM CHLORIDE, POTASSIUM CHLORIDE, SODIUM LACTATE AND CALCIUM CHLORIDE: 600; 310; 30; 20 INJECTION, SOLUTION INTRAVENOUS at 12:15

## 2018-07-24 RX ADMIN — PROPOFOL 40 MG: 10 INJECTION, EMULSION INTRAVENOUS at 11:20

## 2018-07-24 RX ADMIN — HYDROCODONE BITARTRATE AND ACETAMINOPHEN 1 TABLET: 7.5; 325 TABLET ORAL at 17:26

## 2018-07-24 RX ADMIN — FAMOTIDINE 20 MG: 20 TABLET ORAL at 16:32

## 2018-07-24 RX ADMIN — EPHEDRINE SULFATE 15 MG: 50 INJECTION INTRAMUSCULAR; INTRAVENOUS; SUBCUTANEOUS at 13:02

## 2018-07-24 RX ADMIN — ROPIVACAINE HYDROCHLORIDE 10 ML: 5 INJECTION, SOLUTION EPIDURAL; INFILTRATION; PERINEURAL at 10:27

## 2018-07-24 RX ADMIN — Medication 150 MCG: at 11:19

## 2018-07-24 RX ADMIN — MORPHINE SULFATE 4 MG: 4 INJECTION, SOLUTION INTRAMUSCULAR; INTRAVENOUS at 18:39

## 2018-07-24 RX ADMIN — DEXAMETHASONE SODIUM PHOSPHATE 4 MG: 4 INJECTION, SOLUTION INTRAMUSCULAR; INTRAVENOUS at 12:13

## 2018-07-24 RX ADMIN — ROPIVACAINE HYDROCHLORIDE 10 ML: 5 INJECTION, SOLUTION EPIDURAL; INFILTRATION; PERINEURAL at 10:43

## 2018-07-24 RX ADMIN — MIDAZOLAM HYDROCHLORIDE 1 MG: 1 INJECTION, SOLUTION INTRAMUSCULAR; INTRAVENOUS at 12:15

## 2018-07-24 RX ADMIN — MORPHINE SULFATE 4 MG: 4 INJECTION, SOLUTION INTRAMUSCULAR; INTRAVENOUS at 20:46

## 2018-07-24 RX ADMIN — FAMOTIDINE 20 MG: 10 INJECTION INTRAVENOUS at 09:46

## 2018-07-24 RX ADMIN — TRANEXAMIC ACID 1000 MG: 100 INJECTION, SOLUTION INTRAVENOUS at 12:33

## 2018-07-24 RX ADMIN — CYCLOBENZAPRINE HYDROCHLORIDE 10 MG: 10 TABLET, FILM COATED ORAL at 20:33

## 2018-07-24 RX ADMIN — OXYCODONE HYDROCHLORIDE AND ACETAMINOPHEN 500 MG: 500 TABLET ORAL at 16:32

## 2018-07-24 RX ADMIN — Medication 150 MCG: at 11:53

## 2018-07-24 RX ADMIN — CEFAZOLIN SODIUM 2 G: 2 SOLUTION INTRAVENOUS at 11:06

## 2018-07-24 RX ADMIN — FENTANYL CITRATE 25 MCG: 50 INJECTION, SOLUTION INTRAMUSCULAR; INTRAVENOUS at 11:15

## 2018-07-24 RX ADMIN — FENTANYL CITRATE 25 MCG: 50 INJECTION, SOLUTION INTRAMUSCULAR; INTRAVENOUS at 10:22

## 2018-07-24 RX ADMIN — Medication 150 MCG: at 12:26

## 2018-07-24 RX ADMIN — SODIUM CHLORIDE, POTASSIUM CHLORIDE, SODIUM LACTATE AND CALCIUM CHLORIDE: 600; 310; 30; 20 INJECTION, SOLUTION INTRAVENOUS at 13:00

## 2018-07-24 RX ADMIN — PROPOFOL 80 MG: 10 INJECTION, EMULSION INTRAVENOUS at 12:15

## 2018-07-24 RX ADMIN — MIDAZOLAM HYDROCHLORIDE 1 MG: 1 INJECTION, SOLUTION INTRAMUSCULAR; INTRAVENOUS at 11:16

## 2018-07-24 RX ADMIN — FENTANYL CITRATE 25 MCG: 50 INJECTION, SOLUTION INTRAMUSCULAR; INTRAVENOUS at 13:08

## 2018-07-24 RX ADMIN — OYSTER SHELL CALCIUM WITH VITAMIN D 1 TABLET: 500; 200 TABLET, FILM COATED ORAL at 16:32

## 2018-07-24 RX ADMIN — ROPIVACAINE HYDROCHLORIDE 10 ML: 5 INJECTION, SOLUTION EPIDURAL; INFILTRATION; PERINEURAL at 10:47

## 2018-07-24 RX ADMIN — ONDANSETRON 4 MG: 2 INJECTION INTRAMUSCULAR; INTRAVENOUS at 12:15

## 2018-07-24 RX ADMIN — EPHEDRINE SULFATE 10 MG: 50 INJECTION INTRAMUSCULAR; INTRAVENOUS; SUBCUTANEOUS at 11:33

## 2018-07-24 NOTE — ANESTHESIA PREPROCEDURE EVALUATION
Anesthesia Evaluation     Patient summary reviewed and Nursing notes reviewed   NPO Solid Status: > 8 hours  NPO Liquid Status: > 8 hours           Airway   Mallampati: II  TM distance: >3 FB  Neck ROM: full  No difficulty expected  Dental - normal exam     Pulmonary - negative pulmonary ROS and normal exam   Cardiovascular - normal exam  Exercise tolerance: good (4-7 METS)    ECG reviewed    (+) valvular problems/murmurs murmur,     ROS comment: NSR rate 63 (7/2018)    Neuro/Psych  (+) psychiatric history,     GI/Hepatic/Renal/Endo    (+)  GERD well controlled,  hypothyroidism,     Musculoskeletal     Abdominal  - normal exam   Substance History - negative use     OB/GYN negative ob/gyn ROS         Other   (+) arthritis                     Anesthesia Plan    ASA 2     MAC, spinal and regional   (Risks of spinal anesthesia discussed with patient, including, but not limited to: headache, itching, nausea/vomiting, infection, failure of block, fluctuations in blood pressure, acute or chronic back pain, nerve damage, paralysis, etc.     Pt advised that spinal anesthesia may not relieve all of the discomfort. General anesthesia will be performed if spinal is ineffective.     Preop PNB's will be performed, including adducor canal block with ON-Q pain ball, I-PACK and DOROTHY anterior thigh.    All questions answered, informed consent obtained and time out procedure performed.   )  Anesthetic plan and risks discussed with patient.

## 2018-07-24 NOTE — ANESTHESIA PROCEDURE NOTES
Spinal Block    Patient location during procedure: OR  Start Time: 7/24/2018 11:13 AM  Stop Time: 7/24/2018 11:15 AM  Indication:at surgeon's request and procedure for pain  Performed By  CRNA: RADHA SNIDER  Preanesthetic Checklist  Completed: patient identified, site marked, surgical consent, pre-op evaluation, timeout performed, IV checked, risks and benefits discussed and monitors and equipment checked  Spinal Block Prep:  Patient Position:sitting  Sterile Tech:cap, gloves, mask and sterile barriers  Prep:Chloraprep and 3 min dry time  Patient Monitoring:blood pressure monitoring, continuous pulse oximetry and EKG  Spinal Block Procedure  Approach:midline  Guidance:landmark technique  Location:L4-L5  Needle Type:Pencan  Needle Gauge:25 G  Placement of Spinal needle event:cerebrospinal fluid aspirated  Paresthesia: no  Fluid Appearance:clear  Post Assessment  Patient Tolerance:patient tolerated the procedure well with no apparent complications  Complications yes  Additional Notes  Risks of spinal  discussed, including but not limited to headache, backache, nausea and vomiting, infection, failure of block, decreased BP, permanent chronic back pain, nerve damage, paralysis, etc.     Skin localized with lidocaine skin wheal    SPINAL INJECTED INTRATHECALLY WITH:  1.8 ml 0.5% Bupivacaine PF

## 2018-07-24 NOTE — ANESTHESIA PROCEDURE NOTES
Peripheral Block    Patient location during procedure: pre-op  Start time: 7/24/2018 10:25 AM  Stop time: 7/24/2018 10:33 AM  Reason for block: at surgeon's request and post-op pain management  Performed by  CRNA: RADHA SNIDER  Preanesthetic Checklist  Completed: patient identified, site marked, surgical consent, pre-op evaluation, timeout performed, IV checked, risks and benefits discussed and monitors and equipment checked  Prep:  Pt Position: supine  Sterile barriers:cap, gloves, mask and sterile barriers  Prep: ChloraPrep and 3 min dry time  Patient monitoring: blood pressure monitoring, continuous pulse oximetry and EKG  Procedure  Sedation:yes  Performed under: local infiltration  Guidance:ultrasound guided  ULTRASOUND INTERPRETATION. Using ultrasound guidance a gauge needle was placed in close proximity to the femoral nerve, at which point, under ultrasound guidance anesthetic was injected in the area of the nerve and spread of the anesthesia was seen on ultrasound in close proximity thereto.  There were no abnormalities seen on ultrasound; a digital image was taken; and the patient tolerated the procedure with no complications. Images:still images obtained    Laterality:right  Block Type:adductor canal block  Injection Technique:catheter  Needle Type:echogenic  Needle Gauge:18 G  Resistance on Injection: none  Catheter Size:20 G  Cath Depth at skin: 10 cm  Medications  Comment:Adjuncts per total volume of LA: 10        If required, intravenous sedation was given -- see meds on anesthesia record.  Local Injected:ropivacaine 0.5% Local Amount Injected:10mL  Post Assessment  Injection Assessment: negative aspiration for heme, no paresthesia on injection and incremental injection  Patient Tolerance:comfortable throughout block  Complications:no  Additional Notes  Procedure:          CATHETER ADDUCTOR CANAL BLOCK                                                             CATHETER at skin: 10                                 Patient analgesia was achieved with Skin infiltration 2ml Lidocaine or Bupivacaine       The pt was placed in the Supine position.  The Insertion site was  prepped and Draped in sterile fashion.  A  I-Flow 18g echogenic needle was then  inserted approximately midline, mid-thigh and advanced In-plane with Ultrasound guidance.  Normal Saline PSF was utilized for hydrodissection of tissue.  The Vastus medialis and Sartorius muscle where visualized and the needle tip was placed in the adductor canal,  lateral to the femoral artery.  LA injection spread was visualized, injection was incremental 1-5 ml, injection pressure was normal or little; no intraneural injection; no vascular injection.  LA dose was injected thru the needle (see dose above).  I-flow 20g catheter was placed via the needle with ultrasound visualization and confirmation with NS fluid bolus or air injection. The needle was then removed and the skin was sealed with Skin Affiix at catheter insertion site.  Skin was prepped with benzoin or mastisol and the labeled curled catheter was secured with steristrips and a transparent dressing.

## 2018-07-24 NOTE — ANESTHESIA PROCEDURE NOTES
Peripheral Block    Patient location during procedure: pre-op  Start time: 7/24/2018 10:46 AM  Stop time: 7/24/2018 10:48 AM  Reason for block: procedure for pain, at surgeon's request and post-op pain management  Performed by  CRNA: RADHA SNIDER  Preanesthetic Checklist  Completed: patient identified, site marked, surgical consent, pre-op evaluation, timeout performed, IV checked, risks and benefits discussed and monitors and equipment checked  Prep:  Pt Position: supine  Sterile barriers:cap, gloves, sterile barriers and mask  Prep: ChloraPrep  Patient monitoring: blood pressure monitoring, continuous pulse oximetry and EKG  Procedure  Sedation:yes  Performed under: local infiltration  Guidance:ultrasound guided  Images:still images not obtained    Laterality:right  Block Type:field  Injection Technique:single-shot  Needle Type:echogenic  Needle Gauge:18 G  Resistance on Injection: none  Medications  Preservative Free Saline:10ml  Comment:10 ml of 0.5% ropivacaine with 10 ml of PF NS.   Local Injected:ropivacaine 0.5% Local Amount Injected:10mL  Post Assessment  Injection Assessment: negative aspiration for heme, no paresthesia on injection and incremental injection  Patient Tolerance:comfortable throughout block  Complications:no  Additional Notes  Local infiltration of anesthesia across anterior thigh. Ultrasound guided.

## 2018-07-24 NOTE — ANESTHESIA PROCEDURE NOTES
Peripheral Block    Patient location during procedure: pre-op  Start time: 7/24/2018 10:43 AM  Stop time: 7/24/2018 10:44 AM  Reason for block: at surgeon's request and post-op pain management  Performed by  CRNA: RADHA SNIDER  Preanesthetic Checklist  Completed: patient identified, site marked, surgical consent, pre-op evaluation, timeout performed, IV checked, risks and benefits discussed and monitors and equipment checked  Prep:  Pt Position: supine  Sterile barriers:cap, gloves, sterile barriers and mask  Prep: ChloraPrep  Patient monitoring: blood pressure monitoring, continuous pulse oximetry and EKG  Procedure  Sedation:yes  Performed under: local infiltration  Guidance:ultrasound guided  Images:still images obtained    Laterality:right  Block Type:popliteal  Injection Technique:single-shot  Needle Type:echogenic  Needle Gauge:18 G  Resistance on Injection: none  Medications  Preservative Free Saline:10ml  Comment:Total 20 ml of 0.25% ropivicaine injected (I-PACK) block. 10 ml of 0.5% ropivacaine with 10 ml of PF NS.   Local Injected:ropivacaine 0.5% Local Amount Injected:10mL  Post Assessment  Injection Assessment: negative aspiration for heme, no paresthesia on injection and incremental injection  Patient Tolerance:comfortable throughout block  Complications:no  Additional Notes  Right I-PACK block performed under ultrasound guidance.

## 2018-07-24 NOTE — ANESTHESIA POSTPROCEDURE EVALUATION
Patient: Fanta Rodney    Procedure Summary     Date:  07/24/18 Room / Location:  [facility] OR  /  [facility]    Anesthesia Start:  1106 Anesthesia Stop:  1328    Procedure:  Elective right total knee replacement (BIOMET) (Right Knee) Diagnosis:       Primary osteoarthritis of right knee      (Primary osteoarthritis of right knee [M17.11])    Surgeon:  Oscar Snow MD Provider:  Alex Denney CRNA    Anesthesia Type:  MAC, spinal, regional ASA Status:  2          nesthesia Type: MAC, spinal, regional  Last vitals  BP   97/43 @1330   Temp 97.2     Pulse   69   Resp   10   SpO2   98%     Post Anesthesia Care and Evaluation    Patient location during evaluation: PACU  Patient participation: complete - patient participated  Level of consciousness: awake and sleepy but conscious  Pain score: 0  Pain management: adequate  Airway patency: patent  Anesthetic complications: No anesthetic complications  PONV Status: none  Cardiovascular status: acceptable  Respiratory status: acceptable  Hydration status: acceptable

## 2018-07-25 LAB
ANION GAP SERPL CALCULATED.3IONS-SCNC: 8.3 MMOL/L (ref 10–20)
BASOPHILS # BLD AUTO: 0.04 10*3/MM3 (ref 0–0.2)
BASOPHILS NFR BLD AUTO: 0.4 % (ref 0–2.5)
BUN BLD-MCNC: 15 MG/DL (ref 7–20)
BUN/CREAT SERPL: 18.8 (ref 7.1–23.5)
CALCIUM SPEC-SCNC: 9.1 MG/DL (ref 8.4–10.2)
CHLORIDE SERPL-SCNC: 103 MMOL/L (ref 98–107)
CO2 SERPL-SCNC: 30 MMOL/L (ref 26–30)
CREAT BLD-MCNC: 0.8 MG/DL (ref 0.6–1.3)
DEPRECATED RDW RBC AUTO: 48.1 FL (ref 37–54)
EOSINOPHIL # BLD AUTO: 0.01 10*3/MM3 (ref 0–0.7)
EOSINOPHIL NFR BLD AUTO: 0.1 % (ref 0–7)
ERYTHROCYTE [DISTWIDTH] IN BLOOD BY AUTOMATED COUNT: 14.4 % (ref 11.5–14.5)
GFR SERPL CREATININE-BSD FRML MDRD: 69 ML/MIN/1.73
GLUCOSE BLD-MCNC: 106 MG/DL (ref 74–98)
HCT VFR BLD AUTO: 35.6 % (ref 37–47)
HGB BLD-MCNC: 11.6 G/DL (ref 12–16)
IMM GRANULOCYTES # BLD: 0.04 10*3/MM3 (ref 0–0.06)
IMM GRANULOCYTES NFR BLD: 0.4 % (ref 0–0.6)
LYMPHOCYTES # BLD AUTO: 1.23 10*3/MM3 (ref 0.6–3.4)
LYMPHOCYTES NFR BLD AUTO: 11.3 % (ref 10–50)
MCH RBC QN AUTO: 29.8 PG (ref 27–31)
MCHC RBC AUTO-ENTMCNC: 32.6 G/DL (ref 30–37)
MCV RBC AUTO: 91.5 FL (ref 81–99)
MONOCYTES # BLD AUTO: 1.36 10*3/MM3 (ref 0–0.9)
MONOCYTES NFR BLD AUTO: 12.4 % (ref 0–12)
NEUTROPHILS # BLD AUTO: 8.25 10*3/MM3 (ref 2–6.9)
NEUTROPHILS NFR BLD AUTO: 75.4 % (ref 37–80)
NRBC BLD MANUAL-RTO: 0 /100 WBC (ref 0–0)
PLATELET # BLD AUTO: 228 10*3/MM3 (ref 130–400)
PMV BLD AUTO: 9.3 FL (ref 6–12)
POTASSIUM BLD-SCNC: 4.3 MMOL/L (ref 3.5–5.1)
RBC # BLD AUTO: 3.89 10*6/MM3 (ref 4.2–5.4)
SODIUM BLD-SCNC: 137 MMOL/L (ref 137–145)
TSH SERPL DL<=0.05 MIU/L-ACNC: 2.35 MIU/ML (ref 0.47–4.68)
WBC NRBC COR # BLD: 10.93 10*3/MM3 (ref 4.8–10.8)

## 2018-07-25 PROCEDURE — A9270 NON-COVERED ITEM OR SERVICE: HCPCS | Performed by: ORTHOPAEDIC SURGERY

## 2018-07-25 PROCEDURE — G8988 SELF CARE GOAL STATUS: HCPCS

## 2018-07-25 PROCEDURE — 97110 THERAPEUTIC EXERCISES: CPT

## 2018-07-25 PROCEDURE — 99024 POSTOP FOLLOW-UP VISIT: CPT | Performed by: PHYSICIAN ASSISTANT

## 2018-07-25 PROCEDURE — 97165 OT EVAL LOW COMPLEX 30 MIN: CPT

## 2018-07-25 PROCEDURE — G8979 MOBILITY GOAL STATUS: HCPCS

## 2018-07-25 PROCEDURE — 63710000001: Performed by: INTERNAL MEDICINE

## 2018-07-25 PROCEDURE — 63710000001 VITAMIN C 500 MG TABLET: Performed by: ORTHOPAEDIC SURGERY

## 2018-07-25 PROCEDURE — G8987 SELF CARE CURRENT STATUS: HCPCS

## 2018-07-25 PROCEDURE — 25010000002 MORPHINE PER 10 MG: Performed by: ORTHOPAEDIC SURGERY

## 2018-07-25 PROCEDURE — 63710000001 LEVOTHYROXINE 88 MCG TABLET: Performed by: ORTHOPAEDIC SURGERY

## 2018-07-25 PROCEDURE — 80048 BASIC METABOLIC PNL TOTAL CA: CPT | Performed by: ORTHOPAEDIC SURGERY

## 2018-07-25 PROCEDURE — 97161 PT EVAL LOW COMPLEX 20 MIN: CPT

## 2018-07-25 PROCEDURE — 63710000001 CYCLOBENZAPRINE 10 MG TABLET: Performed by: ORTHOPAEDIC SURGERY

## 2018-07-25 PROCEDURE — 63710000001 FAMOTIDINE 20 MG TABLET: Performed by: ORTHOPAEDIC SURGERY

## 2018-07-25 PROCEDURE — G8978 MOBILITY CURRENT STATUS: HCPCS

## 2018-07-25 PROCEDURE — 85025 COMPLETE CBC W/AUTO DIFF WBC: CPT | Performed by: ORTHOPAEDIC SURGERY

## 2018-07-25 PROCEDURE — A9270 NON-COVERED ITEM OR SERVICE: HCPCS | Performed by: INTERNAL MEDICINE

## 2018-07-25 PROCEDURE — 25010000002 FONDAPARINUX PER 0.5 MG: Performed by: ORTHOPAEDIC SURGERY

## 2018-07-25 PROCEDURE — 63710000001 HYDROCODONE-ACETAMINOPHEN 7.5-325 MG TABLET: Performed by: ORTHOPAEDIC SURGERY

## 2018-07-25 PROCEDURE — 99224 PR SBSQ OBSERVATION CARE/DAY 15 MINUTES: CPT | Performed by: INTERNAL MEDICINE

## 2018-07-25 PROCEDURE — 84443 ASSAY THYROID STIM HORMONE: CPT | Performed by: INTERNAL MEDICINE

## 2018-07-25 PROCEDURE — 63710000001 CALCIUM-VITAMIN D 500-200 MG-UNIT TABLET: Performed by: ORTHOPAEDIC SURGERY

## 2018-07-25 PROCEDURE — G0378 HOSPITAL OBSERVATION PER HR: HCPCS

## 2018-07-25 PROCEDURE — 97116 GAIT TRAINING THERAPY: CPT

## 2018-07-25 RX ORDER — WHITE PETROLATUM 450 MG/G
1 STICK TOPICAL AS NEEDED
Status: DISCONTINUED | OUTPATIENT
Start: 2018-07-25 | End: 2018-07-27 | Stop reason: HOSPADM

## 2018-07-25 RX ADMIN — FAMOTIDINE 20 MG: 20 TABLET ORAL at 09:01

## 2018-07-25 RX ADMIN — MORPHINE SULFATE 4 MG: 4 INJECTION, SOLUTION INTRAMUSCULAR; INTRAVENOUS at 00:42

## 2018-07-25 RX ADMIN — LEVOTHYROXINE SODIUM 88 MCG: 88 TABLET ORAL at 06:26

## 2018-07-25 RX ADMIN — FONDAPARINUX SODIUM 2.5 MG: 2.5 INJECTION, SOLUTION SUBCUTANEOUS at 09:01

## 2018-07-25 RX ADMIN — SODIUM CHLORIDE, POTASSIUM CHLORIDE, SODIUM LACTATE AND CALCIUM CHLORIDE 100 ML/HR: 600; 310; 30; 20 INJECTION, SOLUTION INTRAVENOUS at 09:07

## 2018-07-25 RX ADMIN — Medication 1 EACH: at 21:13

## 2018-07-25 RX ADMIN — OYSTER SHELL CALCIUM WITH VITAMIN D 1 TABLET: 500; 200 TABLET, FILM COATED ORAL at 09:01

## 2018-07-25 RX ADMIN — CYCLOBENZAPRINE HYDROCHLORIDE 10 MG: 10 TABLET, FILM COATED ORAL at 09:01

## 2018-07-25 RX ADMIN — CEFAZOLIN 1 G: 1 INJECTION, POWDER, FOR SOLUTION INTRAMUSCULAR; INTRAVENOUS; PARENTERAL at 03:59

## 2018-07-25 RX ADMIN — HYDROCODONE BITARTRATE AND ACETAMINOPHEN 1 TABLET: 7.5; 325 TABLET ORAL at 11:05

## 2018-07-25 RX ADMIN — HYDROCODONE BITARTRATE AND ACETAMINOPHEN 1 TABLET: 7.5; 325 TABLET ORAL at 16:20

## 2018-07-25 RX ADMIN — HYDROCODONE BITARTRATE AND ACETAMINOPHEN 1 TABLET: 7.5; 325 TABLET ORAL at 06:26

## 2018-07-25 RX ADMIN — SODIUM CHLORIDE, POTASSIUM CHLORIDE, SODIUM LACTATE AND CALCIUM CHLORIDE 100 ML/HR: 600; 310; 30; 20 INJECTION, SOLUTION INTRAVENOUS at 21:13

## 2018-07-25 RX ADMIN — MORPHINE SULFATE 4 MG: 4 INJECTION, SOLUTION INTRAMUSCULAR; INTRAVENOUS at 04:01

## 2018-07-25 RX ADMIN — OXYCODONE HYDROCHLORIDE AND ACETAMINOPHEN 500 MG: 500 TABLET ORAL at 09:00

## 2018-07-25 NOTE — ADDENDUM NOTE
Addendum  created 07/25/18 1003 by Getachew Head, CRNA    Order list changed, Order sets accessed

## 2018-07-26 PROCEDURE — A9270 NON-COVERED ITEM OR SERVICE: HCPCS | Performed by: ORTHOPAEDIC SURGERY

## 2018-07-26 PROCEDURE — 97116 GAIT TRAINING THERAPY: CPT

## 2018-07-26 PROCEDURE — 63710000001 CETIRIZINE 10 MG TABLET: Performed by: ORTHOPAEDIC SURGERY

## 2018-07-26 PROCEDURE — G0378 HOSPITAL OBSERVATION PER HR: HCPCS

## 2018-07-26 PROCEDURE — 63710000001 CALCIUM-VITAMIN D 500-200 MG-UNIT TABLET: Performed by: ORTHOPAEDIC SURGERY

## 2018-07-26 PROCEDURE — 97110 THERAPEUTIC EXERCISES: CPT

## 2018-07-26 PROCEDURE — 63710000001 HYDROCODONE-ACETAMINOPHEN 7.5-325 MG TABLET: Performed by: ORTHOPAEDIC SURGERY

## 2018-07-26 PROCEDURE — 63710000001 LEVOTHYROXINE 88 MCG TABLET: Performed by: ORTHOPAEDIC SURGERY

## 2018-07-26 PROCEDURE — 99024 POSTOP FOLLOW-UP VISIT: CPT | Performed by: PHYSICIAN ASSISTANT

## 2018-07-26 PROCEDURE — 97530 THERAPEUTIC ACTIVITIES: CPT

## 2018-07-26 PROCEDURE — 63710000001 CYCLOBENZAPRINE 10 MG TABLET: Performed by: ORTHOPAEDIC SURGERY

## 2018-07-26 PROCEDURE — 63710000001 FAMOTIDINE 20 MG TABLET: Performed by: ORTHOPAEDIC SURGERY

## 2018-07-26 PROCEDURE — 63710000001 VITAMIN C 500 MG TABLET: Performed by: ORTHOPAEDIC SURGERY

## 2018-07-26 PROCEDURE — 25010000002 FONDAPARINUX PER 0.5 MG: Performed by: ORTHOPAEDIC SURGERY

## 2018-07-26 RX ORDER — HYDROCODONE BITARTRATE AND ACETAMINOPHEN 7.5; 325 MG/1; MG/1
1 TABLET ORAL EVERY 8 HOURS PRN
Qty: 30 TABLET | Refills: 0 | Status: SHIPPED | OUTPATIENT
Start: 2018-07-26 | End: 2018-08-06

## 2018-07-26 RX ADMIN — CETIRIZINE HYDROCHLORIDE 5 MG: 10 TABLET, FILM COATED ORAL at 10:37

## 2018-07-26 RX ADMIN — LEVOTHYROXINE SODIUM 88 MCG: 88 TABLET ORAL at 06:21

## 2018-07-26 RX ADMIN — HYDROCODONE BITARTRATE AND ACETAMINOPHEN 1 TABLET: 7.5; 325 TABLET ORAL at 22:12

## 2018-07-26 RX ADMIN — SODIUM CHLORIDE, POTASSIUM CHLORIDE, SODIUM LACTATE AND CALCIUM CHLORIDE 100 ML/HR: 600; 310; 30; 20 INJECTION, SOLUTION INTRAVENOUS at 06:28

## 2018-07-26 RX ADMIN — CYCLOBENZAPRINE HYDROCHLORIDE 10 MG: 10 TABLET, FILM COATED ORAL at 22:12

## 2018-07-26 RX ADMIN — HYDROCODONE BITARTRATE AND ACETAMINOPHEN 1 TABLET: 7.5; 325 TABLET ORAL at 10:38

## 2018-07-26 RX ADMIN — FONDAPARINUX SODIUM 2.5 MG: 2.5 INJECTION, SOLUTION SUBCUTANEOUS at 10:38

## 2018-07-26 RX ADMIN — OYSTER SHELL CALCIUM WITH VITAMIN D 1 TABLET: 500; 200 TABLET, FILM COATED ORAL at 10:37

## 2018-07-26 RX ADMIN — HYDROCODONE BITARTRATE AND ACETAMINOPHEN 1 TABLET: 7.5; 325 TABLET ORAL at 06:21

## 2018-07-26 RX ADMIN — OXYCODONE HYDROCHLORIDE AND ACETAMINOPHEN 500 MG: 500 TABLET ORAL at 10:37

## 2018-07-26 RX ADMIN — FAMOTIDINE 20 MG: 20 TABLET ORAL at 10:37

## 2018-07-27 VITALS
HEART RATE: 79 BPM | DIASTOLIC BLOOD PRESSURE: 65 MMHG | WEIGHT: 135.2 LBS | BODY MASS INDEX: 23.96 KG/M2 | RESPIRATION RATE: 19 BRPM | OXYGEN SATURATION: 95 % | HEIGHT: 63 IN | TEMPERATURE: 97.8 F | SYSTOLIC BLOOD PRESSURE: 110 MMHG

## 2018-07-27 LAB
LAB AP CASE REPORT: NORMAL
PATH REPORT.FINAL DX SPEC: NORMAL

## 2018-07-27 PROCEDURE — G8989 SELF CARE D/C STATUS: HCPCS

## 2018-07-27 PROCEDURE — 63710000001 VITAMIN C 500 MG TABLET: Performed by: ORTHOPAEDIC SURGERY

## 2018-07-27 PROCEDURE — A9270 NON-COVERED ITEM OR SERVICE: HCPCS | Performed by: ORTHOPAEDIC SURGERY

## 2018-07-27 PROCEDURE — 63710000001 FAMOTIDINE 20 MG TABLET: Performed by: ORTHOPAEDIC SURGERY

## 2018-07-27 PROCEDURE — G8988 SELF CARE GOAL STATUS: HCPCS

## 2018-07-27 PROCEDURE — 63710000001 HYDROCODONE-ACETAMINOPHEN 7.5-325 MG TABLET: Performed by: ORTHOPAEDIC SURGERY

## 2018-07-27 PROCEDURE — G0378 HOSPITAL OBSERVATION PER HR: HCPCS

## 2018-07-27 PROCEDURE — 97535 SELF CARE MNGMENT TRAINING: CPT

## 2018-07-27 PROCEDURE — 97110 THERAPEUTIC EXERCISES: CPT

## 2018-07-27 PROCEDURE — 63710000001 CALCIUM-VITAMIN D 500-200 MG-UNIT TABLET: Performed by: ORTHOPAEDIC SURGERY

## 2018-07-27 PROCEDURE — 25010000002 FONDAPARINUX PER 0.5 MG: Performed by: ORTHOPAEDIC SURGERY

## 2018-07-27 PROCEDURE — 99024 POSTOP FOLLOW-UP VISIT: CPT | Performed by: PHYSICIAN ASSISTANT

## 2018-07-27 PROCEDURE — 63710000001 LEVOTHYROXINE 88 MCG TABLET: Performed by: ORTHOPAEDIC SURGERY

## 2018-07-27 RX ADMIN — FONDAPARINUX SODIUM 2.5 MG: 2.5 INJECTION, SOLUTION SUBCUTANEOUS at 08:46

## 2018-07-27 RX ADMIN — LEVOTHYROXINE SODIUM 88 MCG: 88 TABLET ORAL at 06:06

## 2018-07-27 RX ADMIN — OYSTER SHELL CALCIUM WITH VITAMIN D 1 TABLET: 500; 200 TABLET, FILM COATED ORAL at 08:46

## 2018-07-27 RX ADMIN — FAMOTIDINE 20 MG: 20 TABLET ORAL at 08:46

## 2018-07-27 RX ADMIN — OXYCODONE HYDROCHLORIDE AND ACETAMINOPHEN 500 MG: 500 TABLET ORAL at 08:46

## 2018-07-27 RX ADMIN — HYDROCODONE BITARTRATE AND ACETAMINOPHEN 1 TABLET: 7.5; 325 TABLET ORAL at 08:45

## 2018-08-06 ENCOUNTER — OFFICE VISIT (OUTPATIENT)
Dept: ORTHOPEDIC SURGERY | Facility: CLINIC | Age: 83
End: 2018-08-06

## 2018-08-06 VITALS — BODY MASS INDEX: 22.39 KG/M2 | HEIGHT: 63 IN | RESPIRATION RATE: 12 BRPM | WEIGHT: 126.4 LBS

## 2018-08-06 DIAGNOSIS — Z96.651 STATUS POST TOTAL RIGHT KNEE REPLACEMENT USING CEMENT: Primary | ICD-10-CM

## 2018-08-06 PROCEDURE — 99024 POSTOP FOLLOW-UP VISIT: CPT | Performed by: ORTHOPAEDIC SURGERY

## 2018-08-06 RX ORDER — LEVOTHYROXINE SODIUM 0.1 MG/1
TABLET ORAL
Qty: 90 TABLET | Refills: 1 | Status: SHIPPED | OUTPATIENT
Start: 2018-08-06 | End: 2019-01-30 | Stop reason: SDUPTHER

## 2018-08-06 NOTE — PROGRESS NOTES
Subjective   Patient ID: Fanta Rodney is a 82 y.o.  female is here today for a post-operative visit.    Post-op of the Right Knee     History of Present Illness     Pain controlled: [] no   [x] yes, OTC acetamenophen once or twice daily.   Medication refill requested: [x] no   [] yes    Patient compliant with instructions: [] no   [x] yes   Other: Reports excellent early progress since surgery.     Past Medical History:   Diagnosis Date   • Anxiety    • Arthritis    • Bilateral cataracts     HISTORY OF HAS HAD REMOVED   • Body piercing     EARS ONLY   • Choledocholithiasis    • Disease of thyroid gland    • GERD (gastroesophageal reflux disease)    • History of esophagogastroduodenoscopy (EGD) 09/06/2013   • History of toe fracture 10/2012    Left middle toe   • Murmur    • Nausea with vomiting    • Osteoarthritis    • Stress fracture of metatarsal bone 06/02/2013    Left Foot - Treated by Dr. Manley   • Tear of meniscus of knee    • Urinary tract infection    • Wears glasses    • Wears partial dentures     LOWER ONLY        Past Surgical History:   Procedure Laterality Date   • ADENOIDECTOMY     • CATARACT EXTRACTION Right 08/31/2016    Dr. Michele Damian   • CATARACT EXTRACTION Left 09/29/2016    Dr. Michele Damian    • CHOLECYSTECTOMY  09/27/2013    Dr. Alexis Lobo   • COLONOSCOPY  02/08/2013    Dr. Alexis Gary   • DILATION AND CURETTAGE, DIAGNOSTIC / THERAPEUTIC  1967   • ENDOSCOPY     • FINGER SURGERY Right 08/17/2006    2 fingers - joint replacement - Dr. Suh - [place]   • THYROIDECTOMY  10/09/1967    Dr. Christiano Beard - [place], [place] - Diseased Thyroid (Goiter)   • TONSILLECTOMY     • TOTAL KNEE ARTHROPLASTY Right 7/24/2018    Procedure: Elective right total knee replacement (BIOMET);  Surgeon: Oscar Snow MD;  Location: [facility];  Service: Orthopedics       Allergies   Allergen Reactions   • Nexium [Esomeprazole Magnesium] GI Intolerance       Review of Systems   Constitutional: Negative for  "fever.   HENT: Negative for voice change.    Eyes: Negative for visual disturbance.   Respiratory: Positive for cough (mild, improving, patient encouraged and guided with breathing exercise and incentive spirometry.). Negative for shortness of breath.    Cardiovascular: Negative for chest pain.   Gastrointestinal: Negative for abdominal distention and abdominal pain.   Genitourinary: Negative for dysuria.   Musculoskeletal: Positive for arthralgias. Negative for gait problem and joint swelling.   Skin: Negative for rash.   Neurological: Negative for speech difficulty.   Hematological: Does not bruise/bleed easily.   Psychiatric/Behavioral: Negative for confusion.       Objective   Resp 12   Ht 160 cm (63\")   Wt 57.3 kg (126 lb 6.4 oz)   BMI 22.39 kg/m²       Signs of infection: [x] no                    [] yes   Drainage: [x] no                    [] yes   Incision: [x] healing well     []healed well   Motor exam intact: [] no                    [x] yes   Neurovascular exam intact: [] no                    [x] yes   Signs of compartment syndrome: [x] no                    [] yes   Signs of DVT: [x] no                    [] yes   Other:      Physical Exam   Constitutional: She appears well-developed. No distress.   Musculoskeletal: She exhibits no deformity.        Right knee: She exhibits no effusion.   Neurological: She is alert. Gait normal.   Skin: Skin is warm and dry. No rash noted. No erythema.   Psychiatric: She has a normal mood and affect. Her speech is normal.   Vitals reviewed.    Right Knee Exam     Tenderness   The patient is experiencing no tenderness.         Range of Motion   Extension: 0   Flexion: 100     Muscle Strength     The patient has normal right knee strength.    Tests   Varus: negative  Valgus: negative  Patellar Apprehension: negative    Other   Erythema: absent  Scars: present (healing well)  Pulse: present  Other tests: no effusion present        Extremity DVT signs are Negative on " physical exam with negative Rosa sign, with no calf pain, with no palpable cords, with no increased pain with passive stretch/extension and with no skin tone change  Neurologic Exam     Mental Status   Attention: normal.   Speech: speech is normal   Level of consciousness: alert  Knowledge: good.     Motor Exam   Overall muscle tone: normal    Gait, Coordination, and Reflexes     Gait  Gait: normal    Right Knee Exam     Muscle Strength   Normal right knee strength        Assessment/Plan   Independent Review of Radiographic Studies:    No new imaging done today.  Reviewed with patient at a prior visit.  Laboratory and Other Studies:  No new results reviewed today.   Medical Decision Making:    No complications of procedure and treatments.  Stable neurovascular exam.  Good progress, significantly improved.  Continue current plan, and management.  Surgical treatment of chronic injury condition.  Physical and occupational therapy arranged and ongoing.    Procedures     Fanta was seen today for post-op.    Diagnoses and all orders for this visit:    Status post total right knee replacement using cement  -     Ambulatory Referral to Physical Therapy Evaluate and treat; ROM, Stretching; Right         Recommendations/Plan:     Sutures Staples or Pins [x] Removed today  [] At prior visit  [] Plan removal later   Physical therapy: [x][facility] PT/OT  [x]outpatient referral also given as planned.   Ultrasound: [x]not ordered         []order given to patient   Labs: [x]not ordered         []order given to patient   Weight Bearing status: [x]Full []WBAT []PWB []NWB []Other     Discussion of orthopaedic goals and activities and patient and/or guardian expressed appreciation.  Regular exercise as tolerated  Guided on proper techniques for mobility, strength, agility and/or conditioning exercises  Elevate leg for residual swelling  Ice, heat, and/or modalities as beneficial  Physical therapy referral given  Take  prescribed medications as instructed only as tolerated  Counseling on fitness and conditioning exercises      Exercise, medications, injections, other patient advice, and return appointment as noted.  Brace: No brace was given at today's visit  Referral: Physical and/or Occupational Therapy referral  Test/Studies: No additional studies ordered.  Surgery: No surgery proposed at this visit.  Work/Activity Status: May perform usual activities as tolerated, May gradually return to routine exercise and physical work as tolerated. and No strenuous activity.  Patient is encouraged to call or return for any issues or concerns.    Return in about 2 months (around 10/6/2018) for Recheck.  Patient agreeable to call or return sooner for any concerns.      Portions of this note have been scribed for Oscar Snow MD by Debi Blancas CMA.. 8/7/2018  2:06 PM

## 2018-08-16 ENCOUNTER — TELEPHONE (OUTPATIENT)
Dept: INTERNAL MEDICINE | Facility: CLINIC | Age: 83
End: 2018-08-16

## 2018-08-16 NOTE — TELEPHONE ENCOUNTER
Patient noticed her levothyroxine changed from .88 to 1. She isnt forsure why. She would like to know if its ok to take and why it changed.

## 2018-08-17 NOTE — TELEPHONE ENCOUNTER
Advised Pt that she's been prescribed 100 ug for the last year or so and that it's been a while since she's had 88 ug. Pt states she never noticed the dosage change before. Advised Pt her TSH level has been normal on the 100 ug.

## 2018-10-08 ENCOUNTER — OFFICE VISIT (OUTPATIENT)
Dept: ORTHOPEDIC SURGERY | Facility: CLINIC | Age: 83
End: 2018-10-08

## 2018-10-08 VITALS — BODY MASS INDEX: 23.42 KG/M2 | RESPIRATION RATE: 18 BRPM | HEIGHT: 63 IN | WEIGHT: 132.2 LBS

## 2018-10-08 DIAGNOSIS — Z96.651 STATUS POST TOTAL RIGHT KNEE REPLACEMENT USING CEMENT: Primary | ICD-10-CM

## 2018-10-08 PROCEDURE — 99024 POSTOP FOLLOW-UP VISIT: CPT | Performed by: ORTHOPAEDIC SURGERY

## 2018-10-08 RX ORDER — COVID-19 ANTIGEN TEST
KIT MISCELLANEOUS
COMMUNITY
Start: 2018-09-01 | End: 2020-03-04

## 2018-10-08 NOTE — PROGRESS NOTES
Subjective   Patient ID: Fanta Rodney is a 83 y.o.  female is here today for a post-operative visit.  Post-op of the Right Knee       CHIEF COMPLAINT:    Progress and recovery after surgery. Patient would like to discuss it is safe to kneel for [facility] with right knee now that she has a knee replacement. We reviewed kneeling is safe though tolerance for kneeling is usually limited as the skin scar can feel sore.     History of Present Illness      Pain controlled: [] no   [x] yes, taking Aleve for generalized arthritis pain including both hands   Medication refill requested: [x] no   [] yes    Patient compliant with instructions: [] no   [x] yes   Other: Reports excellent right knee progress since surgery.     Past Medical History:   Diagnosis Date   • Anxiety    • Arthritis    • Bilateral cataracts     HISTORY OF HAS HAD REMOVED   • Body piercing     EARS ONLY   • Choledocholithiasis    • Disease of thyroid gland    • GERD (gastroesophageal reflux disease)    • History of esophagogastroduodenoscopy (EGD) 09/06/2013   • History of toe fracture 10/2012    Left middle toe   • Murmur    • Nausea with vomiting    • Osteoarthritis    • Stress fracture of metatarsal bone 06/02/2013    Left Foot - Treated by Dr. Manley   • Tear of meniscus of knee    • Urinary tract infection    • Wears glasses    • Wears partial dentures     LOWER ONLY        Past Surgical History:   Procedure Laterality Date   • ADENOIDECTOMY     • CATARACT EXTRACTION Right 08/31/2016    Dr. Michele Damian   • CATARACT EXTRACTION Left 09/29/2016    Dr. Michele Damian    • CHOLECYSTECTOMY  09/27/2013    Dr. Alexis Lobo   • COLONOSCOPY  02/08/2013    Dr. Alexis Gary   • DILATION AND CURETTAGE, DIAGNOSTIC / THERAPEUTIC  1967   • ENDOSCOPY     • FINGER SURGERY Right 08/17/2006    2 fingers - joint replacement - Dr. Suh - [facility]   • THYROIDECTOMY  10/09/1967    [facility] - Diseased Thyroid (Goiter)   • TONSILLECTOMY     • TOTAL  "KNEE ARTHROPLASTY Right 7/24/2018    Procedure: Elective right total knee replacement (BIOMET);  Surgeon: Oscar Snow MD;  Location: [facility];  Service: Orthopedics       Allergies   Allergen Reactions   • Nexium [Esomeprazole Magnesium] GI Intolerance       Review of Systems   Constitutional: Negative for fever.   Respiratory: Negative for shortness of breath.    Cardiovascular: Negative for chest pain.   Gastrointestinal: Negative for abdominal distention and abdominal pain.   Musculoskeletal: Negative for arthralgias, gait problem and joint swelling.   Skin: Negative for rash.   Hematological: Does not bruise/bleed easily.       Objective   Resp 18   Ht 160 cm (63\")   Wt 60 kg (132 lb 3.2 oz)   BMI 23.42 kg/m²       Signs of infection: [x] no                    [] yes   Drainage: [x] no                    [] yes   Incision: [x] healing well     [x]healed well   Motor exam intact: [] no                    [x] yes   Neurovascular exam intact: [] no                    [x] yes   Signs of compartment syndrome: [x] no                    [] yes   Signs of DVT: [x] no                    [] yes   Other:      Physical Exam   Constitutional: She appears well-developed. No distress.   Musculoskeletal: She exhibits no deformity.        Right knee: She exhibits no effusion.   Neurological: She is alert. Gait normal.   Skin: Skin is warm and dry. No rash noted. No erythema.   Psychiatric: She has a normal mood and affect. Her speech is normal.   Vitals reviewed.    Right Knee Exam     Tenderness   The patient is experiencing no tenderness.         Range of Motion   Extension: 0   Right knee flexion: 125 degrees.     Muscle Strength     The patient has normal right knee strength.    Tests   Varus: negative  Valgus: negative  Pivot Shift: negative    Other   Erythema: absent  Scars: present (well healed)  Pulse: present  Swelling: none  Other tests: no effusion present        Extremity DVT signs are Negative on " physical exam with negative Rosa sign, with no calf pain, with no palpable cords, with no increased pain with passive stretch/extension and with no skin tone change  Neurologic Exam     Mental Status   Attention: normal.   Speech: speech is normal   Level of consciousness: alert  Knowledge: good.     Motor Exam   Overall muscle tone: normal    Gait, Coordination, and Reflexes     Gait  Gait: normal    Right Knee Exam     Muscle Strength   Normal right knee strength        Assessment/Plan   Independent Review of Radiographic Studies:    No new imaging done today.  Laboratory and Other Studies:  No new results reviewed today.   Medical Decision Making:    No complications of procedure and treatments.  Stable neurovascular exam.  Excellent progress.  May resume routine activity, work and/or exercise as tolerated.     Procedures     Fanta was seen today for post-op.    Diagnoses and all orders for this visit:    Status post total right knee replacement using cement      Recommendations/Plan:     Sutures Staples or Pins [] Removed today  [x] At prior visit  [] Plan removal later   Physical therapy: [x] outpatient rehab near complete with [facility]   Ultrasound: [x]not ordered         []order given to patient   Labs: [x]not ordered         []order given to patient   Weight Bearing status: [x]Full []WBAT []PWB []NWB []Other     Discussion of orthopaedic goals and activities and patient and/or guardian expressed appreciation.  Regular exercise as tolerated  Guided on proper techniques for mobility, strength, agility and/or conditioning exercises  Ice, heat, and/or modalities as beneficial  Physical therapy program ongoing  Counseling on fitness and conditioning exercises  Aftercare and dental prophylaxis after knee replacement surgery.    Exercise, medications, injections, other patient advice, and return appointment as noted.  Brace: No brace was given at today's visit  Referral: No referrals made at  today's visit  Test/Studies: No additional studies ordered.  Surgery: No surgery proposed at this visit.  Work/Activity Status: May perform usual activities as tolerated, May return to routine exercise and physical work as tolerated. and No strenuous activity.  Patient is encouraged to call or return for any issues or concerns.    Return in about 9 months (around 7/8/2019) for XOA right knee, repeat exam, update plan.  Patient agreeable to call or return sooner for any concerns.      Portions of this note have been scribed for Oscar Snow MD by Debi Blancas CMA.. 10/8/2018  1:30 PM

## 2018-10-22 ENCOUNTER — OFFICE VISIT (OUTPATIENT)
Dept: INTERNAL MEDICINE | Facility: CLINIC | Age: 83
End: 2018-10-22

## 2018-10-22 VITALS
DIASTOLIC BLOOD PRESSURE: 68 MMHG | BODY MASS INDEX: 23.74 KG/M2 | HEART RATE: 63 BPM | WEIGHT: 134 LBS | SYSTOLIC BLOOD PRESSURE: 126 MMHG | HEIGHT: 63 IN | OXYGEN SATURATION: 97 % | TEMPERATURE: 97.4 F

## 2018-10-22 DIAGNOSIS — M17.11 PRIMARY OSTEOARTHRITIS OF RIGHT KNEE: ICD-10-CM

## 2018-10-22 DIAGNOSIS — K21.00 GASTROESOPHAGEAL REFLUX DISEASE WITH ESOPHAGITIS: ICD-10-CM

## 2018-10-22 DIAGNOSIS — E55.9 VITAMIN D DEFICIENCY: ICD-10-CM

## 2018-10-22 DIAGNOSIS — E03.8 OTHER SPECIFIED HYPOTHYROIDISM: ICD-10-CM

## 2018-10-22 DIAGNOSIS — Z00.00 ENCOUNTER FOR MEDICARE ANNUAL WELLNESS EXAM: Primary | ICD-10-CM

## 2018-10-22 PROBLEM — M79.672 LEFT FOOT PAIN: Status: RESOLVED | Noted: 2018-04-25 | Resolved: 2018-10-22

## 2018-10-22 PROCEDURE — 90662 IIV NO PRSV INCREASED AG IM: CPT | Performed by: INTERNAL MEDICINE

## 2018-10-22 PROCEDURE — G0439 PPPS, SUBSEQ VISIT: HCPCS | Performed by: INTERNAL MEDICINE

## 2018-10-22 PROCEDURE — G0008 ADMIN INFLUENZA VIRUS VAC: HCPCS | Performed by: INTERNAL MEDICINE

## 2018-10-22 NOTE — PROGRESS NOTES
QUICK REFERENCE INFORMATION:  The ABCs of the Annual Wellness Visit    Subsequent Medicare Wellness Visit    HEALTH RISK ASSESSMENT    1935    Recent Hospitalizations:  Recently treated at the following:  [facility].        Current Medical Providers:  Patient Care Team:  Vermeesch, Marilyn K, MD as PCP - General  [place], Saurav Lorenzo MD as PCP - Claims Attributed        Smoking Status:  History   Smoking Status   • Never Smoker   Smokeless Tobacco   • Never Used       Alcohol Consumption:  History   Alcohol Use   • Yes     Comment: OCCASIONALLY       Depression Screen:   PHQ-2/PHQ-9 Depression Screening 10/22/2018   Little interest or pleasure in doing things 0   Feeling down, depressed, or hopeless 0   Total Score 0       Health Habits and Functional and Cognitive Screening:  Functional & Cognitive Status 10/22/2018   Do you have difficulty preparing food and eating? No   Do you have difficulty bathing yourself, getting dressed or grooming yourself? No   Do you have difficulty using the toilet? No   Do you have difficulty moving around from place to place? No   Do you have trouble with steps or getting out of a bed or a chair? No   In the past year have you fallen or experienced a near fall? No   Current Diet Limited Junk Food   Dental Exam Up to date   Eye Exam Up to date   Exercise (times per week) 7 times per week   Do you need help using the phone?  No   Are you deaf or do you have serious difficulty hearing?  No   Do you need help with transportation? No   Do you need help shopping? No   Do you need help preparing meals?  No   Do you need help with housework?  No   Do you need help with laundry? No   Do you need help taking your medications? No   Do you need help managing money? No   Do you ever drive or ride in a car without wearing a seat belt? No   Have you felt unusual stress, anger or loneliness in the last month? No   Who do you live with? Alone   If you need help, do you have  trouble finding someone available to you? No   Do you have difficulty concentrating, remembering or making decisions? No           Does the patient have evidence of cognitive impairment? No    Aspirin use counseling: Start ASA 81 mg daily       Recent Lab Results:  CMP:  Lab Results   Component Value Date    GLU 88 06/08/2017    BUN 15 07/25/2018    CREATININE 0.80 07/25/2018    EGFRIFNONA 69 07/25/2018    EGFRIFAFRI 73 06/08/2017    BCR 18.8 07/25/2018     07/25/2018    K 4.3 07/25/2018    CO2 30.0 07/25/2018    CALCIUM 9.1 07/25/2018    PROTENTOTREF 6.8 06/08/2017    ALBUMIN 4.10 07/09/2018    LABGLOBREF 2.9 06/08/2017    LABIL2 1.3 06/08/2017    BILITOT 0.5 07/09/2018    ALKPHOS 77 07/09/2018    AST 30 07/09/2018    ALT 24 07/09/2018     Lipid Panel:     HbA1c:  Lab Results   Component Value Date    HGBA1C 5.7 07/09/2018       Visual Acuity:  No exam data present    Age-appropriate Screening Schedule:  Refer to the list below for future screening recommendations based on patient's age, sex and/or medical conditions. Orders for these recommended tests are listed in the plan section. The patient has been provided with a written plan.    Health Maintenance   Topic Date Due   • ZOSTER VACCINE (2 of 2) 02/25/2013   • PNEUMOCOCCAL VACCINES (65+ LOW/MEDIUM RISK) (2 of 2 - PPSV23) 12/31/2013   • MAMMOGRAM  06/08/2016   • INFLUENZA VACCINE  08/01/2018   • TDAP/TD VACCINES  Excluded        Subjective   History of Present Illness    Fanta Rodney is a 83 y.o. female who presents for an Subsequent Wellness Visit.  Past medical history of allergies, GERD, vitamin D deficiency, hypothyroidism, osteoarthritis.  Not bothered by allergies much.  She takes zantac in AM and it controls her GERD well.  She takes calcium with vit D.  She takes her thyroid med in AM on empty stomach.  Takes aleve once a day with food. She is done with PT for her right TKR.      The following portions of the patient's history were reviewed  and updated as appropriate: allergies, current medications, past family history, past medical history, past social history, past surgical history and problem list.    Outpatient Medications Prior to Visit   Medication Sig Dispense Refill   • Ascorbic Acid (VITAMIN C) 500 MG capsule Take  by mouth daily.     • Calcium Carb-Cholecalciferol (CALCIUM + D3) 600-200 MG-UNIT tablet Take  by mouth daily.     • Cholecalciferol (VITAMIN D-3) 1000 UNITS capsule Take  by mouth daily.     • levothyroxine (SYNTHROID, LEVOTHROID) 100 MCG tablet TAKE 1 TABLET BY MOUTH  DAILY 90 tablet 1   • MULTIPLE VITAMINS PO Take  by mouth daily.     • Naproxen Sodium (ALEVE) 220 MG capsule      • raNITIdine (ZANTAC) 150 MG tablet TAKE 1 TABLET BY MOUTH  DAILY 90 tablet 3   • Turmeric 450 MG capsule Take 1,050 mg by mouth Daily.       No facility-administered medications prior to visit.        Patient Active Problem List   Diagnosis   • Gastroesophageal reflux disease with esophagitis   • Hypothyroidism   • Vitamin D deficiency   • Arthritis   • Seasonal allergic rhinitis due to pollen   • Primary osteoarthritis of right knee   • Encounter for Medicare annual wellness exam       Advance Care Planning:  has an advance directive - a copy HAS NOT been provided. Have asked the patient to send this to us to add to record.    Identification of Risk Factors:  Risk factors include: none.    Review of Systems   Constitutional: Negative.    HENT: Negative.    Eyes: Negative.    Respiratory: Negative.    Cardiovascular: Positive for leg swelling.   Gastrointestinal: Negative.    Endocrine: Negative.    Genitourinary: Positive for frequency and urgency.   Musculoskeletal: Positive for arthralgias.   Skin: Negative.    Allergic/Immunologic: Negative.    Neurological: Negative.    Hematological: Negative.    Psychiatric/Behavioral: Negative.        Compared to one year ago, the patient feels her physical health is the same.  Compared to one year ago, the  "patient feels her mental health is the same.    Objective     Physical Exam   Constitutional: She is oriented to person, place, and time. She appears well-developed and well-nourished.   Very pleasant female in no apparent distress, appears younger than stated age   HENT:   Head: Normocephalic and atraumatic.   Right Ear: External ear normal.   Left Ear: External ear normal.   Mouth/Throat: Oropharynx is clear and moist.   Eyes: Pupils are equal, round, and reactive to light. Conjunctivae and EOM are normal.   Neck: Normal range of motion. Neck supple. No thyromegaly present.   Cardiovascular: Normal rate, regular rhythm and intact distal pulses.    Murmur heard.  Systolic murmur grade 2/6 loudest at left lower sternal border   Pulmonary/Chest: Effort normal and breath sounds normal. No respiratory distress. She has no wheezes.   Abdominal: Soft. Bowel sounds are normal. She exhibits no distension. There is no tenderness.   Musculoskeletal: Normal range of motion.   Right knee with well-healed surgical scar and good range of motion   Lymphadenopathy:     She has no cervical adenopathy.   Neurological: She is alert and oriented to person, place, and time. No cranial nerve deficit. Coordination normal.   Psychiatric: She has a normal mood and affect. Her behavior is normal. Judgment and thought content normal.   Nursing note and vitals reviewed.      Vitals:    10/22/18 1302   BP: 126/68   Pulse: 63   Temp: 97.4 °F (36.3 °C)   SpO2: 97%   Weight: 60.8 kg (134 lb)   Height: 160 cm (63\")   PainSc: 0-No pain       Patient's Body mass index is 23.74 kg/m². BMI is within normal parameters. No follow-up required.      Assessment/Plan   Patient Self-Management and Personalized Health Advice  The patient has been provided with information about: none and preventive services including:   · Exercise counseling provided, Influenza vaccine, shingles vaccine at pharmacy.    Visit Diagnoses:    ICD-10-CM ICD-9-CM   1. Encounter for " Medicare annual wellness exam Z00.00 V70.0   2. Gastroesophageal reflux disease with esophagitis K21.0 530.11   3. Vitamin D deficiency E55.9 268.9   4. Other specified hypothyroidism E03.8 244.8   5. Primary osteoarthritis of right knee M17.11 715.16       No orders of the defined types were placed in this encounter.      Outpatient Encounter Prescriptions as of 10/22/2018   Medication Sig Dispense Refill   • Ascorbic Acid (VITAMIN C) 500 MG capsule Take  by mouth daily.     • Calcium Carb-Cholecalciferol (CALCIUM + D3) 600-200 MG-UNIT tablet Take  by mouth daily.     • Cholecalciferol (VITAMIN D-3) 1000 UNITS capsule Take  by mouth daily.     • levothyroxine (SYNTHROID, LEVOTHROID) 100 MCG tablet TAKE 1 TABLET BY MOUTH  DAILY 90 tablet 1   • MULTIPLE VITAMINS PO Take  by mouth daily.     • Naproxen Sodium (ALEVE) 220 MG capsule      • raNITIdine (ZANTAC) 150 MG tablet TAKE 1 TABLET BY MOUTH  DAILY 90 tablet 3   • Turmeric 450 MG capsule Take 1,050 mg by mouth Daily.       No facility-administered encounter medications on file as of 10/22/2018.        Reviewed use of high risk medication in the elderly: yes  Reviewed for potential of harmful drug interactions in the elderly: no     Take ASA 81 mg daily  Request copy of living will for chart  Recommend shingrix vaccine  Reviewed labs done while in hospital in July  Continue calcium and vit D  Given flu shot today    RTC 6 mo for regular visit      Follow Up:  Return in about 6 months (around 4/22/2019) for Next scheduled follow up.     An After Visit Summary and PPPS with all of these plans were given to the patient.

## 2019-01-30 RX ORDER — RANITIDINE 150 MG/1
TABLET ORAL
Qty: 90 TABLET | Refills: 3 | Status: SHIPPED | OUTPATIENT
Start: 2019-01-30 | End: 2019-09-20

## 2019-01-30 RX ORDER — LEVOTHYROXINE SODIUM 0.1 MG/1
TABLET ORAL
Qty: 90 TABLET | Refills: 1 | Status: SHIPPED | OUTPATIENT
Start: 2019-01-30 | End: 2019-07-29 | Stop reason: SDUPTHER

## 2019-02-15 ENCOUNTER — OFFICE VISIT (OUTPATIENT)
Dept: INTERNAL MEDICINE | Facility: CLINIC | Age: 84
End: 2019-02-15

## 2019-02-15 ENCOUNTER — TELEPHONE (OUTPATIENT)
Dept: INTERNAL MEDICINE | Facility: CLINIC | Age: 84
End: 2019-02-15

## 2019-02-15 VITALS
WEIGHT: 139 LBS | TEMPERATURE: 98 F | BODY MASS INDEX: 24.62 KG/M2 | OXYGEN SATURATION: 98 % | HEART RATE: 77 BPM | DIASTOLIC BLOOD PRESSURE: 70 MMHG | SYSTOLIC BLOOD PRESSURE: 120 MMHG

## 2019-02-15 DIAGNOSIS — H00.012 HORDEOLUM EXTERNUM OF RIGHT LOWER EYELID: Primary | ICD-10-CM

## 2019-02-15 PROCEDURE — 99213 OFFICE O/P EST LOW 20 MIN: CPT | Performed by: NURSE PRACTITIONER

## 2019-02-15 RX ORDER — ERYTHROMYCIN 5 MG/G
OINTMENT OPHTHALMIC 2 TIMES DAILY
Qty: 1 G | Refills: 0 | Status: SHIPPED | OUTPATIENT
Start: 2019-02-15 | End: 2019-02-20

## 2019-02-15 NOTE — PROGRESS NOTES
Chief Complaint / Reason:      Chief Complaint   Patient presents with   • Stye     on right eye been there since the 5th       Subjective     HPI  Patient presents today with complaints stye on right colon.  Patient states it has been there since the 5th of February .  She denies any injury or trauma.  She states she was out raking leaves and woke up with it the next morning.  She has tried warm compresses with minimal relief.  She denies fever or chills and denies any blurred vision or upper respiratory symptoms.  History taken from: patient    PMH/FH/Social History were reviewed and updated appropriately in the electronic medical record.     Review of Systems:   Review of Systems   Constitutional: Negative.    Eyes: Positive for redness and itching.        Stye      Respiratory: Negative.    Cardiovascular: Negative.    Gastrointestinal: Negative.    Neurological: Negative.      All other systems were reviewed and are negative.  Exceptions are noted in the subjective or above.      Objective     Vital Signs  Vitals:    02/15/19 1146   BP: 120/70   Pulse: 77   Temp: 98 °F (36.7 °C)   SpO2: 98%       Body mass index is 24.62 kg/m².    Physical Exam   Constitutional: She is oriented to person, place, and time. She appears well-developed and well-nourished. No distress.   Eyes: Right eye exhibits hordeolum.       Cardiovascular: Normal rate, regular rhythm, normal heart sounds and intact distal pulses.   Pulmonary/Chest: Effort normal and breath sounds normal. She has no wheezes. She exhibits no tenderness.   Neurological: She is alert and oriented to person, place, and time.   Skin: Skin is warm and dry. No rash noted. No erythema. No pallor.   Psychiatric: She has a normal mood and affect. Her behavior is normal. Judgment and thought content normal.   Nursing note and vitals reviewed.       Results Review:    I reviewed the patient's previous clinical results.       Medication Review:     Current Outpatient  Medications:   •  Ascorbic Acid (VITAMIN C) 500 MG capsule, Take  by mouth daily., Disp: , Rfl:   •  Calcium Carb-Cholecalciferol (CALCIUM + D3) 600-200 MG-UNIT tablet, Take  by mouth daily., Disp: , Rfl:   •  Cholecalciferol (VITAMIN D-3) 1000 UNITS capsule, Take  by mouth daily., Disp: , Rfl:   •  levothyroxine (SYNTHROID, LEVOTHROID) 100 MCG tablet, TAKE 1 TABLET BY MOUTH  DAILY, Disp: 90 tablet, Rfl: 1  •  MULTIPLE VITAMINS PO, Take  by mouth daily., Disp: , Rfl:   •  Naproxen Sodium (ALEVE) 220 MG capsule, , Disp: , Rfl:   •  raNITIdine (ZANTAC) 150 MG tablet, TAKE 1 TABLET BY MOUTH  DAILY, Disp: 90 tablet, Rfl: 3  •  Turmeric 450 MG capsule, Take 1,050 mg by mouth Daily., Disp: , Rfl:   •  erythromycin (ROMYCIN) 5 MG/GM ophthalmic ointment, Administer  to the right eye 2 (Two) Times a Day for 5 days., Disp: 1 g, Rfl: 0    Assessment/Plan   Fanta was seen today for stye.    Diagnoses and all orders for this visit:    Hordeolum externum of right lower eyelid  -     erythromycin (ROMYCIN) 5 MG/GM ophthalmic ointment; Administer  to the right eye 2 (Two) Times a Day for 5 days.    Discussed home care instructions and recommend patient avoid rubbing eyes and apply warm moist heat.  Discussed worsening signs and symptoms.    Return if symptoms worsen or fail to improve.    Elizabeth Young, APRN  02/15/2019

## 2019-02-15 NOTE — TELEPHONE ENCOUNTER
Pt LVM at 915am, she said she has a stai on her lower eyelid, the pharmacist told her she needs an antibiotic cream, pt is wondering if something can be called in or if she needs an appointment.    Phone #[phone]

## 2019-04-22 ENCOUNTER — OFFICE VISIT (OUTPATIENT)
Dept: INTERNAL MEDICINE | Facility: CLINIC | Age: 84
End: 2019-04-22

## 2019-04-22 VITALS
HEART RATE: 91 BPM | SYSTOLIC BLOOD PRESSURE: 118 MMHG | TEMPERATURE: 98 F | BODY MASS INDEX: 23.92 KG/M2 | HEIGHT: 63 IN | WEIGHT: 135 LBS | OXYGEN SATURATION: 97 % | DIASTOLIC BLOOD PRESSURE: 60 MMHG

## 2019-04-22 DIAGNOSIS — J30.1 SEASONAL ALLERGIC RHINITIS DUE TO POLLEN: ICD-10-CM

## 2019-04-22 DIAGNOSIS — K21.00 GASTROESOPHAGEAL REFLUX DISEASE WITH ESOPHAGITIS: ICD-10-CM

## 2019-04-22 DIAGNOSIS — E55.9 VITAMIN D DEFICIENCY: ICD-10-CM

## 2019-04-22 DIAGNOSIS — M19.90 ARTHRITIS: ICD-10-CM

## 2019-04-22 DIAGNOSIS — E03.8 OTHER SPECIFIED HYPOTHYROIDISM: Primary | ICD-10-CM

## 2019-04-22 PROCEDURE — 99213 OFFICE O/P EST LOW 20 MIN: CPT | Performed by: INTERNAL MEDICINE

## 2019-04-22 NOTE — PROGRESS NOTES
Chief Complaint   Patient presents with   • Follow-up     for GERD and Hypothyroidism. Would like right eye looked at and discuss aspirin.      Subjective   Fanta Rodney is a 83 y.o. female.     Here today for follow-up of GERD, vitamin D deficiency, hypothyroidism, DJD and allergies.  GERD-  Zantac is working well with once a day dosing  Vit D def- she takes vit D 1000 u a day, also on calcium 600 mg daily  Hypothyroid- she takes levothyroid in AM on empty stomach.  She denies any difficulty swallowing, change in voice, heat or cold intolerance  DJD- she takes aleve once a day with food.  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  She uses claritin prn.   HCM- she got one shingles vaccine so far.  Still has not had the Hep A vaccine.  Her pneumovax is UTD.   She would like to know whether or not she needs to continue on aspirin daily.  She had a stye on right mid lower eyelid approximately 2 months ago.  She still has a small nodule there.         The following portions of the patient's history were reviewed and updated as appropriate: allergies, current medications, past family history, past medical history, past social history, past surgical history and problem list.    Review of Systems   Constitutional: Negative for activity change, appetite change and unexpected weight change.   HENT: Negative for trouble swallowing and voice change.         Dry nasal mucosa   Eyes: Negative for visual disturbance.        Nodule on right mid lower eyelid   Respiratory: Negative for shortness of breath.    Cardiovascular: Negative for chest pain, palpitations and leg swelling.   Gastrointestinal: Negative for abdominal pain.   Endocrine: Negative for cold intolerance and heat intolerance.   Musculoskeletal: Positive for arthralgias.   Allergic/Immunologic: Positive for environmental allergies.   Neurological: Negative for headaches.  "  Psychiatric/Behavioral: Negative for dysphoric mood and sleep disturbance. The patient is not nervous/anxious.    All other systems reviewed and are negative.      Objective   /60   Pulse 91   Temp 98 °F (36.7 °C)   Ht 160 cm (63\")   Wt 61.2 kg (135 lb)   SpO2 97%   BMI 23.91 kg/m²   Body mass index is 23.91 kg/m².  Physical Exam   Constitutional: She is oriented to person, place, and time. She appears well-developed and well-nourished. No distress.   Very pleasant female who appears her stated age in no distress today   HENT:   Head: Normocephalic and atraumatic.   Right Ear: External ear normal.   Left Ear: External ear normal.   Mouth/Throat: Oropharynx is clear and moist.   Eyes: Conjunctivae and EOM are normal. Pupils are equal, round, and reactive to light.   Right lower eyelid with approximate 6 mm nodule midway across eyelid, mild erythema, no tenderness   Neck: Normal range of motion. Neck supple. No thyromegaly present.   Cardiovascular: Normal rate, regular rhythm and intact distal pulses.   Murmur heard.  No carotid bruits  Systolic murmur grade 2/6 heard over precordium   Pulmonary/Chest: Effort normal and breath sounds normal. No respiratory distress. She has no wheezes.   Abdominal: Soft. Bowel sounds are normal. She exhibits no distension. There is no tenderness.   Musculoskeletal: Normal range of motion. She exhibits no edema.   Hands with significant DJD changes, ulnar deviation of mid fingers noted at PIP joints   Lymphadenopathy:     She has no cervical adenopathy.   Neurological: She is alert and oriented to person, place, and time. No cranial nerve deficit. Coordination normal.   Psychiatric: She has a normal mood and affect. Her behavior is normal. Judgment and thought content normal.   Nursing note and vitals reviewed.      Assessment/Plan   Fanta Rodney is here today and the following problems have been addressed:      Fanta was seen today for follow-up.    Diagnoses and " all orders for this visit:    Other specified hypothyroidism    Gastroesophageal reflux disease with esophagitis    Vitamin D deficiency    Arthritis    Seasonal allergic rhinitis due to pollen    May stop ASA  Apply warm compress to right eye for 15 min and then massage area for 5 min, twice a day  May use KY jelly in nose for dryness  No medication changes today  Labs next visit  Take aleve with food for DJD  Continue zantac daily  Continue calcium and vit D    RTC 6 mo for med wellness with labs    Please note that portions of this note were completed with a voice recognition program.  Efforts were made to edit dictation, but occasionally words are mistranscribed.

## 2019-04-25 ENCOUNTER — TELEPHONE (OUTPATIENT)
Dept: INTERNAL MEDICINE | Facility: CLINIC | Age: 84
End: 2019-04-25

## 2019-07-29 RX ORDER — LEVOTHYROXINE SODIUM 0.1 MG/1
TABLET ORAL
Qty: 90 TABLET | Refills: 1 | Status: SHIPPED | OUTPATIENT
Start: 2019-07-29 | End: 2020-01-23

## 2019-09-09 ENCOUNTER — OFFICE VISIT (OUTPATIENT)
Dept: ORTHOPEDIC SURGERY | Facility: CLINIC | Age: 84
End: 2019-09-09

## 2019-09-09 VITALS — WEIGHT: 137 LBS | BODY MASS INDEX: 24.27 KG/M2 | RESPIRATION RATE: 18 BRPM | HEIGHT: 63 IN

## 2019-09-09 DIAGNOSIS — Z96.651 STATUS POST TOTAL RIGHT KNEE REPLACEMENT USING CEMENT: Primary | ICD-10-CM

## 2019-09-09 PROCEDURE — 73560 X-RAY EXAM OF KNEE 1 OR 2: CPT | Performed by: ORTHOPAEDIC SURGERY

## 2019-09-09 PROCEDURE — 99213 OFFICE O/P EST LOW 20 MIN: CPT | Performed by: ORTHOPAEDIC SURGERY

## 2019-09-09 NOTE — PROGRESS NOTES
Subjective   Patient ID: Fanta Rodney is a 83 y.o.  female is here today for orthopaedic evaluation of recovery progress with treatment.  Annual Exam and Follow-up of the Right Knee       CHIEF COMPLAINT: right knee    Annual visit after right knee joint replacement surgery    History of Present Illness     Progress Note:  Patient reports that with treatments and since the last visit, overall right knee pain is resolved, strength is full, and range of motion is full.  Patient is not currently taking pan medication for her right knee.  Since formal rehabilitation, physical therapy exercise has been performed at home. Patient states she walks with a friend about 3 times a week around her subdivision.  Patient has travelled to [place], [place] and [place] in the past few months to see family and friends and reports no limitations with her right knee.  Patient is retired and remains quite active. Patient does not  present with recent past labs, imaging or other studies for review today.      Past Medical History:   Diagnosis Date   • Anxiety    • Arthritis    • Bilateral cataracts     HISTORY OF HAS HAD REMOVED   • Body piercing     EARS ONLY   • Choledocholithiasis    • Disease of thyroid gland    • GERD (gastroesophageal reflux disease)    • History of esophagogastroduodenoscopy (EGD) 09/06/2013   • History of toe fracture 10/2012    Left middle toe   • Murmur    • Nausea with vomiting    • Osteoarthritis    • Stress fracture of metatarsal bone 06/02/2013    Left Foot - Treated by Dr. Manley   • Tear of meniscus of knee    • Urinary tract infection    • Wears glasses    • Wears partial dentures     LOWER ONLY        Past Surgical History:   Procedure Laterality Date   • ADENOIDECTOMY     • CATARACT EXTRACTION Right 08/31/2016    Dr. Michele Damian   • CATARACT EXTRACTION Left 09/29/2016    Dr. Michele Damian    • CHOLECYSTECTOMY  09/27/2013    Dr. Alexis Lobo   • COLONOSCOPY  02/08/2013    Dr. Alexis Gary   •  "DILATION AND CURETTAGE, DIAGNOSTIC / THERAPEUTIC  1967   • ENDOSCOPY     • FINGER SURGERY Right 08/17/2006    2 fingers - joint replacement - Dr. Suh - JAMIL Mukherjee   • THYROIDECTOMY  10/09/1967    JAMIL Pantoja - Diseased Thyroid (Goiter)   • TONSILLECTOMY     • TOTAL KNEE ARTHROPLASTY Right 7/24/2018    Procedure: Elective right total knee replacement (BIOMET);  Surgeon: Oscar Snow MD;  Location: [facility];  Service: Orthopedics        Family History   Problem Relation Age of Onset   • Cancer Mother    • Osteoarthritis Mother    • Cancer Father         Social History     Socioeconomic History   • Marital status:      Spouse name: Not on file   • Number of children: Not on file   • Years of education: Not on file   • Highest education level: Not on file   Tobacco Use   • Smoking status: Never Smoker   • Smokeless tobacco: Never Used   Substance and Sexual Activity   • Alcohol use: Yes     Comment: OCCASIONALLY   • Drug use: No   • Sexual activity: Defer       Allergies   Allergen Reactions   • Nexium [Esomeprazole Magnesium] GI Intolerance       Review of Systems   Constitutional: Negative for fever.   Eyes: Negative for visual disturbance.   Respiratory: Negative for shortness of breath.    Cardiovascular: Negative for chest pain.   Gastrointestinal: Negative for abdominal pain.   Musculoskeletal: Negative for arthralgias, gait problem and joint swelling.       I have reviewed the above medical and surgical history, family history, social history, medications, allergies and review of systems.    Objective   Resp 18   Ht 160 cm (63\")   Wt 62.1 kg (137 lb)   BMI 24.27 kg/m²     Physical Exam   Constitutional: She appears well-developed. No distress.   Musculoskeletal: She exhibits no deformity.        Right knee: She exhibits no effusion.   Neurological: She is alert. Gait normal.   Skin: Skin is warm and dry. No rash noted. No erythema.   Psychiatric: She has a normal mood " and affect. Her speech is normal.   Vitals reviewed.    Right Knee Exam     Muscle Strength   The patient has normal right knee strength.    Tenderness   The patient is experiencing no tenderness.     Range of Motion   Extension: 0   Right knee flexion: 125 degrees.     Tests   Varus: negative Valgus: negative  Patellar apprehension: negative    Other   Erythema: absent  Scars: present (healed pristine)  Sensation: normal  Pulse: present  Swelling: none  Effusion: no effusion present      Back Exam     Muscle Strength   Right Quadriceps:  5/5   Left Quadriceps:  5/5   Right Hamstrings:  5/5   Left Hamstrings:  5/5         Extremity DVT signs are Negative on physical exam with negative Rosa sign, with no calf pain, with no palpable cords, with no increased pain with passive stretch/extension and with no skin tone change   Neurologic Exam     Mental Status   Attention: normal.   Speech: speech is normal   Level of consciousness: alert  Knowledge: good.     Motor Exam   Overall muscle tone: normal    Strength   Right quadriceps: 5/5  Left quadriceps: 5/5  Right hamstrin/5  Left hamstrin/5    Gait, Coordination, and Reflexes     Gait  Gait: normal      Assessment/Plan   Independent Review of Radiographic Studies:    AP standing both knees and lateral right knee, indication to evaluate status of prosthesis and joint, and compared with prior imaging, shows no acute fracture or dislocation. Good position and alignment of prosthesis with no radiographic signs of loosening.  There is moderate lateral compartment osteoarthritis of the left knee without bone-on-bone wear and alignment relatively preserved.  Laboratory and Other Studies:  No new results reviewed today.   Medical Decision Making:    No complications of procedure and treatments.  Stable neurovascular exam.  Excellent progress almost 14 months after right total knee replacement.  May resume routine activity, work, sports and/or exercise as  tolerated.    Procedures    Fanta was seen today for annual exam and follow-up.    Diagnoses and all orders for this visit:    Status post total right knee replacement using cement  -     XR Knee 1 or 2 View Right       Discussion of orthopaedic goals and activities and patient and/or guardian expressed appreciation.  Regular exercise as tolerated  Guided on proper techniques for mobility, strength, agility and/or conditioning exercises  Physical therapy program ongoing  After care and dental prophylaxis for joint replacement prosthesis    Recommendations/Plan:  Exercise, medications, injections, other patient advice, and return appointment as noted.  Brace: No brace was given at today's visit  Referral: No referrals made at today's visit  Test/Studies: No additional studies ordered.  Work/Activity Status: May perform usual activities and routine exercise as tolerated.  No strenuous activity.      Return in about 1 year (around 9/9/2020) for Annual recheck, XOA right knee.  Patient is encouraged and agreeable to call or return sooner for any issues or concerns.          Portions of this note have been scribed for Oscar Snow MD by Debi Blancas CMA

## 2019-09-20 ENCOUNTER — TELEPHONE (OUTPATIENT)
Dept: INTERNAL MEDICINE | Facility: CLINIC | Age: 84
End: 2019-09-20

## 2019-09-20 NOTE — TELEPHONE ENCOUNTER
Received VM from Pt requesting return call.      Pt wanted to discuss the Zantac recall. She wanted to know if she can just stop medication cold turkey without any side effects.         Spoke with Dr. Vermeesch, she had me advise Pt it's okay to stop cold turkey.

## 2019-10-02 ENCOUNTER — FLU SHOT (OUTPATIENT)
Dept: INTERNAL MEDICINE | Facility: CLINIC | Age: 84
End: 2019-10-02

## 2019-10-02 DIAGNOSIS — Z23 IMMUNIZATION, PNEUMOCOCCUS AND INFLUENZA: ICD-10-CM

## 2019-10-02 PROCEDURE — 90653 IIV ADJUVANT VACCINE IM: CPT | Performed by: INTERNAL MEDICINE

## 2019-10-02 PROCEDURE — G0008 ADMIN INFLUENZA VIRUS VAC: HCPCS | Performed by: INTERNAL MEDICINE

## 2019-10-31 ENCOUNTER — OFFICE VISIT (OUTPATIENT)
Dept: INTERNAL MEDICINE | Facility: CLINIC | Age: 84
End: 2019-10-31

## 2019-10-31 VITALS
SYSTOLIC BLOOD PRESSURE: 122 MMHG | OXYGEN SATURATION: 95 % | HEIGHT: 63 IN | WEIGHT: 138 LBS | TEMPERATURE: 97.8 F | BODY MASS INDEX: 24.45 KG/M2 | DIASTOLIC BLOOD PRESSURE: 66 MMHG | HEART RATE: 94 BPM

## 2019-10-31 DIAGNOSIS — E03.8 OTHER SPECIFIED HYPOTHYROIDISM: ICD-10-CM

## 2019-10-31 DIAGNOSIS — M19.90 ARTHRITIS: ICD-10-CM

## 2019-10-31 DIAGNOSIS — K21.00 GASTROESOPHAGEAL REFLUX DISEASE WITH ESOPHAGITIS: ICD-10-CM

## 2019-10-31 DIAGNOSIS — Z00.00 ENCOUNTER FOR MEDICARE ANNUAL WELLNESS EXAM: Primary | ICD-10-CM

## 2019-10-31 DIAGNOSIS — E55.9 VITAMIN D DEFICIENCY: ICD-10-CM

## 2019-10-31 PROCEDURE — G0009 ADMIN PNEUMOCOCCAL VACCINE: HCPCS | Performed by: INTERNAL MEDICINE

## 2019-10-31 PROCEDURE — 90670 PCV13 VACCINE IM: CPT | Performed by: INTERNAL MEDICINE

## 2019-10-31 PROCEDURE — G0439 PPPS, SUBSEQ VISIT: HCPCS | Performed by: INTERNAL MEDICINE

## 2019-10-31 NOTE — PROGRESS NOTES
"The ABCs of the Annual Wellness Visit  Subsequent Medicare Wellness Visit    Chief Complaint   Patient presents with   • Medicare Wellness-subsequent       Subjective   History of Present Illness:  Fanta Rodney is a 84 y.o. female who presents for a Subsequent Medicare Wellness Visit.  PMH of hypothyroid, allergies, GERD, vit D def and DJD. She is having some pain in her ankles and feet from DJD.  She has some swelling in her ankles.  She takes turmeric and aleve daily after breakfast.  She takes her thryoid med before she eats.  She is not having any problems with GERD, she stopped her zantac, uses TUMS prn.  She does not need daily meds for allergies, uses saline for dryness as needed at this time.  She does her knee exercises about 5 days a week.  When she was 10 yrs old she had \"yellow jaundice\" and was very sick for a few weeks, had yellow eyes, NVD.     HEALTH RISK ASSESSMENT    Recent Hospitalizations:  No hospitalization(s) within the last year.    Current Medical Providers:  Patient Care Team:  Vermeesch, Marilyn K, MD as PCP - General  Pa Faust OD as PCP - Claims Attributed    Smoking Status:  Social History     Tobacco Use   Smoking Status Never Smoker   Smokeless Tobacco Never Used       Alcohol Consumption:  Social History     Substance and Sexual Activity   Alcohol Use Yes    Comment: OCCASIONALLY       Depression Screen:   PHQ-2/PHQ-9 Depression Screening 10/31/2019   Little interest or pleasure in doing things 0   Feeling down, depressed, or hopeless 0   Total Score 0       Fall Risk Screen:  NABEELADI Fall Risk Assessment was completed, and patient is at LOW risk for falls.Assessment completed on:10/31/2019    Health Habits and Functional and Cognitive Screening:  Functional & Cognitive Status 10/31/2019   Do you have difficulty preparing food and eating? No   Do you have difficulty bathing yourself, getting dressed or grooming yourself? No   Do you have difficulty using the toilet? No "   Do you have difficulty moving around from place to place? No   Do you have trouble with steps or getting out of a bed or a chair? No   Current Diet Limited Junk Food   Dental Exam Up to date   Eye Exam Up to date   Exercise (times per week) 5 times per week   Do you need help using the phone?  No   Are you deaf or do you have serious difficulty hearing?  No   Do you need help with transportation? No   Do you need help shopping? No   Do you need help preparing meals?  No   Do you need help with housework?  No   Do you need help with laundry? No   Do you need help taking your medications? No   Do you need help managing money? No   Do you ever drive or ride in a car without wearing a seat belt? No   Have you felt unusual stress, anger or loneliness in the last month? No   Who do you live with? Alone   If you need help, do you have trouble finding someone available to you? No   Do you have difficulty concentrating, remembering or making decisions? No         Does the patient have evidence of cognitive impairment? No    Asprin use counseling:Does not need ASA (and currently is not on it)    Age-appropriate Screening Schedule:  Refer to the list below for future screening recommendations based on patient's age, sex and/or medical conditions. Orders for these recommended tests are listed in the plan section. The patient has been provided with a written plan.    Health Maintenance   Topic Date Due   • MAMMOGRAM  06/08/2016   • INFLUENZA VACCINE  Completed   • PNEUMOCOCCAL VACCINES (65+ LOW/MEDIUM RISK)  Completed   • ZOSTER VACCINE  Completed   • TDAP/TD VACCINES  Discontinued          The following portions of the patient's history were reviewed and updated as appropriate: allergies, current medications, past family history, past medical history, past social history, past surgical history and problem list.    Outpatient Medications Prior to Visit   Medication Sig Dispense Refill   • Ascorbic Acid (VITAMIN C) 500 MG  "capsule Take  by mouth daily.     • Calcium Carb-Cholecalciferol (CALCIUM + D3) 600-200 MG-UNIT tablet Take  by mouth daily.     • Cholecalciferol (VITAMIN D-3) 1000 UNITS capsule Take  by mouth daily.     • levothyroxine (SYNTHROID, LEVOTHROID) 100 MCG tablet TAKE 1 TABLET BY MOUTH  DAILY 90 tablet 1   • MULTIPLE VITAMINS PO Take  by mouth daily.     • Naproxen Sodium (ALEVE) 220 MG capsule      • Turmeric 450 MG capsule Take 1,050 mg by mouth Daily.       No facility-administered medications prior to visit.        Patient Active Problem List   Diagnosis   • Gastroesophageal reflux disease with esophagitis   • Hypothyroidism   • Vitamin D deficiency   • Arthritis   • Seasonal allergic rhinitis due to pollen   • Primary osteoarthritis of right knee   • Encounter for Medicare annual wellness exam       Advanced Care Planning:  Patient has an advance directive - a copy has been provided and is visible in patient header    Review of Systems   Constitutional: Negative.    HENT: Negative.    Eyes: Negative.    Respiratory: Negative.    Cardiovascular: Positive for leg swelling.   Gastrointestinal: Negative.    Endocrine: Negative.    Genitourinary: Negative.    Musculoskeletal: Positive for arthralgias.   Skin: Negative.    Allergic/Immunologic: Negative.    Neurological: Negative.    Hematological: Bruises/bleeds easily.   Psychiatric/Behavioral: Negative.        Compared to one year ago, the patient feels her physical health is the same.  Compared to one year ago, the patient feels her mental health is the same.    Reviewed chart for potential of high risk medication in the elderly: yes  Reviewed chart for potential of harmful drug interactions in the elderly:yes    Objective         Vitals:    10/31/19 1256   BP: 122/66   Pulse: 94   Temp: 97.8 °F (36.6 °C)   SpO2: 95%   Weight: 62.6 kg (138 lb)   Height: 160 cm (63\")   PainSc:   5       Body mass index is 24.45 kg/m².  Discussed the patient's BMI with her. The BMI is " in the acceptable range.    Physical Exam   Constitutional: She is oriented to person, place, and time. She appears well-developed and well-nourished. No distress.   Pleasant female, appears stated age, NAD today   HENT:   Head: Normocephalic and atraumatic.   Right Ear: External ear normal.   Left Ear: External ear normal.   Mouth/Throat: Oropharynx is clear and moist.   TMs normal   Eyes: Conjunctivae and EOM are normal. Pupils are equal, round, and reactive to light.   Neck: Normal range of motion. Neck supple. No thyromegaly present.   Cardiovascular: Normal rate, regular rhythm and intact distal pulses.   Murmur heard.  No carotid bruits  SM 2/6 over precordium, loudest at LSB   Pulmonary/Chest: Effort normal and breath sounds normal. No respiratory distress. She has no wheezes.   Abdominal: Soft. Bowel sounds are normal. She exhibits no distension. There is no tenderness.   Musculoskeletal: Normal range of motion. She exhibits no edema.   Fingers with DJD changes over DIP and PIP joints  Toes show hammer changes over second toes bilaterally   Lymphadenopathy:     She has no cervical adenopathy.   Neurological: She is alert and oriented to person, place, and time. She displays normal reflexes. No cranial nerve deficit. Coordination normal.   Decreased patella reflex in right knee   Psychiatric: She has a normal mood and affect. Her behavior is normal. Judgment and thought content normal.   Nursing note and vitals reviewed.            Assessment/Plan   Medicare Risks and Personalized Health Plan  CMS Preventative Services Quick Reference  Chronic Pain   Diabetic Lab Screening   Immunizations Discussed/Encouraged (specific immunizations; Prevnar )    The above risks/problems have been discussed with the patient.  Pertinent information has been shared with the patient in the After Visit Summary.  Follow up plans and orders are seen below in the Assessment/Plan Section.    Diagnoses and all orders for this  visit:    1. Encounter for Medicare annual wellness exam (Primary)  -     CBC & Differential  -     Comprehensive Metabolic Panel  -     T4, Free  -     TSH  -     Vitamin D 25 Hydroxy  -     Lipid Panel With / Chol / HDL Ratio    2. Gastroesophageal reflux disease with esophagitis  -     CBC & Differential  -     Comprehensive Metabolic Panel    3. Vitamin D deficiency  -     Vitamin D 25 Hydroxy    4. Other specified hypothyroidism  -     T4, Free  -     TSH  -     Lipid Panel With / Chol / HDL Ratio    5. Arthritis  -     CBC & Differential    labs due when fasting  prevar vaccine today  Continue calcium with vit D  Continue turmeric and aleve with food for DJD  Take TUMS prn for GERD, minimal sxs  Recommend HH diet and increase upper body exercises, she does regular lower body exercises 5 days a week      Follow Up:  Return in about 6 months (around 4/30/2020) for Next scheduled follow up.     An After Visit Summary and PPPS were given to the patient.

## 2019-11-05 LAB
25(OH)D3+25(OH)D2 SERPL-MCNC: 68 NG/ML (ref 30–100)
ALBUMIN SERPL-MCNC: 4.4 G/DL (ref 3.5–5.2)
ALBUMIN/GLOB SERPL: 1.6 G/DL
ALP SERPL-CCNC: 65 U/L (ref 39–117)
ALT SERPL-CCNC: 15 U/L (ref 1–33)
AST SERPL-CCNC: 20 U/L (ref 1–32)
BASOPHILS # BLD AUTO: 0.08 10*3/MM3 (ref 0–0.2)
BASOPHILS NFR BLD AUTO: 1.1 % (ref 0–1.5)
BILIRUB SERPL-MCNC: 0.6 MG/DL (ref 0.2–1.2)
BUN SERPL-MCNC: 18 MG/DL (ref 8–23)
BUN/CREAT SERPL: 19.6 (ref 7–25)
CALCIUM SERPL-MCNC: 9.8 MG/DL (ref 8.6–10.5)
CHLORIDE SERPL-SCNC: 103 MMOL/L (ref 98–107)
CHOLEST SERPL-MCNC: 182 MG/DL (ref 0–200)
CHOLEST/HDLC SERPL: 3.64 {RATIO}
CO2 SERPL-SCNC: 26.2 MMOL/L (ref 22–29)
CREAT SERPL-MCNC: 0.92 MG/DL (ref 0.57–1)
EOSINOPHIL # BLD AUTO: 0.44 10*3/MM3 (ref 0–0.4)
EOSINOPHIL NFR BLD AUTO: 6.3 % (ref 0.3–6.2)
ERYTHROCYTE [DISTWIDTH] IN BLOOD BY AUTOMATED COUNT: 13.3 % (ref 12.3–15.4)
GLOBULIN SER CALC-MCNC: 2.8 GM/DL
GLUCOSE SERPL-MCNC: 87 MG/DL (ref 65–99)
HCT VFR BLD AUTO: 44.2 % (ref 34–46.6)
HDLC SERPL-MCNC: 50 MG/DL (ref 40–60)
HGB BLD-MCNC: 14.7 G/DL (ref 12–15.9)
IMM GRANULOCYTES # BLD AUTO: 0.01 10*3/MM3 (ref 0–0.05)
IMM GRANULOCYTES NFR BLD AUTO: 0.1 % (ref 0–0.5)
LDLC SERPL CALC-MCNC: 105 MG/DL (ref 0–100)
LYMPHOCYTES # BLD AUTO: 1.97 10*3/MM3 (ref 0.7–3.1)
LYMPHOCYTES NFR BLD AUTO: 28.3 % (ref 19.6–45.3)
MCH RBC QN AUTO: 31.3 PG (ref 26.6–33)
MCHC RBC AUTO-ENTMCNC: 33.3 G/DL (ref 31.5–35.7)
MCV RBC AUTO: 94 FL (ref 79–97)
MONOCYTES # BLD AUTO: 0.72 10*3/MM3 (ref 0.1–0.9)
MONOCYTES NFR BLD AUTO: 10.3 % (ref 5–12)
NEUTROPHILS # BLD AUTO: 3.74 10*3/MM3 (ref 1.7–7)
NEUTROPHILS NFR BLD AUTO: 53.9 % (ref 42.7–76)
NRBC BLD AUTO-RTO: 0 /100 WBC (ref 0–0.2)
PLATELET # BLD AUTO: 246 10*3/MM3 (ref 140–450)
POTASSIUM SERPL-SCNC: 4.6 MMOL/L (ref 3.5–5.2)
PROT SERPL-MCNC: 7.2 G/DL (ref 6–8.5)
RBC # BLD AUTO: 4.7 10*6/MM3 (ref 3.77–5.28)
SODIUM SERPL-SCNC: 141 MMOL/L (ref 136–145)
T4 FREE SERPL-MCNC: 1.33 NG/DL (ref 0.93–1.7)
TRIGL SERPL-MCNC: 136 MG/DL (ref 0–150)
TSH SERPL DL<=0.005 MIU/L-ACNC: 4.74 UIU/ML (ref 0.27–4.2)
VLDLC SERPL CALC-MCNC: 27.2 MG/DL
WBC # BLD AUTO: 6.96 10*3/MM3 (ref 3.4–10.8)

## 2020-01-23 RX ORDER — LEVOTHYROXINE SODIUM 0.1 MG/1
TABLET ORAL
Qty: 90 TABLET | Refills: 1 | Status: SHIPPED | OUTPATIENT
Start: 2020-01-23 | End: 2020-06-08

## 2020-03-04 ENCOUNTER — HOSPITAL ENCOUNTER (OUTPATIENT)
Dept: GENERAL RADIOLOGY | Facility: HOSPITAL | Age: 85
Discharge: HOME | End: 2020-03-04
Admitting: NURSE PRACTITIONER

## 2020-03-04 ENCOUNTER — OFFICE VISIT (OUTPATIENT)
Dept: INTERNAL MEDICINE | Facility: CLINIC | Age: 85
End: 2020-03-04

## 2020-03-04 VITALS
WEIGHT: 137 LBS | HEIGHT: 63 IN | SYSTOLIC BLOOD PRESSURE: 118 MMHG | OXYGEN SATURATION: 94 % | TEMPERATURE: 97.9 F | HEART RATE: 52 BPM | BODY MASS INDEX: 24.27 KG/M2 | DIASTOLIC BLOOD PRESSURE: 64 MMHG

## 2020-03-04 DIAGNOSIS — M79.672 PAIN OF LEFT MIDFOOT: ICD-10-CM

## 2020-03-04 DIAGNOSIS — M25.531 RIGHT WRIST PAIN: ICD-10-CM

## 2020-03-04 DIAGNOSIS — M25.572 ACUTE LEFT ANKLE PAIN: ICD-10-CM

## 2020-03-04 DIAGNOSIS — M19.90 ARTHRITIS: Primary | ICD-10-CM

## 2020-03-04 PROCEDURE — 73630 X-RAY EXAM OF FOOT: CPT

## 2020-03-04 PROCEDURE — 99214 OFFICE O/P EST MOD 30 MIN: CPT | Performed by: NURSE PRACTITIONER

## 2020-03-04 PROCEDURE — 73610 X-RAY EXAM OF ANKLE: CPT

## 2020-03-04 RX ORDER — ASPIRIN 81 MG/1
81 TABLET, CHEWABLE ORAL DAILY
COMMUNITY
End: 2020-09-14

## 2020-03-04 RX ORDER — RANITIDINE 150 MG/1
150 TABLET ORAL 2 TIMES DAILY
COMMUNITY
End: 2020-04-29

## 2020-03-04 RX ORDER — COVID-19 ANTIGEN TEST
220 KIT MISCELLANEOUS 2 TIMES DAILY PRN
Qty: 60 EACH | Refills: 0
Start: 2020-03-04

## 2020-03-04 NOTE — PROGRESS NOTES
"Date: 2020    Name: Fanta Rodney  : 1935    Chief Complaint:   Chief Complaint   Patient presents with   • Foot Pain     left foot and right hand       HPI:  Fanta Rodney is a 84 y.o. female presents with foot and ankle pain for about a week.  She has been going to [employer] with [employer], taking part in floor exercises since January. She particularly noted pain after doing exercises during which she was asked to stomp repeatedly.  She has also noted pain in her right wrist; has been doing exercises with hand weights for the past 2 months.  She has a significant h/o arthritis.  Does not recall injury to either left foot or right wrist.  Describes pain as aching.  Swelling noted in wrist and foot.  Pain in left foot rated as 3/10, right wrist as 1/10.  Denies numbness, tingling, burning pain in any extremity.  Has not required assistive device for ambulation.  Normally takes one aleve daily, with food, as well as turmeric supplement; has not taken more medication for symptoms.        History:  The following portions of the patient's history were reviewed and updated as appropriate: allergies, current medications, past medical history, family history, surgical history, social history and problem list.     ROS:  Review of Systems   Constitutional: Negative.    Respiratory: Negative.    Cardiovascular: Negative.    Neurological: Negative.    Hematological: Does not bruise/bleed easily.       VS:  Vitals:    20 0917   BP: 118/64   Pulse: 52   Temp: 97.9 °F (36.6 °C)   TempSrc: Temporal   SpO2: 94%   Weight: 62.1 kg (137 lb)   Height: 160 cm (63\")     Body mass index is 24.27 kg/m².    PE:  Physical Exam   Constitutional: She is oriented to person, place, and time. She appears well-developed and well-nourished. No distress.   HENT:   Head: Normocephalic.   Mouth/Throat: Oropharynx is clear and moist.   Eyes: Pupils are equal, round, and reactive to light. Conjunctivae are normal.   Cardiovascular: " Normal rate, regular rhythm, normal heart sounds and intact distal pulses.   Pulmonary/Chest: Effort normal and breath sounds normal.   Musculoskeletal: She exhibits deformity.   Left midfoot & ankle very tender to touch, slightly edematous.  Right wrist tender to touch; no edema noted.  Arthritic changes of joints noted in all fingers, toes, wrists, ankles, knees.     Neurological: She is alert and oriented to person, place, and time.   Skin: Skin is warm. Capillary refill takes less than 2 seconds.       Assessment/Plan:  Fanta was seen today for foot pain.    Diagnoses and all orders for this visit:    Arthritis  -     Naproxen Sodium (ALEVE) 220 MG capsule; Take 220 mg by mouth 2 (Two) Times a Day As Needed (pain).    Pain of left midfoot  -     XR foot 3+ vw left  -     Naproxen Sodium (ALEVE) 220 MG capsule; Take 220 mg by mouth 2 (Two) Times a Day As Needed (pain).    Acute left ankle pain  -     XR ankle 3+ vw left  -     Naproxen Sodium (ALEVE) 220 MG capsule; Take 220 mg by mouth 2 (Two) Times a Day As Needed (pain).    Right wrist pain  -     Naproxen Sodium (ALEVE) 220 MG capsule; Take 220 mg by mouth 2 (Two) Times a Day As Needed (pain).    Advised to stop high intensity workout.  May try recumbent bike, water aerobics, seated yoga.  Stop using hand weights, may continue upper body workout without weights.  Apply heat to joints, prn pain.  Take aleve twice daily for the next 7-10 days, with food.  Monitor for s/s bleeding; stop taking, return to clinic should s/s be noted.      Return for Next scheduled follow up.

## 2020-04-29 NOTE — PROGRESS NOTES
Chief Complaint   Patient presents with   • Follow-up     for GERD and Hypothyroidism.      Subjective   Fanta Rodney is a 84 y.o. female.     Telemedicine phone visit today for follow up of hypothyroid, allergies, GERD, DJD, vit D def.  hypothyroid- she is compliant with medication on empty stomach before other meds/food.  No difficulty swallowing, change in voice, constipation.  She has chronic thinning of hair  Allergies- have not been bad this spring so far, used claritin only one time.  She did have right jowl swelling/tingling with associated N/V, congestion a few weeks ago, she took a dose of benadryl and felt better the next day....  GERD- currently has no symptoms.  She uses TUMS if needed  Vit D def- she take calcium supplement with 800 u of vit D and vit  u a day  DJD- currently on one aleve and turmeric also for DJD with good control of sxs.  She also exercises every morning on awakening with stretches  HCM- no longer needs mammogram or colonoscopy.  All vaccines are UTD, had Hep A as a child  No current depression or anxiety during Corona virus pandemic. She has not been staying home, has been visiting her daughter and family and her sister.  She states that they all stay home except to go to grocery or  take out.        The following portions of the patient's history were reviewed and updated as appropriate: allergies, current medications, past family history, past medical history, past social history, past surgical history and problem list.    Review of Systems   Constitutional: Negative for activity change, appetite change and unexpected weight change.   HENT: Negative for congestion, trouble swallowing and voice change.    Respiratory: Negative for shortness of breath.    Cardiovascular: Negative for chest pain and palpitations.   Gastrointestinal: Negative for constipation.   Endocrine: Negative for cold intolerance and heat intolerance.   Musculoskeletal:        DJD currently well  controlled   Allergic/Immunologic:        See HPI   Psychiatric/Behavioral: Negative for dysphoric mood and sleep disturbance. The patient is not nervous/anxious.        Objective   /73   Pulse 90   Temp 98.6 °F (37 °C)   There is no height or weight on file to calculate BMI.  Physical Exam   Neurological: She is alert.   Vitals reviewed.  No physical exam today, phone visit only    Assessment/Plan   Fanta Rodney is here today and the following problems have been addressed:      Fanta was seen today for follow-up.    Diagnoses and all orders for this visit:    Other specified hypothyroidism    Arthritis    Seasonal allergic rhinitis due to pollen    Gastroesophageal reflux disease with esophagitis    Vitamin D deficiency    she is compliant with her thyroid medication, no symptoms of hypothyroidism  Continue aleve and turmeric for DJD  Maintain healthy diet and exercise, as this is helping DJD and GERD sxs  Her allergies are not bothersome, use claritin prn  GERD not currently bothersome, take TUMS prn  Continue calcium and vit D    RTC 4 mo for routine follow up with labs    This visit has been rescheduled as a phone visit to comply with patient safety concerns in accordance with CDC recommendations. Total time of discussion was 18 minutes.        Please note that portions of this note were completed with a voice recognition program.  Efforts were made to edit dictation, but occasionally words are mistranscribed.You have chosen to receive care through a telephone visit. Do you consent to use a telephone visit for your medical care today? Yes    Active Parties: Marilyn Vermeesch (PCP), Gertrudis Mata (CMA), and (Pt) Fanta Rodney.

## 2020-04-30 ENCOUNTER — OFFICE VISIT (OUTPATIENT)
Dept: INTERNAL MEDICINE | Facility: CLINIC | Age: 85
End: 2020-04-30

## 2020-04-30 VITALS — HEART RATE: 90 BPM | DIASTOLIC BLOOD PRESSURE: 73 MMHG | SYSTOLIC BLOOD PRESSURE: 111 MMHG | TEMPERATURE: 98.6 F

## 2020-04-30 DIAGNOSIS — K21.00 GASTROESOPHAGEAL REFLUX DISEASE WITH ESOPHAGITIS: ICD-10-CM

## 2020-04-30 DIAGNOSIS — E55.9 VITAMIN D DEFICIENCY: ICD-10-CM

## 2020-04-30 DIAGNOSIS — E03.8 OTHER SPECIFIED HYPOTHYROIDISM: Primary | ICD-10-CM

## 2020-04-30 DIAGNOSIS — J30.1 SEASONAL ALLERGIC RHINITIS DUE TO POLLEN: ICD-10-CM

## 2020-04-30 DIAGNOSIS — M19.90 ARTHRITIS: ICD-10-CM

## 2020-04-30 PROCEDURE — G2025 DIS SITE TELE SVCS RHC/FQHC: HCPCS | Performed by: INTERNAL MEDICINE

## 2020-06-08 RX ORDER — LEVOTHYROXINE SODIUM 0.1 MG/1
TABLET ORAL
Qty: 90 TABLET | Refills: 1 | Status: SHIPPED | OUTPATIENT
Start: 2020-06-08 | End: 2020-12-07

## 2020-09-14 ENCOUNTER — OFFICE VISIT (OUTPATIENT)
Dept: ORTHOPEDIC SURGERY | Facility: CLINIC | Age: 85
End: 2020-09-14

## 2020-09-14 VITALS — BODY MASS INDEX: 24.2 KG/M2 | WEIGHT: 136.6 LBS | RESPIRATION RATE: 16 BRPM | HEIGHT: 63 IN

## 2020-09-14 DIAGNOSIS — Z96.651 STATUS POST TOTAL RIGHT KNEE REPLACEMENT USING CEMENT: Primary | ICD-10-CM

## 2020-09-14 PROCEDURE — 73560 X-RAY EXAM OF KNEE 1 OR 2: CPT | Performed by: ORTHOPAEDIC SURGERY

## 2020-09-14 PROCEDURE — 99213 OFFICE O/P EST LOW 20 MIN: CPT | Performed by: ORTHOPAEDIC SURGERY

## 2020-09-14 NOTE — PROGRESS NOTES
Subjective   Patient ID: Fanta Rodney is a 84 y.o.  female is here today for orthopaedic evaluation of recovery progress with treatment.  Follow-up of the Right Knee         CHIEF COMPLAINT: right knee annual check    Annual visit after right total knee joint replacement surgery    History of Present Illness     Progress Note:  Patient reports that with treatments and since the last visit, overall pain is resolved, strength and range of motion is good.  Patient is currently taking Aleve daily for generalized arthritis pain.   Physical therapy is done at home. She does leg lifts, stretching and flexion and extension exercises.  She expresses continued satisfaction with her functional right total knee replacement.    Past Medical History:   Diagnosis Date   • Anxiety    • Arthritis    • Bilateral cataracts     HISTORY OF HAS HAD REMOVED   • Body piercing     EARS ONLY   • Choledocholithiasis    • Disease of thyroid gland    • GERD (gastroesophageal reflux disease)    • History of esophagogastroduodenoscopy (EGD) 09/06/2013   • History of toe fracture 10/2012    Left middle toe   • Murmur    • Nausea with vomiting    • Osteoarthritis    • Stress fracture of metatarsal bone 06/02/2013    Left Foot - Treated by Dr. Manley   • Tear of meniscus of knee    • Urinary tract infection    • Wears glasses    • Wears partial dentures     LOWER ONLY        Past Surgical History:   Procedure Laterality Date   • ADENOIDECTOMY     • CATARACT EXTRACTION Right 08/31/2016    Dr. Michele Damian   • CATARACT EXTRACTION Left 09/29/2016    Dr. Michele Damian    • CHOLECYSTECTOMY  09/27/2013    Dr. Alexis Lobo   • COLONOSCOPY  02/08/2013    Dr. Alexis Gary   • DILATION AND CURETTAGE, DIAGNOSTIC / THERAPEUTIC  1967   • ENDOSCOPY     • FINGER SURGERY Right 08/17/2006    2 fingers - joint replacement - Dr. Suh - JAMIL Mukherjee   • THYROIDECTOMY  10/09/1967    JAMIL Pantoja - Diseased Thyroid (Goiter)   • TONSILLECTOMY     •  "TOTAL KNEE ARTHROPLASTY Right 7/24/2018    Procedure: Elective right total knee replacement (BIOMET);  Surgeon: Oscar Snow MD;  Location: [facility];  Service: Orthopedics        Family History   Problem Relation Age of Onset   • Cancer Mother    • Osteoarthritis Mother    • Cancer Father         Social History     Socioeconomic History   • Marital status:      Spouse name: Not on file   • Number of children: Not on file   • Years of education: Not on file   • Highest education level: Not on file   Tobacco Use   • Smoking status: Never Smoker   • Smokeless tobacco: Never Used   Substance and Sexual Activity   • Alcohol use: Yes     Comment: OCCASIONALLY   • Drug use: No   • Sexual activity: Defer       Allergies   Allergen Reactions   • Nexium [Esomeprazole Magnesium] GI Intolerance       Review of Systems   Constitutional: Negative for fever.   Musculoskeletal: Positive for arthralgias (chronic, generalized). Negative for gait problem and joint swelling.       I have reviewed the medical and surgical history, family history, social history, medications, and/or allergies, and the review of systems of this report.    Objective   Resp 16   Ht 160 cm (63\")   Wt 62 kg (136 lb 9.6 oz)   BMI 24.20 kg/m²     Physical Exam  Vitals signs reviewed.   Constitutional:       General: She is not in acute distress.     Appearance: She is well-developed.   Musculoskeletal:      Right knee: She exhibits no effusion.   Skin:     General: Skin is warm and dry.      Findings: No erythema or rash.   Neurological:      Mental Status: She is alert.      Gait: Gait is intact.   Psychiatric:         Speech: Speech normal.       Right Knee Exam     Muscle Strength   The patient has normal right knee strength.    Tenderness   The patient is experiencing no tenderness.     Range of Motion   Extension: 0   Flexion: 120     Tests   Varus: negative Valgus: negative    Other   Erythema: absent  Scars: present (well healed)  Pulse: " present  Swelling: none  Effusion: no effusion present        Extremity DVT signs are negative on physical exam with negative Rosa sign, no calf pain, no palpable cords and no skin tone change   Neurologic Exam     Mental Status   Attention: normal.   Speech: speech is normal   Level of consciousness: alert  Knowledge: good.     Motor Exam   Overall muscle tone: normal    Gait, Coordination, and Reflexes     Gait  Gait: normal      Assessment/Plan     Independent Review of Radiographic Studies:    AP standing of both knees and lateral of the right knee, indication to evaluate status of prosthesis and joint, and compared with prior imaging, shows no acute fracture or dislocation. Good position and alignment of prosthesis with no radiographic signs of loosening.     Laboratory and Other Studies:  No new results reviewed today.     Medical Decision Making:    No complications of procedure and treatments.  Excellent progress.  Routine activity and exercise as tolerated.    Arpit Jewell was seen today for follow-up.    Diagnoses and all orders for this visit:    Status post total right knee replacement using cement  -     XR Knee 1 or 2 View Right; Future       Discussion of orthopaedic goals and activities and patient and/or guardian expressed appreciation.  Regular exercise as tolerated  Guided on proper techniques for mobility, strength, agility and/or conditioning exercises  After care and dental prophylaxis for joint replacement prosthesis    Recommendations/Plan:  Exercise, medications, injections, other patient advice, and return appointment as noted.  Brace: No brace was given at today's visit.  Referral: No referrals made at today's visit.  Test/Studies: No additional studies ordered at this time.  Work/Activity Status: Usual activities, routine exercise as tolerated, no strenuous activity.    Return in about 2 years (around 9/14/2022) for Annual recheck, XOA right knee.  Patient is encouraged and  agreeable to call or return sooner for any issues or concerns.      Portions of this note have been scribed for Oscar Snow MD by Debi Blancas CMA

## 2020-09-16 ENCOUNTER — TELEPHONE (OUTPATIENT)
Dept: INTERNAL MEDICINE | Facility: CLINIC | Age: 85
End: 2020-09-16

## 2020-10-07 ENCOUNTER — FLU SHOT (OUTPATIENT)
Dept: INTERNAL MEDICINE | Facility: CLINIC | Age: 85
End: 2020-10-07

## 2020-10-07 DIAGNOSIS — Z23 NEED FOR INFLUENZA VACCINATION: ICD-10-CM

## 2020-10-07 PROCEDURE — G0008 ADMIN INFLUENZA VIRUS VAC: HCPCS | Performed by: INTERNAL MEDICINE

## 2020-10-07 PROCEDURE — 90694 VACC AIIV4 NO PRSRV 0.5ML IM: CPT | Performed by: INTERNAL MEDICINE

## 2020-11-09 ENCOUNTER — OFFICE VISIT (OUTPATIENT)
Dept: INTERNAL MEDICINE | Facility: CLINIC | Age: 85
End: 2020-11-09

## 2020-11-09 VITALS
OXYGEN SATURATION: 96 % | WEIGHT: 138 LBS | TEMPERATURE: 97 F | HEIGHT: 63 IN | BODY MASS INDEX: 24.45 KG/M2 | HEART RATE: 93 BPM | SYSTOLIC BLOOD PRESSURE: 120 MMHG | DIASTOLIC BLOOD PRESSURE: 74 MMHG

## 2020-11-09 DIAGNOSIS — M19.90 ARTHRITIS: ICD-10-CM

## 2020-11-09 DIAGNOSIS — E03.8 OTHER SPECIFIED HYPOTHYROIDISM: ICD-10-CM

## 2020-11-09 DIAGNOSIS — Z00.00 ENCOUNTER FOR MEDICARE ANNUAL WELLNESS EXAM: Primary | ICD-10-CM

## 2020-11-09 DIAGNOSIS — E55.9 VITAMIN D DEFICIENCY: ICD-10-CM

## 2020-11-09 DIAGNOSIS — K21.00 GASTROESOPHAGEAL REFLUX DISEASE WITH ESOPHAGITIS WITHOUT HEMORRHAGE: ICD-10-CM

## 2020-11-09 DIAGNOSIS — J30.1 SEASONAL ALLERGIC RHINITIS DUE TO POLLEN: ICD-10-CM

## 2020-11-09 PROBLEM — M17.11 PRIMARY OSTEOARTHRITIS OF RIGHT KNEE: Status: RESOLVED | Noted: 2018-06-28 | Resolved: 2020-11-09

## 2020-11-09 PROCEDURE — G0439 PPPS, SUBSEQ VISIT: HCPCS | Performed by: INTERNAL MEDICINE

## 2020-11-09 RX ORDER — DIPHENOXYLATE HYDROCHLORIDE AND ATROPINE SULFATE 2.5; .025 MG/1; MG/1
TABLET ORAL DAILY
COMMUNITY
End: 2021-05-10

## 2020-11-09 NOTE — PROGRESS NOTES
The ABCs of the Annual Wellness Visit  Subsequent Medicare Wellness Visit    Chief Complaint   Patient presents with   • Medicare Wellness-subsequent     Pt would like to discuss her vitamins and would like left ear lobe looked at.        Subjective   History of Present Illness:  Fanta Rodney is a 85 y.o. female who presents for a Subsequent Medicare Wellness Visit.  PMH of hypothyroid, allergies, vitamin D deficiency, GERD and osteoarthritis.  She is compliant with thyroid medication, takes this before all other medications and food every day.  She has been taking Aleve for her right knee pain due to osteoarthritis.  She followed up with orthopedic surgeon after her total knee replacement 1 year ago.  She remains on vitamin D 1000 units daily due to history of vitamin D deficiency.  Her vitamin D level was 68 last year.  She is on vit C 500 mg 3 times a week.  She also takes turmeric once a day for her DJD and cholesterol.  Her lipid panel was good last yr.  She denies any GERD unless she eats something spicy, rarely requires a TUMS.  No current allergies, she does not need medication.  She says she cannot get an ear ring in her left ear.  She took ear rings out in the summer and then she noted a lump in lobe, tried to force her ear ring thru. She went a few months with no ear ring.      HEALTH RISK ASSESSMENT    Recent Hospitalizations:  No hospitalization(s) within the last year.    Current Medical Providers:  Patient Care Team:  Vermeesch, Marilyn K, MD as PCP - General    Smoking Status:  Social History     Tobacco Use   Smoking Status Never Smoker   Smokeless Tobacco Never Used       Alcohol Consumption:  Social History     Substance and Sexual Activity   Alcohol Use Yes    Comment: OCCASIONALLY       Depression Screen:   PHQ-2/PHQ-9 Depression Screening 11/9/2020   Little interest or pleasure in doing things 0   Feeling down, depressed, or hopeless 0   Total Score 0       Fall Risk Screen:  KENYA Mauricio  Risk Assessment was completed, and patient is at LOW risk for falls.Assessment completed on:11/9/2020    Health Habits and Functional and Cognitive Screening:  Functional & Cognitive Status 11/9/2020   Do you have difficulty preparing food and eating? No   Do you have difficulty bathing yourself, getting dressed or grooming yourself? No   Do you have difficulty using the toilet? No   Do you have difficulty moving around from place to place? No   Do you have trouble with steps or getting out of a bed or a chair? No   Current Diet Well Balanced Diet   Dental Exam Up to date   Eye Exam Up to date   Exercise (times per week) 3 times per week   Current Exercise Activities Include Walking   Do you need help using the phone?  No   Are you deaf or do you have serious difficulty hearing?  No   Do you need help with transportation? No   Do you need help shopping? No   Do you need help preparing meals?  No   Do you need help with housework?  No   Do you need help with laundry? No   Do you need help taking your medications? No   Do you need help managing money? No   Do you ever drive or ride in a car without wearing a seat belt? No   Have you felt unusual stress, anger or loneliness in the last month? No   Who do you live with? Alone   If you need help, do you have trouble finding someone available to you? No   Do you have difficulty concentrating, remembering or making decisions? No         Does the patient have evidence of cognitive impairment? No    Asprin use counseling:Does not need ASA (and currently is not on it)    Age-appropriate Screening Schedule:  Refer to the list below for future screening recommendations based on patient's age, sex and/or medical conditions. Orders for these recommended tests are listed in the plan section. The patient has been provided with a written plan.    Health Maintenance   Topic Date Due   • MAMMOGRAM  06/08/2016   • INFLUENZA VACCINE  Completed   • ZOSTER VACCINE  Completed   • TDAP/TD  VACCINES  Discontinued          The following portions of the patient's history were reviewed and updated as appropriate: allergies, current medications, past family history, past medical history, past social history, past surgical history and problem list.    Outpatient Medications Prior to Visit   Medication Sig Dispense Refill   • Ascorbic Acid (VITAMIN C) 500 MG capsule Take  by mouth daily.     • CALCIUM PO Take  by mouth.     • Cholecalciferol (VITAMIN D-3) 1000 UNITS capsule Take  by mouth daily.     • levothyroxine (SYNTHROID, LEVOTHROID) 100 MCG tablet TAKE 1 TABLET BY MOUTH  DAILY 90 tablet 1   • MULTIPLE VITAMINS PO Take  by mouth daily.     • multivitamin (THERAGRAN) tablet tablet Take  by mouth Daily.     • Naproxen Sodium (ALEVE) 220 MG capsule Take 220 mg by mouth 2 (Two) Times a Day As Needed (pain). 60 each 0   • Turmeric 450 MG capsule Take 1,050 mg by mouth Daily.     • Calcium Carb-Cholecalciferol (CALCIUM + D3) 600-200 MG-UNIT tablet Take  by mouth daily.       No facility-administered medications prior to visit.        Patient Active Problem List   Diagnosis   • Gastroesophageal reflux disease with esophagitis   • Hypothyroidism   • Vitamin D deficiency   • Arthritis   • Seasonal allergic rhinitis due to pollen   • Encounter for Medicare annual wellness exam       Advanced Care Planning:  ACP discussion was held with the patient during this visit. Patient has an advance directive in EMR which is still valid.     Review of Systems   Constitutional: Positive for appetite change.   HENT: Negative.    Eyes: Negative.    Respiratory: Negative.    Cardiovascular: Negative.    Gastrointestinal: Negative.    Endocrine: Negative.    Genitourinary:        She has some incontinence on way to rest room   Musculoskeletal: Positive for arthralgias.   Skin: Negative.    Allergic/Immunologic: Positive for environmental allergies.   Neurological: Negative.    Hematological: Bruises/bleeds easily.  "  Psychiatric/Behavioral: Negative.        Compared to one year ago, the patient feels her physical health is the same.  Compared to one year ago, the patient feels her mental health is the same.    Reviewed chart for potential of high risk medication in the elderly: yes  Reviewed chart for potential of harmful drug interactions in the elderly:yes    Objective         Vitals:    11/09/20 1110   BP: 120/74   Pulse: 93   Temp: 97 °F (36.1 °C)   SpO2: 96%   Weight: 62.6 kg (138 lb)   Height: 160 cm (63\")   PainSc: 0-No pain       Body mass index is 24.45 kg/m².  Discussed the patient's BMI with her. The BMI is in the acceptable range.    Physical Exam  Vitals signs and nursing note reviewed.   Constitutional:       General: She is not in acute distress.     Appearance: Normal appearance. She is well-developed and normal weight. She is not ill-appearing.   HENT:      Head: Normocephalic and atraumatic.      Right Ear: Tympanic membrane, ear canal and external ear normal.      Left Ear: Tympanic membrane, ear canal and external ear normal.      Ears:      Comments: Left earlobe with small nodule noted under earring hole consistent with scarring  Eyes:      General:         Right eye: No discharge.         Left eye: No discharge.      Extraocular Movements: Extraocular movements intact.      Conjunctiva/sclera: Conjunctivae normal.      Pupils: Pupils are equal, round, and reactive to light.   Neck:      Musculoskeletal: Normal range of motion and neck supple.      Thyroid: No thyromegaly.   Cardiovascular:      Rate and Rhythm: Normal rate and regular rhythm.      Pulses: Normal pulses.      Heart sounds: Normal heart sounds. No murmur.      Comments: No carotid bruits  Pulmonary:      Effort: Pulmonary effort is normal. No respiratory distress.      Breath sounds: Normal breath sounds. No wheezing.   Abdominal:      General: Bowel sounds are normal. There is no distension.      Palpations: Abdomen is soft. There is no " mass.      Tenderness: There is no abdominal tenderness.   Musculoskeletal: Normal range of motion.      Right lower leg: No edema.      Left lower leg: No edema.   Lymphadenopathy:      Cervical: No cervical adenopathy.   Skin:     General: Skin is warm.      Findings: No rash.   Neurological:      General: No focal deficit present.      Mental Status: She is alert and oriented to person, place, and time. Mental status is at baseline.      Cranial Nerves: No cranial nerve deficit.      Motor: No weakness.      Coordination: Coordination normal.      Gait: Gait normal.   Psychiatric:         Mood and Affect: Mood normal.         Behavior: Behavior normal.         Thought Content: Thought content normal.         Judgment: Judgment normal.               Assessment/Plan   Medicare Risks and Personalized Health Plan  CMS Preventative Services Quick Reference  Diabetic Lab Screening   Urinary Incontinence    The above risks/problems have been discussed with the patient.  Pertinent information has been shared with the patient in the After Visit Summary.  Follow up plans and orders are seen below in the Assessment/Plan Section.    Diagnoses and all orders for this visit:    1. Encounter for Medicare annual wellness exam (Primary)  -     CBC & Differential  -     Comprehensive Metabolic Panel  -     TSH  -     T4, Free    2. Other specified hypothyroidism  -     TSH  -     T4, Free    3. Seasonal allergic rhinitis due to pollen    4. Gastroesophageal reflux disease with esophagitis without hemorrhage  -     CBC & Differential  -     Comprehensive Metabolic Panel    5. Vitamin D deficiency    6. Arthritis    Labs as noted  Continue current dose of levothyroxine, will adjust dose if needed based on lab results  Her allergies are not bothersome at this time  She has no current GERD symptoms that are bothersome, she takes Tums as needed for GERD  Continue daily vitamin D, she is currently on 2800 units daily, last level was 68  1 year ago  Continue Aleve once daily, take with food for underlying arthritis, her symptoms are well controlled  Continue daily vitamin C also due to current pandemic as this helps with her immune system  Continue daily turmeric to help with arthritis pain and keep cholesterol levels stable  Explained to her that left earlobe earring hole is trying to close as she has not been using it regularly and she has underlying scarring noted    Follow Up:  Return in about 6 months (around 5/9/2021) for Next scheduled follow up.     An After Visit Summary and PPPS were given to the patient.

## 2020-11-10 LAB
ALBUMIN SERPL-MCNC: 4 G/DL (ref 3.5–5.2)
ALBUMIN/GLOB SERPL: 1.6 G/DL
ALP SERPL-CCNC: 67 U/L (ref 39–117)
ALT SERPL-CCNC: 17 U/L (ref 1–33)
AST SERPL-CCNC: 21 U/L (ref 1–32)
BASOPHILS # BLD AUTO: 0.11 10*3/MM3 (ref 0–0.2)
BASOPHILS NFR BLD AUTO: 1.5 % (ref 0–1.5)
BILIRUB SERPL-MCNC: 0.4 MG/DL (ref 0–1.2)
BUN SERPL-MCNC: 17 MG/DL (ref 8–23)
BUN/CREAT SERPL: 20.2 (ref 7–25)
CALCIUM SERPL-MCNC: 9.3 MG/DL (ref 8.6–10.5)
CHLORIDE SERPL-SCNC: 103 MMOL/L (ref 98–107)
CO2 SERPL-SCNC: 25.5 MMOL/L (ref 22–29)
CREAT SERPL-MCNC: 0.84 MG/DL (ref 0.57–1)
EOSINOPHIL # BLD AUTO: 0.52 10*3/MM3 (ref 0–0.4)
EOSINOPHIL NFR BLD AUTO: 7.1 % (ref 0.3–6.2)
ERYTHROCYTE [DISTWIDTH] IN BLOOD BY AUTOMATED COUNT: 13.7 % (ref 12.3–15.4)
GLOBULIN SER CALC-MCNC: 2.5 GM/DL
GLUCOSE SERPL-MCNC: 84 MG/DL (ref 65–99)
HCT VFR BLD AUTO: 42.5 % (ref 34–46.6)
HGB BLD-MCNC: 13.9 G/DL (ref 12–15.9)
IMM GRANULOCYTES # BLD AUTO: 0.01 10*3/MM3 (ref 0–0.05)
IMM GRANULOCYTES NFR BLD AUTO: 0.1 % (ref 0–0.5)
LYMPHOCYTES # BLD AUTO: 2.25 10*3/MM3 (ref 0.7–3.1)
LYMPHOCYTES NFR BLD AUTO: 30.8 % (ref 19.6–45.3)
MCH RBC QN AUTO: 30.8 PG (ref 26.6–33)
MCHC RBC AUTO-ENTMCNC: 32.7 G/DL (ref 31.5–35.7)
MCV RBC AUTO: 94.2 FL (ref 79–97)
MONOCYTES # BLD AUTO: 0.68 10*3/MM3 (ref 0.1–0.9)
MONOCYTES NFR BLD AUTO: 9.3 % (ref 5–12)
NEUTROPHILS # BLD AUTO: 3.73 10*3/MM3 (ref 1.7–7)
NEUTROPHILS NFR BLD AUTO: 51.2 % (ref 42.7–76)
NRBC BLD AUTO-RTO: 0 /100 WBC (ref 0–0.2)
PLATELET # BLD AUTO: 237 10*3/MM3 (ref 140–450)
POTASSIUM SERPL-SCNC: 4.3 MMOL/L (ref 3.5–5.2)
PROT SERPL-MCNC: 6.5 G/DL (ref 6–8.5)
RBC # BLD AUTO: 4.51 10*6/MM3 (ref 3.77–5.28)
SODIUM SERPL-SCNC: 135 MMOL/L (ref 136–145)
T4 FREE SERPL-MCNC: 1.5 NG/DL (ref 0.93–1.7)
TSH SERPL DL<=0.005 MIU/L-ACNC: 2.61 UIU/ML (ref 0.27–4.2)
WBC # BLD AUTO: 7.3 10*3/MM3 (ref 3.4–10.8)

## 2020-12-07 RX ORDER — LEVOTHYROXINE SODIUM 0.1 MG/1
TABLET ORAL
Qty: 90 TABLET | Refills: 3 | Status: SHIPPED | OUTPATIENT
Start: 2020-12-07 | End: 2021-11-11 | Stop reason: SDUPTHER

## 2021-05-10 ENCOUNTER — OFFICE VISIT (OUTPATIENT)
Dept: INTERNAL MEDICINE | Facility: CLINIC | Age: 86
End: 2021-05-10

## 2021-05-10 VITALS
BODY MASS INDEX: 24.27 KG/M2 | TEMPERATURE: 97 F | SYSTOLIC BLOOD PRESSURE: 120 MMHG | OXYGEN SATURATION: 96 % | HEIGHT: 63 IN | HEART RATE: 97 BPM | DIASTOLIC BLOOD PRESSURE: 74 MMHG | WEIGHT: 137 LBS

## 2021-05-10 DIAGNOSIS — M19.90 ARTHRITIS: ICD-10-CM

## 2021-05-10 DIAGNOSIS — J30.1 SEASONAL ALLERGIC RHINITIS DUE TO POLLEN: ICD-10-CM

## 2021-05-10 DIAGNOSIS — K21.00 GASTROESOPHAGEAL REFLUX DISEASE WITH ESOPHAGITIS WITHOUT HEMORRHAGE: ICD-10-CM

## 2021-05-10 DIAGNOSIS — E55.9 VITAMIN D DEFICIENCY: ICD-10-CM

## 2021-05-10 DIAGNOSIS — E03.8 OTHER SPECIFIED HYPOTHYROIDISM: Primary | ICD-10-CM

## 2021-05-10 PROCEDURE — 99214 OFFICE O/P EST MOD 30 MIN: CPT | Performed by: INTERNAL MEDICINE

## 2021-05-10 NOTE — PROGRESS NOTES
Chief Complaint   Patient presents with   • Follow-up     for GERD and hypothyroidism. Pt states her nostril stay dry.      Subjective   Fanta Rodney is a 85 y.o. female.     Here today for follow up of hypothyroid, allergies, GERD, DJD, vit D def.  hypothyroid- she is compliant with medication on empty stomach before other meds/food.  No difficulty swallowing, change in voice, constipation.  She has chronic thinning of hair  Allergies- have not been bad this spring so far.  She complains that her nostrils are dry. She has been using [place] and it has not been helpful.  Her nose feels dry, no bloody nose.    GERD- currently has no symptoms.  She uses TUMS if needed  Vit D def- she take calcium supplement with 800 u of vit D and vit  u a day  DJD- currently on one aleve and turmeric also for DJD with good control of sxs.  She also exercises every morning on awakening with stretches.  She had surgery on her right knee and since is not having any further pain  HCM- no longer needs mammogram or colonoscopy.  All vaccines are UTD including COVID vaccines       The following portions of the patient's history were reviewed and updated as appropriate: allergies, current medications, past family history, past medical history, past social history, past surgical history and problem list.    Review of Systems   Constitutional: Negative for activity change, appetite change and unexpected weight change.   HENT: Negative for trouble swallowing and voice change.         Dry nose, nasal secretions that are chronic   Eyes: Negative for visual disturbance.   Respiratory: Negative for shortness of breath.    Cardiovascular: Negative for chest pain, palpitations and leg swelling.   Gastrointestinal: Negative for abdominal pain and constipation.   Genitourinary: Negative for hematuria.   Musculoskeletal: Negative for back pain.   Neurological: Negative for headaches.   Psychiatric/Behavioral: Negative for dysphoric mood and sleep  "disturbance.       Objective   /74   Pulse 97   Temp 97 °F (36.1 °C)   Ht 160 cm (63\")   Wt 62.1 kg (137 lb)   SpO2 96%   BMI 24.27 kg/m²   Body mass index is 24.27 kg/m².  Physical Exam  Vitals and nursing note reviewed.   Constitutional:       General: She is not in acute distress.     Appearance: Normal appearance. She is well-developed. She is not ill-appearing.      Comments: Kind and pleasant female, appears stated age and in NAD today   HENT:      Head: Normocephalic and atraumatic.      Right Ear: External ear normal.      Left Ear: External ear normal.      Nose:      Comments: Dry nasal mucosa with scabbing noted  Eyes:      General:         Right eye: No discharge.         Left eye: No discharge.      Extraocular Movements: Extraocular movements intact.      Conjunctiva/sclera: Conjunctivae normal.      Pupils: Pupils are equal, round, and reactive to light.   Neck:      Thyroid: No thyromegaly.      Vascular: No carotid bruit.      Comments: No thyromegaly or mass  Cardiovascular:      Rate and Rhythm: Normal rate and regular rhythm.      Pulses: Normal pulses.      Heart sounds: Murmur heard.        Comments: SM 1/6 over precordium with occasional ectopic beat  Pulmonary:      Effort: Pulmonary effort is normal. No respiratory distress.      Breath sounds: Normal breath sounds. No wheezing.   Abdominal:      General: Bowel sounds are normal. There is no distension.      Palpations: Abdomen is soft.      Tenderness: There is no abdominal tenderness.   Musculoskeletal:         General: Normal range of motion.      Cervical back: Normal range of motion and neck supple.      Right lower leg: No edema.      Left lower leg: No edema.   Lymphadenopathy:      Cervical: No cervical adenopathy.   Skin:     General: Skin is warm.      Findings: No rash.   Neurological:      General: No focal deficit present.      Mental Status: She is alert and oriented to person, place, and time. Mental status is at " baseline.      Cranial Nerves: No cranial nerve deficit.      Motor: No weakness.      Coordination: Coordination normal.      Gait: Gait normal.   Psychiatric:         Mood and Affect: Mood normal.         Behavior: Behavior normal.         Thought Content: Thought content normal.         Judgment: Judgment normal.         Assessment/Plan   Fanta BEN Rodney is here today and the following problems have been addressed:      Diagnoses and all orders for this visit:    1. Other specified hypothyroidism (Primary)    2. Seasonal allergic rhinitis due to pollen    3. Gastroesophageal reflux disease with esophagitis without hemorrhage    4. Arthritis    5. Vitamin D deficiency    Other orders  -     mupirocin (BACTROBAN) 2 % nasal ointment; into the nostril(s) as directed by provider 2 (Two) Times a Day.  Dispense: 1 g; Refill: 0    Continue current dose of levothyroxine, TFTs in normal range  She denies any current allergy symptoms, however nasal mucosa is very dry with scabbing  Treat nasal mucosa scabbing with Bactroban 2% nasal ointment twice daily for 7 to 10 days  If nasal mucosa remains dry after treatment with Bactroban, recommend nasal saline as needed  Continue Pepcid, GERD is well controlled  Continue calcium and vitamin D daily  Turmeric is working well for underlying DJD symptoms    Return to clinic in 6 months for annual Medicare wellness exam with labs    Please note that portions of this note were completed with a voice recognition program.  Efforts were made to edit dictation, but occasionally words are mistranscribed.

## 2021-10-12 ENCOUNTER — FLU SHOT (OUTPATIENT)
Dept: INTERNAL MEDICINE | Facility: CLINIC | Age: 86
End: 2021-10-12

## 2021-10-12 DIAGNOSIS — Z23 NEED FOR INFLUENZA VACCINATION: Primary | ICD-10-CM

## 2021-10-12 PROCEDURE — 90662 IIV NO PRSV INCREASED AG IM: CPT | Performed by: INTERNAL MEDICINE

## 2021-10-12 PROCEDURE — G0008 ADMIN INFLUENZA VIRUS VAC: HCPCS | Performed by: INTERNAL MEDICINE

## 2021-11-11 ENCOUNTER — OFFICE VISIT (OUTPATIENT)
Dept: INTERNAL MEDICINE | Facility: CLINIC | Age: 86
End: 2021-11-11

## 2021-11-11 VITALS
OXYGEN SATURATION: 96 % | SYSTOLIC BLOOD PRESSURE: 118 MMHG | BODY MASS INDEX: 24.27 KG/M2 | HEART RATE: 77 BPM | DIASTOLIC BLOOD PRESSURE: 68 MMHG | WEIGHT: 137 LBS | HEIGHT: 63 IN | TEMPERATURE: 97.1 F

## 2021-11-11 DIAGNOSIS — K21.00 GASTROESOPHAGEAL REFLUX DISEASE WITH ESOPHAGITIS WITHOUT HEMORRHAGE: ICD-10-CM

## 2021-11-11 DIAGNOSIS — E03.8 OTHER SPECIFIED HYPOTHYROIDISM: ICD-10-CM

## 2021-11-11 DIAGNOSIS — Z00.00 ENCOUNTER FOR MEDICARE ANNUAL WELLNESS EXAM: Primary | ICD-10-CM

## 2021-11-11 PROCEDURE — 1170F FXNL STATUS ASSESSED: CPT | Performed by: INTERNAL MEDICINE

## 2021-11-11 PROCEDURE — 99397 PER PM REEVAL EST PAT 65+ YR: CPT | Performed by: INTERNAL MEDICINE

## 2021-11-11 PROCEDURE — G0439 PPPS, SUBSEQ VISIT: HCPCS | Performed by: INTERNAL MEDICINE

## 2021-11-11 PROCEDURE — 1126F AMNT PAIN NOTED NONE PRSNT: CPT | Performed by: INTERNAL MEDICINE

## 2021-11-11 PROCEDURE — 1159F MED LIST DOCD IN RCRD: CPT | Performed by: INTERNAL MEDICINE

## 2021-11-11 RX ORDER — LEVOTHYROXINE SODIUM 0.1 MG/1
100 TABLET ORAL DAILY
Qty: 90 TABLET | Refills: 3 | Status: SHIPPED | OUTPATIENT
Start: 2021-11-11 | End: 2022-12-01 | Stop reason: SDUPTHER

## 2021-11-11 NOTE — PROGRESS NOTES
The ABCs of the Annual Wellness Visit  Subsequent Medicare Wellness Visit    Chief Complaint   Patient presents with   • Medicare Wellness-subsequent      Subjective    History of Present Illness:  Fanta Rodney is a 86 y.o. female who presents for a Subsequent Medicare Wellness Visit.  PMH of hypothyroid, allergies, vit D def, GERD and DJD.  Her BP and HR are good today.  She denies any GERD sxs.  She takes her vit D and calcium daily.  Takes her thyroid med in AM before all other meds and food.  She takes aleve twice a day and it helps her DJD.  Has had flu shot and will get her COVID booster soon.     The following portions of the patient's history were reviewed and   updated as appropriate: allergies, current medications, past family history, past medical history, past social history, past surgical history and problem list.    Compared to one year ago, the patient feels her physical   health is the same.    Compared to one year ago, the patient feels her mental   health is the same.    Recent Hospitalizations:  She was not admitted to the hospital during the last year.       Current Medical Providers:  Patient Care Team:  Vermeesch, Marilyn K, MD as PCP - General    Outpatient Medications Prior to Visit   Medication Sig Dispense Refill   • Ascorbic Acid (VITAMIN C) 500 MG capsule Take  by mouth daily.     • CALCIUM PO Take  by mouth.     • Cholecalciferol (VITAMIN D-3) 1000 UNITS capsule Take  by mouth daily.     • mupirocin (BACTROBAN) 2 % nasal ointment into the nostril(s) as directed by provider 2 (Two) Times a Day. 1 g 0   • Naproxen Sodium (ALEVE) 220 MG capsule Take 220 mg by mouth 2 (Two) Times a Day As Needed (pain). 60 each 0   • Turmeric 450 MG capsule Take 1,050 mg by mouth Daily.     • levothyroxine (SYNTHROID, LEVOTHROID) 100 MCG tablet TAKE 1 TABLET BY MOUTH  DAILY 90 tablet 3   • MULTIPLE VITAMINS PO Take  by mouth daily.       No facility-administered medications prior to visit.       No  "opioid medication identified on active medication list. I have reviewed chart for other potential  high risk medication/s and harmful drug interactions in the elderly.          Aspirin is not on active medication list.  Aspirin use is not indicated based on review of current medical condition/s. Risk of harm outweighs potential benefits.  .    Patient Active Problem List   Diagnosis   • Gastroesophageal reflux disease with esophagitis   • Hypothyroidism   • Vitamin D deficiency   • Arthritis   • Seasonal allergic rhinitis due to pollen   • Encounter for Medicare annual wellness exam     Advance Care Planning  Advance Directive is on file.  ACP discussion was held with the patient during this visit. Patient has an advance directive in EMR which is still valid.     Review of Systems   Constitutional: Negative.    HENT: Negative.  Negative for trouble swallowing and voice change.    Eyes: Negative.    Respiratory: Negative.    Cardiovascular: Positive for leg swelling.   Gastrointestinal: Negative.  Negative for constipation.   Endocrine: Negative.    Genitourinary: Negative.    Musculoskeletal: Positive for arthralgias.   Skin: Negative.    Allergic/Immunologic: Negative.    Neurological: Negative.    Hematological: Negative.    Psychiatric/Behavioral: Negative.  Negative for dysphoric mood.        Objective    Vitals:    11/11/21 1058   BP: 118/68   Pulse: 77   Temp: 97.1 °F (36.2 °C)   SpO2: 96%   Weight: 62.1 kg (137 lb)   Height: 160 cm (63\")   PainSc: 0-No pain     BMI Readings from Last 1 Encounters:   11/11/21 24.27 kg/m²   BMI is within normal parameters. No follow-up required.    Does the patient have evidence of cognitive impairment? No    Physical Exam  Vitals and nursing note reviewed.   Constitutional:       General: She is not in acute distress.     Appearance: Normal appearance. She is well-developed. She is not ill-appearing.      Comments: Kind and pleasant female, appears stated age and in NAD today "   HENT:      Head: Normocephalic and atraumatic.      Right Ear: Tympanic membrane, ear canal and external ear normal.      Left Ear: Tympanic membrane, ear canal and external ear normal.   Eyes:      General:         Right eye: No discharge.         Left eye: No discharge.      Extraocular Movements: Extraocular movements intact.      Conjunctiva/sclera: Conjunctivae normal.      Pupils: Pupils are equal, round, and reactive to light.   Neck:      Thyroid: No thyromegaly.      Vascular: No carotid bruit.      Comments: No thyromegaly or mass  Cardiovascular:      Rate and Rhythm: Normal rate and regular rhythm.      Pulses: Normal pulses.      Heart sounds: Normal heart sounds. No murmur heard.      Pulmonary:      Effort: Pulmonary effort is normal. No respiratory distress.      Breath sounds: Normal breath sounds. No wheezing.   Chest:   Breasts:      Right: Normal. No inverted nipple, mass, nipple discharge, skin change, tenderness, axillary adenopathy or supraclavicular adenopathy.      Left: Normal. No inverted nipple, mass, nipple discharge, skin change, tenderness, axillary adenopathy or supraclavicular adenopathy.       Abdominal:      General: Bowel sounds are normal. There is no distension.      Palpations: Abdomen is soft.      Tenderness: There is no abdominal tenderness.   Musculoskeletal:         General: Deformity present. Normal range of motion.      Cervical back: Normal range of motion and neck supple.      Right lower leg: No edema.      Left lower leg: No edema.      Comments: Significant DJD changes noted in hands   Lymphadenopathy:      Cervical: No cervical adenopathy.      Upper Body:      Right upper body: No supraclavicular, axillary or pectoral adenopathy.      Left upper body: No supraclavicular, axillary or pectoral adenopathy.   Skin:     General: Skin is warm.      Findings: No rash.   Neurological:      General: No focal deficit present.      Mental Status: She is alert and oriented  to person, place, and time. Mental status is at baseline.      Cranial Nerves: No cranial nerve deficit.      Motor: No weakness.      Coordination: Coordination normal.      Gait: Gait normal.   Psychiatric:         Mood and Affect: Mood normal.         Behavior: Behavior normal.         Thought Content: Thought content normal.         Judgment: Judgment normal.                 HEALTH RISK ASSESSMENT    Smoking Status:  Social History     Tobacco Use   Smoking Status Never Smoker   Smokeless Tobacco Never Used     Alcohol Consumption:  Social History     Substance and Sexual Activity   Alcohol Use Yes    Comment: OCCASIONALLY     Fall Risk Screen:    [facility] Fall Risk Assessment was completed, and patient is at LOW risk for falls.Assessment completed on:11/11/2021    Depression Screening:  PHQ-2/PHQ-9 Depression Screening 11/11/2021   Little interest or pleasure in doing things 0   Feeling down, depressed, or hopeless 0   Total Score 0       Health Habits and Functional and Cognitive Screening:  Functional & Cognitive Status 11/11/2021   Do you have difficulty preparing food and eating? No   Do you have difficulty bathing yourself, getting dressed or grooming yourself? No   Do you have difficulty using the toilet? No   Do you have difficulty moving around from place to place? No   Do you have trouble with steps or getting out of a bed or a chair? Yes   Current Diet Well Balanced Diet   Dental Exam Up to date   Eye Exam Up to date   Exercise (times per week) 5 times per week   Current Exercises Include Walking   Current Exercise Activities Include -   Do you need help using the phone?  No   Are you deaf or do you have serious difficulty hearing?  No   Do you need help with transportation? No   Do you need help shopping? No   Do you need help preparing meals?  No   Do you need help with housework?  No   Do you need help with laundry? No   Do you need help taking your medications? No   Do you need help managing money?  No   Do you ever drive or ride in a car without wearing a seat belt? No   Have you felt unusual stress, anger or loneliness in the last month? No   Who do you live with? Alone   If you need help, do you have trouble finding someone available to you? No   Have you been bothered in the last four weeks by sexual problems? No   Do you have difficulty concentrating, remembering or making decisions? No       Age-appropriate Screening Schedule:  Refer to the list below for future screening recommendations based on patient's age, sex and/or medical conditions. Orders for these recommended tests are listed in the plan section. The patient has been provided with a written plan.    Health Maintenance   Topic Date Due   • DXA SCAN  11/11/2021 (Originally 1935)   • INFLUENZA VACCINE  Completed   • ZOSTER VACCINE  Completed   • MAMMOGRAM  Discontinued   • TDAP/TD VACCINES  Discontinued              Assessment/Plan   CMS Preventative Services Quick Reference  Risk Factors Identified During Encounter  Immunizations Discussed/Encouraged (specific Immunizations; COVID19  The above risks/problems have been discussed with the patient.  Follow up actions/plans if indicated are seen below in the Assessment/Plan Section.  Pertinent information has been shared with the patient in the After Visit Summary.    Diagnoses and all orders for this visit:    1. Encounter for Medicare annual wellness exam (Primary)  -     CBC & Differential  -     Comprehensive Metabolic Panel  -     Lipid Panel With / Chol / HDL Ratio  -     T4, Free  -     TSH    2. Other specified hypothyroidism  -     T4, Free  -     TSH    3. Gastroesophageal reflux disease with esophagitis without hemorrhage  -     CBC & Differential    Other orders  -     levothyroxine (SYNTHROID, LEVOTHROID) 100 MCG tablet; Take 1 tablet by mouth Daily.  Dispense: 90 tablet; Refill: 3    Labs as noted  Continue current dose of levothyroxine, will adjust dose as needed based on  labs  GERD is currently doing well, she denies any symptoms  Continue turmeric and Aleve as needed for DJD symptoms  Continue calcium and vitamin D for underlying osteopenia, patient declines DEXA scan  Encourage daily vitamin C  Patient will schedule her Covid 19 booster soon    Follow Up:   Return in about 6 months (around 5/11/2022) for Next scheduled follow up.     An After Visit Summary and PPPS were made available to the patient.

## 2021-11-12 LAB
ALBUMIN SERPL-MCNC: 4 G/DL (ref 3.5–5.2)
ALBUMIN/GLOB SERPL: 1.6 G/DL
ALP SERPL-CCNC: 64 U/L (ref 39–117)
ALT SERPL-CCNC: 14 U/L (ref 1–33)
AST SERPL-CCNC: 22 U/L (ref 1–32)
BASOPHILS # BLD AUTO: 0.07 10*3/MM3 (ref 0–0.2)
BASOPHILS NFR BLD AUTO: 1.2 % (ref 0–1.5)
BILIRUB SERPL-MCNC: 0.4 MG/DL (ref 0–1.2)
BUN SERPL-MCNC: 19 MG/DL (ref 8–23)
BUN/CREAT SERPL: 20.7 (ref 7–25)
CALCIUM SERPL-MCNC: 9 MG/DL (ref 8.6–10.5)
CHLORIDE SERPL-SCNC: 108 MMOL/L (ref 98–107)
CHOLEST SERPL-MCNC: 169 MG/DL (ref 0–200)
CHOLEST/HDLC SERPL: 3.13 {RATIO}
CO2 SERPL-SCNC: 23.6 MMOL/L (ref 22–29)
CREAT SERPL-MCNC: 0.92 MG/DL (ref 0.57–1)
EOSINOPHIL # BLD AUTO: 0.49 10*3/MM3 (ref 0–0.4)
EOSINOPHIL NFR BLD AUTO: 8.5 % (ref 0.3–6.2)
ERYTHROCYTE [DISTWIDTH] IN BLOOD BY AUTOMATED COUNT: 13.4 % (ref 12.3–15.4)
GLOBULIN SER CALC-MCNC: 2.5 GM/DL
GLUCOSE SERPL-MCNC: 89 MG/DL (ref 65–99)
HCT VFR BLD AUTO: 42.1 % (ref 34–46.6)
HDLC SERPL-MCNC: 54 MG/DL (ref 40–60)
HGB BLD-MCNC: 14 G/DL (ref 12–15.9)
IMM GRANULOCYTES # BLD AUTO: 0.01 10*3/MM3 (ref 0–0.05)
IMM GRANULOCYTES NFR BLD AUTO: 0.2 % (ref 0–0.5)
LDLC SERPL CALC-MCNC: 98 MG/DL (ref 0–100)
LYMPHOCYTES # BLD AUTO: 1.86 10*3/MM3 (ref 0.7–3.1)
LYMPHOCYTES NFR BLD AUTO: 32.2 % (ref 19.6–45.3)
MCH RBC QN AUTO: 31 PG (ref 26.6–33)
MCHC RBC AUTO-ENTMCNC: 33.3 G/DL (ref 31.5–35.7)
MCV RBC AUTO: 93.1 FL (ref 79–97)
MONOCYTES # BLD AUTO: 0.56 10*3/MM3 (ref 0.1–0.9)
MONOCYTES NFR BLD AUTO: 9.7 % (ref 5–12)
NEUTROPHILS # BLD AUTO: 2.78 10*3/MM3 (ref 1.7–7)
NEUTROPHILS NFR BLD AUTO: 48.2 % (ref 42.7–76)
NRBC BLD AUTO-RTO: 0 /100 WBC (ref 0–0.2)
PLATELET # BLD AUTO: 242 10*3/MM3 (ref 140–450)
POTASSIUM SERPL-SCNC: 4.3 MMOL/L (ref 3.5–5.2)
PROT SERPL-MCNC: 6.5 G/DL (ref 6–8.5)
RBC # BLD AUTO: 4.52 10*6/MM3 (ref 3.77–5.28)
SODIUM SERPL-SCNC: 141 MMOL/L (ref 136–145)
T4 FREE SERPL-MCNC: 1.59 NG/DL (ref 0.93–1.7)
TRIGL SERPL-MCNC: 94 MG/DL (ref 0–150)
TSH SERPL DL<=0.005 MIU/L-ACNC: 4.01 UIU/ML (ref 0.27–4.2)
VLDLC SERPL CALC-MCNC: 17 MG/DL (ref 5–40)
WBC # BLD AUTO: 5.77 10*3/MM3 (ref 3.4–10.8)

## 2022-05-12 ENCOUNTER — OFFICE VISIT (OUTPATIENT)
Dept: INTERNAL MEDICINE | Facility: CLINIC | Age: 87
End: 2022-05-12

## 2022-05-12 VITALS
HEART RATE: 92 BPM | WEIGHT: 135 LBS | TEMPERATURE: 97 F | BODY MASS INDEX: 23.92 KG/M2 | HEIGHT: 63 IN | DIASTOLIC BLOOD PRESSURE: 62 MMHG | SYSTOLIC BLOOD PRESSURE: 122 MMHG | OXYGEN SATURATION: 97 %

## 2022-05-12 DIAGNOSIS — M62.838 NECK MUSCLE SPASM: ICD-10-CM

## 2022-05-12 DIAGNOSIS — M19.90 ARTHRITIS: ICD-10-CM

## 2022-05-12 DIAGNOSIS — E03.8 OTHER SPECIFIED HYPOTHYROIDISM: Primary | ICD-10-CM

## 2022-05-12 DIAGNOSIS — E55.9 VITAMIN D DEFICIENCY: ICD-10-CM

## 2022-05-12 PROCEDURE — 99213 OFFICE O/P EST LOW 20 MIN: CPT | Performed by: INTERNAL MEDICINE

## 2022-10-20 ENCOUNTER — FLU SHOT (OUTPATIENT)
Dept: INTERNAL MEDICINE | Facility: CLINIC | Age: 87
End: 2022-10-20

## 2022-10-20 DIAGNOSIS — Z23 NEED FOR INFLUENZA VACCINATION: Primary | ICD-10-CM

## 2022-10-20 PROCEDURE — G0008 ADMIN INFLUENZA VIRUS VAC: HCPCS | Performed by: INTERNAL MEDICINE

## 2022-10-20 PROCEDURE — 90662 IIV NO PRSV INCREASED AG IM: CPT | Performed by: INTERNAL MEDICINE

## 2022-12-01 ENCOUNTER — OFFICE VISIT (OUTPATIENT)
Dept: INTERNAL MEDICINE | Facility: CLINIC | Age: 87
End: 2022-12-01

## 2022-12-01 VITALS
BODY MASS INDEX: 23.57 KG/M2 | OXYGEN SATURATION: 96 % | TEMPERATURE: 98 F | HEART RATE: 102 BPM | HEIGHT: 63 IN | WEIGHT: 133 LBS | SYSTOLIC BLOOD PRESSURE: 120 MMHG | DIASTOLIC BLOOD PRESSURE: 62 MMHG

## 2022-12-01 DIAGNOSIS — E03.8 OTHER SPECIFIED HYPOTHYROIDISM: ICD-10-CM

## 2022-12-01 DIAGNOSIS — Z00.00 ENCOUNTER FOR MEDICARE ANNUAL WELLNESS EXAM: Primary | ICD-10-CM

## 2022-12-01 DIAGNOSIS — R06.83 SNORING: ICD-10-CM

## 2022-12-01 DIAGNOSIS — K21.00 GASTROESOPHAGEAL REFLUX DISEASE WITH ESOPHAGITIS WITHOUT HEMORRHAGE: ICD-10-CM

## 2022-12-01 LAB
ALBUMIN SERPL-MCNC: 4.2 G/DL (ref 3.5–5.2)
ALBUMIN/GLOB SERPL: 1.4 G/DL
ALP SERPL-CCNC: 70 U/L (ref 39–117)
ALT SERPL-CCNC: 10 U/L (ref 1–33)
AST SERPL-CCNC: 19 U/L (ref 1–32)
BASOPHILS # BLD AUTO: 0.09 10*3/MM3 (ref 0–0.2)
BASOPHILS NFR BLD AUTO: 1.3 % (ref 0–1.5)
BILIRUB SERPL-MCNC: 0.6 MG/DL (ref 0–1.2)
BUN SERPL-MCNC: 15 MG/DL (ref 8–23)
BUN/CREAT SERPL: 17.2 (ref 7–25)
CALCIUM SERPL-MCNC: 9.7 MG/DL (ref 8.6–10.5)
CHLORIDE SERPL-SCNC: 106 MMOL/L (ref 98–107)
CHOLEST SERPL-MCNC: 182 MG/DL (ref 0–200)
CHOLEST/HDLC SERPL: 3.03 {RATIO}
CO2 SERPL-SCNC: 27 MMOL/L (ref 22–29)
CREAT SERPL-MCNC: 0.87 MG/DL (ref 0.57–1)
EGFRCR SERPLBLD CKD-EPI 2021: 65.4 ML/MIN/1.73
EOSINOPHIL # BLD AUTO: 0.4 10*3/MM3 (ref 0–0.4)
EOSINOPHIL NFR BLD AUTO: 6 % (ref 0.3–6.2)
ERYTHROCYTE [DISTWIDTH] IN BLOOD BY AUTOMATED COUNT: 13.4 % (ref 12.3–15.4)
GLOBULIN SER CALC-MCNC: 3 GM/DL
GLUCOSE SERPL-MCNC: 89 MG/DL (ref 65–99)
HCT VFR BLD AUTO: 45.2 % (ref 34–46.6)
HDLC SERPL-MCNC: 60 MG/DL (ref 40–60)
HGB BLD-MCNC: 14.7 G/DL (ref 12–15.9)
IMM GRANULOCYTES # BLD AUTO: 0.01 10*3/MM3 (ref 0–0.05)
IMM GRANULOCYTES NFR BLD AUTO: 0.1 % (ref 0–0.5)
LDLC SERPL CALC-MCNC: 100 MG/DL (ref 0–100)
LYMPHOCYTES # BLD AUTO: 1.95 10*3/MM3 (ref 0.7–3.1)
LYMPHOCYTES NFR BLD AUTO: 29 % (ref 19.6–45.3)
MCH RBC QN AUTO: 31.1 PG (ref 26.6–33)
MCHC RBC AUTO-ENTMCNC: 32.5 G/DL (ref 31.5–35.7)
MCV RBC AUTO: 95.6 FL (ref 79–97)
MONOCYTES # BLD AUTO: 0.61 10*3/MM3 (ref 0.1–0.9)
MONOCYTES NFR BLD AUTO: 9.1 % (ref 5–12)
NEUTROPHILS # BLD AUTO: 3.66 10*3/MM3 (ref 1.7–7)
NEUTROPHILS NFR BLD AUTO: 54.5 % (ref 42.7–76)
NRBC BLD AUTO-RTO: 0 /100 WBC (ref 0–0.2)
PLATELET # BLD AUTO: 249 10*3/MM3 (ref 140–450)
POTASSIUM SERPL-SCNC: 4.6 MMOL/L (ref 3.5–5.2)
PROT SERPL-MCNC: 7.2 G/DL (ref 6–8.5)
RBC # BLD AUTO: 4.73 10*6/MM3 (ref 3.77–5.28)
SODIUM SERPL-SCNC: 143 MMOL/L (ref 136–145)
T4 FREE SERPL-MCNC: 1.66 NG/DL (ref 0.93–1.7)
TRIGL SERPL-MCNC: 123 MG/DL (ref 0–150)
TSH SERPL DL<=0.005 MIU/L-ACNC: 2.18 UIU/ML (ref 0.27–4.2)
VLDLC SERPL CALC-MCNC: 22 MG/DL (ref 5–40)
WBC # BLD AUTO: 6.72 10*3/MM3 (ref 3.4–10.8)

## 2022-12-01 PROCEDURE — 1159F MED LIST DOCD IN RCRD: CPT | Performed by: INTERNAL MEDICINE

## 2022-12-01 PROCEDURE — G0439 PPPS, SUBSEQ VISIT: HCPCS | Performed by: INTERNAL MEDICINE

## 2022-12-01 RX ORDER — LEVOTHYROXINE SODIUM 0.1 MG/1
100 TABLET ORAL DAILY
Qty: 90 TABLET | Refills: 1 | Status: SHIPPED | OUTPATIENT
Start: 2022-12-01

## 2022-12-01 NOTE — PROGRESS NOTES
The ABCs of the Annual Wellness Visit  Subsequent Medicare Wellness Visit    Chief Complaint   Patient presents with   • Medicare Wellness-subsequent     States her eye doctor is concerned about sleep apnea after doing her eye exam on 10/4/22.      Subjective    History of Present Illness:  Fanta Rodney is a 87 y.o. female who presents for a Subsequent Medicare Wellness Visit.  PMH of allergies, hypothyroid, vit D def, GERD, DJD. All vaccines are UTD.  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  She has no pain.  The eye doctor is also concerned about possible sleep apnea due to vein disease.  Her kids tell her she snores at night.  She denies GERD sxs.  She takes her vit D and calcium.  Takes her thyroid med on empty stomach.  No changes in voice or swallow.      The following portions of the patient's history were reviewed and   updated as appropriate: allergies, current medications, past family history, past medical history, past social history, past surgical history and problem list.    Compared to one year ago, the patient feels her physical   health is the same.    Compared to one year ago, the patient feels her mental   health is the same.    Recent Hospitalizations:  She was not admitted to the hospital during the last year.       Current Medical Providers:  Patient Care Team:  Vermeesch, Marilyn K, MD as PCP - General (Internal Medicine & Pediatrics)    Outpatient Medications Prior to Visit   Medication Sig Dispense Refill   • Ascorbic Acid (VITAMIN C) 500 MG capsule Take  by mouth daily.     • CALCIUM PO Take  by mouth.     • Cholecalciferol (VITAMIN D-3) 1000 UNITS capsule Take  by mouth daily.     • Naproxen Sodium (ALEVE) 220 MG capsule Take 220 mg by mouth 2 (Two) Times a Day As Needed (pain). 60 each 0   • Turmeric 450 MG capsule Take 1,050 mg by mouth Daily.     • levothyroxine (SYNTHROID, LEVOTHROID) 100 MCG tablet Take 1 tablet by  "mouth Daily. 90 tablet 3   • triamcinolone (KENALOG) 0.5 % ointment Apply 1 application topically to the appropriate area as directed 3 (Three) Times a Day. 45 g 0     No facility-administered medications prior to visit.       No opioid medication identified on active medication list. I have reviewed chart for other potential  high risk medication/s and harmful drug interactions in the elderly.          Aspirin is not on active medication list.  Aspirin use is not indicated based on review of current medical condition/s. Risk of harm outweighs potential benefits.  .    Patient Active Problem List   Diagnosis   • Gastroesophageal reflux disease with esophagitis   • Hypothyroidism   • Vitamin D deficiency   • Arthritis   • Seasonal allergic rhinitis due to pollen   • Encounter for Medicare annual wellness exam   • Neck muscle spasm   • Snoring     Advance Care Planning  Advance Directive is on file.  ACP discussion was held with the patient during this visit. Patient has an advance directive in EMR which is still valid.     Review of Systems   Constitutional: Negative.    HENT: Negative.    Eyes: Negative.    Respiratory:        Snoring at night   Cardiovascular: Negative.    Gastrointestinal: Negative.    Endocrine: Negative.    Genitourinary: Negative.    Musculoskeletal: Positive for neck stiffness.   Skin: Negative.    Allergic/Immunologic: Negative.    Neurological: Negative.    Hematological: Bruises/bleeds easily.   Psychiatric/Behavioral: Negative.         Objective    Vitals:    12/01/22 1107   BP: 120/62   Pulse: 102   Temp: 98 °F (36.7 °C)   SpO2: 96%   Weight: 60.3 kg (133 lb)   Height: 160 cm (63\")   PainSc: 0-No pain     Estimated body mass index is 23.56 kg/m² as calculated from the following:    Height as of this encounter: 160 cm (63\").    Weight as of this encounter: 60.3 kg (133 lb).    BMI is within normal parameters. No other follow-up for BMI required.      Does the patient have evidence of " cognitive impairment? No    Physical Exam  Vitals and nursing note reviewed.   Constitutional:       General: She is not in acute distress.     Appearance: Normal appearance. She is well-developed. She is not ill-appearing.      Comments: Kind and pleasant female, appears stated age and in NAD today   HENT:      Head: Normocephalic and atraumatic.      Right Ear: Tympanic membrane, ear canal and external ear normal.      Left Ear: Tympanic membrane, ear canal and external ear normal.      Nose: No congestion.      Mouth/Throat:      Pharynx: No posterior oropharyngeal erythema.   Eyes:      General:         Right eye: No discharge.         Left eye: No discharge.      Extraocular Movements: Extraocular movements intact.      Conjunctiva/sclera: Conjunctivae normal.      Pupils: Pupils are equal, round, and reactive to light.   Neck:      Thyroid: No thyromegaly.      Vascular: No carotid bruit.      Comments: No thyromegaly or mass  Cardiovascular:      Rate and Rhythm: Normal rate and regular rhythm.      Pulses: Normal pulses.      Heart sounds: Normal heart sounds. No murmur heard.     Comments: Occasional ectopic beat  Pulmonary:      Effort: Pulmonary effort is normal. No respiratory distress.      Breath sounds: Normal breath sounds. No wheezing.   Chest:   Breasts:     Right: Normal. No inverted nipple, mass, nipple discharge, skin change or tenderness.      Left: Normal. No inverted nipple, mass, nipple discharge, skin change or tenderness.   Abdominal:      General: Bowel sounds are normal. There is no distension.      Palpations: Abdomen is soft.      Tenderness: There is no abdominal tenderness.   Musculoskeletal:         General: Deformity present.      Cervical back: Normal range of motion and neck supple.      Right lower leg: No edema.      Left lower leg: No edema.      Comments: Hands/fingers with severe DJD changes but no significant pain   Lymphadenopathy:      Cervical: No cervical adenopathy.       Upper Body:      Right upper body: No supraclavicular, axillary or pectoral adenopathy.      Left upper body: No supraclavicular, axillary or pectoral adenopathy.   Skin:     General: Skin is warm.      Findings: No rash.   Neurological:      General: No focal deficit present.      Mental Status: She is alert and oriented to person, place, and time. Mental status is at baseline.      Cranial Nerves: No cranial nerve deficit.      Motor: No weakness.      Coordination: Coordination normal.      Gait: Gait normal.   Psychiatric:         Mood and Affect: Mood normal.         Behavior: Behavior normal.         Thought Content: Thought content normal.         Judgment: Judgment normal.                 HEALTH RISK ASSESSMENT    Smoking Status:  Social History     Tobacco Use   Smoking Status Never   Smokeless Tobacco Never     Alcohol Consumption:  Social History     Substance and Sexual Activity   Alcohol Use Yes    Comment: OCCASIONALLY     Fall Risk Screen:    [facility], and patient is at LOW risk for falls.Assessment completed on:12/1/2022    Depression Screening:  PHQ-2/PHQ-9 Depression Screening 12/1/2022   Retired PHQ-9 Total Score -   Retired Total Score -   Little Interest or Pleasure in Doing Things 0-->not at all   Feeling Down, Depressed or Hopeless 0-->not at all   PHQ-9: Brief Depression Severity Measure Score 0       Health Habits and Functional and Cognitive Screening:  Functional & Cognitive Status 12/1/2022   Do you have difficulty preparing food and eating? No   Do you have difficulty bathing yourself, getting dressed or grooming yourself? No   Do you have difficulty using the toilet? No   Do you have difficulty moving around from place to place? No   Do you have trouble with steps or getting out of a bed or a chair? No   Current Diet Unhealthy Diet   Dental Exam Up to date   Eye Exam Up to date   Exercise (times per week) 5 times per week   Current Exercises Include  Walking;Other   Current Exercise Activities Include -   Do you need help using the phone?  No   Are you deaf or do you have serious difficulty hearing?  No   Do you need help with transportation? No   Do you need help shopping? No   Do you need help preparing meals?  No   Do you need help with housework?  No   Do you need help with laundry? No   Do you need help taking your medications? No   Do you need help managing money? No   Do you ever drive or ride in a car without wearing a seat belt? No   Have you felt unusual stress, anger or loneliness in the last month? No   Who do you live with? Alone   If you need help, do you have trouble finding someone available to you? No   Have you been bothered in the last four weeks by sexual problems? No   Do you have difficulty concentrating, remembering or making decisions? No       Age-appropriate Screening Schedule:  Refer to the list below for future screening recommendations based on patient's age, sex and/or medical conditions. Orders for these recommended tests are listed in the plan section. The patient has been provided with a written plan.    Health Maintenance   Topic Date Due   • DXA SCAN  12/01/2022 (Originally 2/28/2013)   • INFLUENZA VACCINE  Completed   • ZOSTER VACCINE  Completed   • MAMMOGRAM  Discontinued   • TDAP/TD VACCINES  Discontinued              Assessment & Plan   CMS Preventative Services Quick Reference  Risk Factors Identified During Encounter  None Identified  The above risks/problems have been discussed with the patient.  Follow up actions/plans if indicated are seen below in the Assessment/Plan Section.  Pertinent information has been shared with the patient in the After Visit Summary.    Diagnoses and all orders for this visit:    1. Encounter for Medicare annual wellness exam (Primary)  -     CBC & Differential  -     Comprehensive Metabolic Panel  -     Lipid Panel With / Chol / HDL Ratio  -     T4, Free  -     TSH    2. Other specified  hypothyroidism  -     T4, Free  -     TSH    3. Gastroesophageal reflux disease with esophagitis without hemorrhage  -     CBC & Differential    4. Snoring  -     Ambulatory Referral to Sleep Medicine    Other orders  -     levothyroxine (SYNTHROID, LEVOTHROID) 100 MCG tablet; Take 1 tablet by mouth Daily.  Dispense: 90 tablet; Refill: 1    Labs as noted  Continue current dose of levothyroxine, will adjust dose if needed based on labs  Continue current dose of calcium and vitamin D  Patient declines DEXA scan for now  Referred for sleep study evaluation due to findings on recent eye exam and complaints of snoring  All vaccines are currently up-to-date    Follow Up:   Return in about 1 year (around 12/1/2023) for Medicare Wellness.     An After Visit Summary and PPPS were made available to the patient.

## 2023-01-03 ENCOUNTER — OFFICE VISIT (OUTPATIENT)
Dept: NEUROLOGY | Facility: CLINIC | Age: 88
End: 2023-01-03
Payer: MEDICARE

## 2023-01-03 VITALS
DIASTOLIC BLOOD PRESSURE: 74 MMHG | HEIGHT: 63 IN | HEART RATE: 85 BPM | WEIGHT: 134.4 LBS | SYSTOLIC BLOOD PRESSURE: 120 MMHG | BODY MASS INDEX: 23.81 KG/M2 | OXYGEN SATURATION: 97 % | TEMPERATURE: 97.1 F

## 2023-01-03 DIAGNOSIS — R06.83 SNORING: Primary | ICD-10-CM

## 2023-01-03 DIAGNOSIS — H34.8192 RETINAL VEIN OCCLUSION, UNSPECIFIED LATERALITY, UNSPECIFIED RETINAL VEIN: ICD-10-CM

## 2023-01-03 DIAGNOSIS — E03.8 OTHER SPECIFIED HYPOTHYROIDISM: ICD-10-CM

## 2023-01-03 DIAGNOSIS — G47.10 HYPERSOMNIA, UNSPECIFIED: ICD-10-CM

## 2023-01-03 PROCEDURE — 99214 OFFICE O/P EST MOD 30 MIN: CPT | Performed by: NURSE PRACTITIONER

## 2023-01-03 PROCEDURE — 1160F RVW MEDS BY RX/DR IN RCRD: CPT | Performed by: NURSE PRACTITIONER

## 2023-01-03 PROCEDURE — 1159F MED LIST DOCD IN RCRD: CPT | Performed by: NURSE PRACTITIONER

## 2023-01-03 NOTE — PROGRESS NOTES
New Sleep Patient Office Visit      Patient Name: Fanta Rodney  : 1935   MRN: [id number]     Referring Physician: Vermeesch, Marilyn K, MD    Chief Complaint:    Chief Complaint   Patient presents with   • Consult     NP, in office to establish care for marisa. Patient states she is told she snores.        History of Present Illness: Fanta Rodney is a 87 y.o. female who is here today to establish care with Sleep Medicine.  Sleep questionnaire reviewed- she denies excessive daytime sleepiness, she frequently falls asleep while watching TV or when she sits down and is quiet during the day, she does not take scheduled naps, it takes her 5 minutes to fall asleep at night, she wakes up once during sleep and is able to fall back to sleep easily, she sleeps 6-7 hours per night and feels well rested upon awakening, she snores, she denies any sleep-related abnormal behaviors.  She says she was sent here for a retinal vein occlusion (seeing Dr. Alisha Weinberg).  Additional risk factors- hypothyroidism.    [place] Score: 4    Subjective      Review of Systems:   Review of Systems   Constitutional: Negative for fatigue, fever, unexpected weight gain and unexpected weight loss.   HENT: Negative for hearing loss, sore throat, swollen glands, tinnitus and trouble swallowing.    Eyes: Positive for visual disturbance. Negative for blurred vision, double vision and photophobia.   Respiratory: Negative for cough, chest tightness and shortness of breath.    Cardiovascular: Negative for chest pain, palpitations and leg swelling.   Gastrointestinal: Negative for constipation, diarrhea and nausea.   Endocrine: Negative for cold intolerance and heat intolerance.   Musculoskeletal: Negative for gait problem, neck pain and neck stiffness.   Skin: Negative for color change and rash.   Allergic/Immunologic: Negative for environmental allergies and food allergies.   Neurological: Negative for dizziness, syncope, facial  asymmetry, speech difficulty, weakness, headache, memory problem and confusion.   Psychiatric/Behavioral: Negative for agitation, behavioral problems and depressed mood. The patient is not nervous/anxious.        Past Medical History:   Past Medical History:   Diagnosis Date   • Anxiety    • Arthritis    • Bilateral cataracts     HISTORY OF HAS HAD REMOVED   • Body piercing     EARS ONLY   • Choledocholithiasis    • Disease of thyroid gland    • GERD (gastroesophageal reflux disease)    • History of esophagogastroduodenoscopy (EGD) 09/06/2013   • History of toe fracture 10/2012    Left middle toe   • Murmur    • Nausea with vomiting    • Osteoarthritis    • Stress fracture of metatarsal bone 06/02/2013    Left Foot - Treated by Dr. Manley   • Tear of meniscus of knee    • Urinary tract infection    • Wears glasses    • Wears partial dentures     LOWER ONLY       Past Surgical History:   Past Surgical History:   Procedure Laterality Date   • ADENOIDECTOMY     • CATARACT EXTRACTION Right 08/31/2016    Dr. Michele Damian   • CATARACT EXTRACTION Left 09/29/2016    Dr. Michele Damian    • CHOLECYSTECTOMY  09/27/2013    Dr. Alexis Lobo   • COLONOSCOPY  02/08/2013    Dr. Alexis Gary   • DILATION AND CURETTAGE, DIAGNOSTIC / THERAPEUTIC  1967   • ENDOSCOPY     • FINGER SURGERY Right 08/17/2006    2 fingers - joint replacement - JAMIL Weaver   • JOINT REPLACEMENT  Aug. 17,2006-July24,2018    Two fingers in right hand-Knee replacement   • THYROIDECTOMY  10/09/1967    JAMIL Pantoja - Diseased Thyroid (Goiter)   • TONSILLECTOMY     • TOTAL KNEE ARTHROPLASTY Right 07/24/2018    Procedure: Elective right total knee replacement (BIOMET);  Surgeon: Oscar Snow MD;  Location: [facility];  Service: Orthopedics       Family History:   Family History   Problem Relation Age of Onset   • Cancer Mother    • Osteoarthritis Mother    • Cancer Father        Social History:   Social History     Socioeconomic  History   • Marital status:    Tobacco Use   • Smoking status: Never   • Smokeless tobacco: Never   Vaping Use   • Vaping Use: Never used   Substance and Sexual Activity   • Alcohol use: Yes     Comment: OCCASIONALLY   • Drug use: No   • Sexual activity: Never       Medications:     Current Outpatient Medications:   •  Ascorbic Acid (VITAMIN C) 500 MG capsule, Take  by mouth daily., Disp: , Rfl:   •  CALCIUM PO, Take  by mouth., Disp: , Rfl:   •  Cholecalciferol (VITAMIN D-3) 1000 UNITS capsule, Take  by mouth daily., Disp: , Rfl:   •  levothyroxine (SYNTHROID, LEVOTHROID) 100 MCG tablet, Take 1 tablet by mouth Daily., Disp: 90 tablet, Rfl: 1  •  Naproxen Sodium (ALEVE) 220 MG capsule, Take 220 mg by mouth 2 (Two) Times a Day As Needed (pain)., Disp: 60 each, Rfl: 0    Allergies:   Allergies   Allergen Reactions   • Tomato Swelling     Fresh tomatoes.    • Nexium [Esomeprazole Magnesium] GI Intolerance       Objective     Physical Exam:  Vital Signs:   Vitals:    01/03/23 0943   BP: 120/74   BP Location: Left arm   Patient Position: Sitting   Cuff Size: Adult   Pulse: 85   Temp: 97.1 °F (36.2 °C)   SpO2: 97%   Weight: 61 kg (134 lb 6.4 oz)   Height: 160 cm (63\")   PainSc: 0-No pain     BMI: Body mass index is 23.81 kg/m².  Neck Circumference:  13 1/8     Physical Exam  Vitals and nursing note reviewed.   Constitutional:       General: She is not in acute distress.     Appearance: Normal appearance. She is well-developed and normal weight. She is not diaphoretic.   HENT:      Head: Normocephalic and atraumatic.      Comments: Mallampati 3  Eyes:      Extraocular Movements: Extraocular movements intact.      Conjunctiva/sclera: Conjunctivae normal.      Pupils: Pupils are equal, round, and reactive to light.   Neck:      Trachea: Trachea normal.   Cardiovascular:      Rate and Rhythm: Normal rate and regular rhythm.      Heart sounds: Murmur heard.     No friction rub. No gallop.      Comments: Murmur to  LUSB  Pulmonary:      Effort: Pulmonary effort is normal. No respiratory distress.      Breath sounds: Normal breath sounds. No wheezing or rales.   Musculoskeletal:         General: Normal range of motion.   Skin:     General: Skin is warm and dry.      Findings: No rash.   Neurological:      Mental Status: She is alert and oriented to person, place, and time.   Psychiatric:         Mood and Affect: Mood normal.         Behavior: Behavior normal.         Thought Content: Thought content normal.         Judgment: Judgment normal.         Assessment / Plan      Assessment/Plan:   Diagnoses and all orders for this visit:    1. Snoring (Primary)  -     Home Sleep Study; Future    2. Other specified hypothyroidism  -     Home Sleep Study; Future    3. Retinal vein occlusion, unspecified laterality, unspecified retinal vein  -     Home Sleep Study; Future    4. Hypersomnia, unspecified  -     Home Sleep Study; Future    5. BMI 23.0-23.9, adult     *Patient education on MANDY provided. She is agreeable to doing a home sleep study but is not interested in doing a PSG.   *Advised patient to avoid driving if drowsy.     Follow Up:   Return in about 2 months (around 3/3/2023) for F/U Obstructive Sleep Apnea.    I have advised the patient the need to continue the use of CPAP.  Gold standard for treatment of sleep apnea includes weight loss, use of cpap and avoidance of alcohol.  Untreated MANDY may increase the risk for development of hypertension, stroke, myocardial infarction, diabetes, cardiovascular disease, work-related issues and driving accidents. I have counseled and advised the patient to avoid driving or operating heavy/dangerous equipment if feeling drowsy.     BRIGID Bowie, FNP-C  [facility] Neurology and Sleep Medicine       Please note that portions of this note may have been completed with a voice recognition program. Efforts were made to edit the dictations, but occasionally words are  mistranscribed.

## 2023-01-10 ENCOUNTER — PATIENT ROUNDING (BHMG ONLY) (OUTPATIENT)
Dept: NEUROLOGY | Facility: CLINIC | Age: 88
End: 2023-01-10
Payer: MEDICARE

## 2023-02-20 ENCOUNTER — TELEPHONE (OUTPATIENT)
Dept: INTERNAL MEDICINE | Facility: CLINIC | Age: 88
End: 2023-02-20

## 2023-02-20 NOTE — TELEPHONE ENCOUNTER
Caller: Fanta Rodney    Relationship: Self    Best call back number: [phone]  What is the best time to reach you: ANY   Who are you requesting to speak with (clinical staff, provider,  specific staff member): CLINICAL       What was the call regarding: WAS AT A PARTY Saturday NIGHT AND SOMEONE HAD COVID. WANTS TO KNOW WHAT TO DO. HAS CONCERNS, WOULD LIKE CALL BACK. NO CURRENT SYMPTOMS.    Do you require a callback: YES

## 2023-02-22 ENCOUNTER — HOSPITAL ENCOUNTER (OUTPATIENT)
Dept: SLEEP MEDICINE | Facility: HOSPITAL | Age: 88
Discharge: HOME | End: 2023-02-22
Admitting: NURSE PRACTITIONER
Payer: MEDICARE

## 2023-02-22 DIAGNOSIS — E03.8 OTHER SPECIFIED HYPOTHYROIDISM: ICD-10-CM

## 2023-02-22 DIAGNOSIS — H34.8192 RETINAL VEIN OCCLUSION, UNSPECIFIED LATERALITY, UNSPECIFIED RETINAL VEIN: ICD-10-CM

## 2023-02-22 DIAGNOSIS — R06.83 SNORING: ICD-10-CM

## 2023-02-22 DIAGNOSIS — G47.10 HYPERSOMNIA, UNSPECIFIED: ICD-10-CM

## 2023-02-22 PROCEDURE — 95806 SLEEP STUDY UNATT&RESP EFFT: CPT

## 2023-02-27 PROCEDURE — 95806 SLEEP STUDY UNATT&RESP EFFT: CPT | Performed by: INTERNAL MEDICINE

## 2023-03-09 ENCOUNTER — OFFICE VISIT (OUTPATIENT)
Dept: NEUROLOGY | Facility: CLINIC | Age: 88
End: 2023-03-09
Payer: MEDICARE

## 2023-03-09 VITALS
HEART RATE: 65 BPM | BODY MASS INDEX: 23.74 KG/M2 | OXYGEN SATURATION: 96 % | WEIGHT: 134 LBS | DIASTOLIC BLOOD PRESSURE: 72 MMHG | HEIGHT: 63 IN | SYSTOLIC BLOOD PRESSURE: 120 MMHG | TEMPERATURE: 97.1 F

## 2023-03-09 DIAGNOSIS — G47.33 OBSTRUCTIVE SLEEP APNEA: Primary | ICD-10-CM

## 2023-03-09 PROCEDURE — 99213 OFFICE O/P EST LOW 20 MIN: CPT | Performed by: NURSE PRACTITIONER

## 2023-03-09 NOTE — PROGRESS NOTES
Follow Up Office Visit      Patient Name: Fanta Rodney  : 1935   MRN: [id number]     Chief Complaint:    Chief Complaint   Patient presents with   • Follow-up     Patient in office to discuss sleep study results.        History of Present Illness: Fanta Rodney is a 87 y.o. female who is here today to follow up with MANDY.  She was notified of her sleep study results but wanted to come in and discuss further with provider.  She is accompanied by her sister today.  She doesn't feel she has severe MANDY since she does not wake up from sleep gasping for breath or feeling bad.  *Home sleep study showed evidence of severe MANDY with an AHI of 36/hour and event-related hypoxia.     Following taken from previous visit note:  Fanta Rodney is a 87 y.o. female who is here today to establish care with Sleep Medicine.  Sleep questionnaire reviewed- she denies excessive daytime sleepiness, she frequently falls asleep while watching TV or when she sits down and is quiet during the day, she does not take scheduled naps, it takes her 5 minutes to fall asleep at night, she wakes up once during sleep and is able to fall back to sleep easily, she sleeps 6-7 hours per night and feels well rested upon awakening, she snores, she denies any sleep-related abnormal behaviors.  She says she was sent here for a retinal vein occlusion (seeing Dr. Alisha Weinberg).  Additional risk factors- hypothyroidism.     [place] Score: 4     Subjective      Review of Systems:   Review of Systems   Constitutional: Negative for chills, fatigue and fever.   HENT: Negative for facial swelling, hearing loss, sore throat, tinnitus and trouble swallowing.    Eyes: Negative for blurred vision, double vision, photophobia and visual disturbance.   Respiratory: Positive for apnea. Negative for cough, chest tightness and shortness of breath.    Cardiovascular: Negative for chest pain, palpitations and leg swelling.   Gastrointestinal: Negative  "for abdominal pain, nausea and vomiting.   Endocrine: Negative for cold intolerance and heat intolerance.   Musculoskeletal: Negative for gait problem, neck pain and neck stiffness.   Skin: Negative for color change and rash.   Allergic/Immunologic: Negative for environmental allergies and food allergies.   Neurological: Negative for dizziness, syncope, speech difficulty, weakness, light-headedness, numbness, headache and memory problem.   Psychiatric/Behavioral: Negative for behavioral problems, sleep disturbance and depressed mood. The patient is not nervous/anxious.        I have reviewed and the following portions of the patient's history were updated as appropriate: past family history, past medical history, past social history, past surgical history and problem list.    Medications:     Current Outpatient Medications:   •  Ascorbic Acid (VITAMIN C) 500 MG capsule, Take  by mouth daily., Disp: , Rfl:   •  Calcium Carbonate-Vitamin D (CALCIUM-D PO), Take  by mouth., Disp: , Rfl:   •  CALCIUM PO, Take  by mouth., Disp: , Rfl:   •  Cholecalciferol (VITAMIN D-3) 1000 UNITS capsule, Take  by mouth daily., Disp: , Rfl:   •  levothyroxine (SYNTHROID, LEVOTHROID) 100 MCG tablet, Take 1 tablet by mouth Daily., Disp: 90 tablet, Rfl: 1  •  Naproxen Sodium (ALEVE) 220 MG capsule, Take 220 mg by mouth 2 (Two) Times a Day As Needed (pain)., Disp: 60 each, Rfl: 0    Allergies:   Allergies   Allergen Reactions   • Tomato Swelling     Fresh tomatoes.    • Nexium [Esomeprazole Magnesium] GI Intolerance       Objective     Physical Exam:  Vital Signs:   Vitals:    03/09/23 1353   BP: 120/72   BP Location: Right arm   Patient Position: Sitting   Cuff Size: Adult   Pulse: 65   Temp: 97.1 °F (36.2 °C)   SpO2: 96%   Weight: 60.8 kg (134 lb)   Height: 160 cm (63\")   PainSc: 0-No pain     Body mass index is 23.74 kg/m².    Physical Exam  Vitals and nursing note reviewed.   Constitutional:       General: She is not in acute distress.    "  Appearance: Normal appearance. She is well-developed and normal weight. She is not diaphoretic.   HENT:      Head: Normocephalic and atraumatic.   Eyes:      Extraocular Movements: Extraocular movements intact.      Conjunctiva/sclera: Conjunctivae normal.      Pupils: Pupils are equal, round, and reactive to light.   Pulmonary:      Effort: Pulmonary effort is normal. No respiratory distress.   Musculoskeletal:         General: Normal range of motion.   Skin:     General: Skin is warm and dry.      Findings: No rash.   Neurological:      Mental Status: She is alert and oriented to person, place, and time.   Psychiatric:         Mood and Affect: Mood normal.         Behavior: Behavior normal.         Thought Content: Thought content normal.         Judgment: Judgment normal.         Neurologic Exam     Mental Status   Oriented to person, place, and time.     Cranial Nerves     CN III, IV, VI   Pupils are equal, round, and reactive to light.       Assessment / Plan      Assessment/Plan:   Diagnoses and all orders for this visit:    1. Obstructive sleep apnea (Primary)    2. BMI 23.0-23.9, adult    *Patient education on MANDY provided today.  I have discussed implications of untreated MANDY.   *Offered a trial of AutoPap therapy but she is not agreeable to that- she does say she will think about treatment and let me know if she decides to do it.     Follow Up:   Return if symptoms worsen or fail to improve.    BRIGID Bowie, FNP-C  [facility] Neurology and Sleep Medicine       Please note that portions of this note may have been completed with a voice recognition program. Efforts were made to edit the dictations, but occasionally words are mistranscribed.

## 2023-03-16 ENCOUNTER — TELEPHONE (OUTPATIENT)
Dept: NEUROLOGY | Facility: CLINIC | Age: 88
End: 2023-03-16
Payer: MEDICARE

## 2023-03-16 NOTE — TELEPHONE ENCOUNTER
Provider: ANN CHAMPION APRN    Caller: BOB    Relationship to Patient: SELF      Phone Number: [phone]    Reason for Call: CALLING TO SEE IF THEY CAN SET UP A PHONE CALL VISIT WITH PROVIDER.   STATED SHE HAS QUESTIONS REGARDING HER SLEEP APNEA.  STATED SHE IS NOT SURE IF SHE WANTS TO USE A CPAP MACHINE.  PT IS WANTING TO KNOW IF THERE IS A DIFFERENT ROUTE TO DO.  STATED THEY CAN DO TOMORROW (3-17-23)    PLEASE CALL & ADVISE

## 2023-04-21 ENCOUNTER — OFFICE VISIT (OUTPATIENT)
Dept: INTERNAL MEDICINE | Facility: CLINIC | Age: 88
End: 2023-04-21
Payer: MEDICARE

## 2023-04-21 VITALS
HEART RATE: 86 BPM | RESPIRATION RATE: 16 BRPM | DIASTOLIC BLOOD PRESSURE: 72 MMHG | SYSTOLIC BLOOD PRESSURE: 112 MMHG | OXYGEN SATURATION: 98 % | WEIGHT: 132.8 LBS | HEIGHT: 63 IN | BODY MASS INDEX: 23.53 KG/M2 | TEMPERATURE: 97.6 F

## 2023-04-21 DIAGNOSIS — S33.5XXA LUMBAR SPRAIN, INITIAL ENCOUNTER: Primary | ICD-10-CM

## 2023-04-21 PROBLEM — Z00.00 ENCOUNTER FOR MEDICARE ANNUAL WELLNESS EXAM: Status: RESOLVED | Noted: 2018-10-22 | Resolved: 2023-04-21

## 2023-04-21 RX ORDER — TIZANIDINE 2 MG/1
2 TABLET ORAL NIGHTLY PRN
Qty: 14 TABLET | Refills: 0 | Status: SHIPPED | OUTPATIENT
Start: 2023-04-21

## 2023-04-21 NOTE — PROGRESS NOTES
"Chief Complaint  Back Pain (Lower back, pt was trying to lift up her bed and a sharp pain struck and has not went away since, bending over is when she feels the most pain )    Subjective        Fanta Rodney presents to [facility]  History of Present Illness  Wednesday was lifting a bed, has to put in some blocks to elevated head due to reflux in herself and . Started having pain in lower back. Hurts to move. Has been tryign to continue exercises such as leg lifts in bed but has been somewhat difficult.       Objective   Vital Signs:  /72 (BP Location: Left arm, Patient Position: Sitting, Cuff Size: Adult)   Pulse 86   Temp 97.6 °F (36.4 °C) (Temporal)   Resp 16   Ht 160 cm (63\")   Wt 60.2 kg (132 lb 12.8 oz)   SpO2 98%   BMI 23.52 kg/m²   Estimated body mass index is 23.52 kg/m² as calculated from the following:    Height as of this encounter: 160 cm (63\").    Weight as of this encounter: 60.2 kg (132 lb 12.8 oz).       BMI is within normal parameters. No other follow-up for BMI required.      Physical Exam  Vitals and nursing note reviewed.   Constitutional:       General: She is not in acute distress.  Musculoskeletal:      Lumbar back: Spasms (right sided) present. No bony tenderness.   Neurological:      Mental Status: She is alert and oriented to person, place, and time.      Gait: Gait is intact.   Psychiatric:         Mood and Affect: Mood and affect normal.        Result Review :  The following data was reviewed by: Deepa Lawrence MD on 04/21/2023:  Comprehensive Metabolic Panel (12/01/2022 11:52)                   Assessment and Plan   Diagnoses and all orders for this visit:    1. Lumbar sprain, initial encounter (Primary)  -     tiZANidine (ZANAFLEX) 2 MG tablet; Take 1 tablet by mouth At Night As Needed for Muscle Spasms.  Dispense: 14 tablet; Refill: 0      No red flags to warrant imaging at this time, discussed would consider imaging at 4 " weeks if symptoms continue. Likely strain, encouraged movement as tolerated, similar to a crick in neck to gradually work it out. Discouraged lifting bed, daughter has told her to call if she needs this done and she'll assist (per pt). Discussed muscle relaxer, use at bedtime, can be sedating. If the 2 mg doesn't help can take 2 to equal 4 mg. If needs refill let me know. Okay also to use over the counter creams/rubs/patches.          Follow Up   Return for With Abdiaziz (as scheduled to establish new pcp).  Patient was given instructions and counseling regarding her condition or for health maintenance advice. Please see specific information pulled into the AVS if appropriate.

## 2023-05-08 ENCOUNTER — TELEPHONE (OUTPATIENT)
Dept: INTERNAL MEDICINE | Facility: CLINIC | Age: 88
End: 2023-05-08

## 2023-05-08 DIAGNOSIS — M54.50 LOW BACK PAIN, UNSPECIFIED BACK PAIN LATERALITY, UNSPECIFIED CHRONICITY, UNSPECIFIED WHETHER SCIATICA PRESENT: ICD-10-CM

## 2023-05-08 DIAGNOSIS — S33.5XXA LUMBAR SPRAIN, INITIAL ENCOUNTER: Primary | ICD-10-CM

## 2023-05-08 RX ORDER — LIDOCAINE 50 MG/G
1 PATCH TOPICAL EVERY 24 HOURS
Qty: 15 EACH | Refills: 0 | Status: SHIPPED | OUTPATIENT
Start: 2023-05-08

## 2023-05-08 NOTE — TELEPHONE ENCOUNTER
Referral to physical therapy entered. May also come by for xray of low back if she desires (we do not typically do xrays until about 4 weeks of pain, but she may prefer to go ahead and do one). I sent in lidocaine patches to apply to the area of pain to see if those help, if insurance doesn't cover suggest getting them over the counter. Has an appointment with her PCP on 5/18, keep that appointment.

## 2023-05-08 NOTE — TELEPHONE ENCOUNTER
Caller: Fanta Rodney    Relationship: Self    Best call back number:      [phone]      What is the best time to reach you:     ANY TIME    Who are you requesting to speak with (clinical staff, provider,  specific staff member):     DR BARKLEY - PATIENT HAD SAME DAY APPOINTMENT ON 4/21/23 AND WAS PRESCRIBED MUSCLE RELAXERS FOR BACK PAIN    PATIENT STATED THE MUSCLE RELAXERS HAVE NOT WORKED, AND REQUESTED A CALL BACK FROM DR BARKLEY TO DISCUSS OTHER OPTIONS SINCE SHE IS STILL EXPERIENCING BACK PAIN    PATIENT REQUESTED DR BARKLEY'S THOUGHTS IF A REFERRAL FOR PHYSICAL THERAPY COULD BE HELPFUL

## 2023-05-10 NOTE — TELEPHONE ENCOUNTER
Patient notified and said this has been going on for 3 weeks and she is feeling a little better and moving better. She has already been using OTC Lidocaine patches. She said Thank you for the PT referral also.

## 2023-05-12 ENCOUNTER — TREATMENT (OUTPATIENT)
Dept: PHYSICAL THERAPY | Facility: CLINIC | Age: 88
End: 2023-05-12
Payer: MEDICARE

## 2023-05-12 DIAGNOSIS — M54.50 ACUTE MIDLINE LOW BACK PAIN WITHOUT SCIATICA: Primary | ICD-10-CM

## 2023-05-12 NOTE — PROGRESS NOTES
Physical Therapy Initial Evaluation and Plan of Care  [address] [place] [address]      Patient: Fanta Rodney   : 1935  Diagnosis/ICD-10 Code:  Acute midline low back pain without sciatica [M54.50]  Referring practitioner: No ref. provider found  Date of Initial Visit: 2023  Today's Date: 2023  Patient seen for 1 session         Visit Diagnoses:    ICD-10-CM ICD-9-CM   1. Acute midline low back pain without sciatica  M54.50 724.2         Subjective Questionnaire: Oswestry:       Subjective Evaluation    History of Present Illness    Subjective comment: Pt reports about 3 weeks ago she sprained her back by lifting up a bed. Pain is getting better but still having pain at belt line especially when sitting and gettingup out of bed. Using salonpas and a heating pad which helps. Does housework and walks about 1 mile.  Uses a walker when she first gets out of bed to be safe.  Patient Occupation: retired Quality of life: fair    Pain  At worst pain ratin  Quality: dull ache  Relieving factors: change in position and heat  Exacerbated by: sitting, getting out of bed.  Progression: improved    Social Support  Lives in: one-story house  Lives with: alone    Diagnostic Tests  No diagnostic tests performed    Treatments  Previous treatment: medication  Patient Goals  Patient goals for therapy: decreased pain, increased motion, return to sport/leisure activities and independence with ADLs/IADLs             Objective          Static Posture     Lumbar Spine   Decreased lordosis.     Palpation   Left   Hypertonic in the lumbar paraspinals.   Tenderness of the lumbar paraspinals.     Right   Hypertonic in the lumbar paraspinals. Tenderness of the lumbar paraspinals.     Active Range of Motion     Additional Active Range of Motion Details  Limited flexion ROM due to pain and stability    Strength/Myotome Testing     Left Hip   Planes of Motion   Flexion: 4    Right Hip   Planes  of Motion   Flexion: 4    Left Knee   Flexion: 4+  Extension: 4+    Right Knee   Flexion: 4+  Extension: 4+    Left Ankle/Foot   Dorsiflexion: 5    Right Ankle/Foot   Dorsiflexion: 5    Tests     Lumbar   Positive repeated extension and repeated flexion.     Additional Tests Details  Repeated flexion worsened pain, repeated extension improved pain  Activation of TA aided with back pain during transitional movements    Ambulation     Observational Gait   Gait: within functional limits           Assessment & Plan     Assessment  Impairments: abnormal muscle tone, activity intolerance and pain with function  Functional Limitations: lifting, uncomfortable because of pain, sitting and unable to perform repetitive tasks  Assessment details: Pt is an 86 YO F who presents to the clinic with low back pain. Pt with signs and symptoms consistent with lumbar muscle strain and lumbar disk dysfunction. Pt would benefit from skilled PT for improving pain and overall function to aid with daily demands.    Barriers to therapy: age  Prognosis: good    Goals  Plan Goals: SHORT TERM GOALS:     4 weeks  1. Pt independent with HEP  2. Pt to demonstrate forward trunk flexion without increase in pain to aid with picking items up.  3. Pt to report ability to get out of bed without pain.    LONG TERM GOALS:   8 weeks  1. Pt to report ability to sit for greater than 1 hour without increase in back pain.   2. Pt to demonstrate ability to perform full functional squat with good form and without increased pain in the low back   3. Pt to report being able to  work in the home without increase in pain in the back  4. Pt to report return to walking for fitness without increase in pain.    Plan  Therapy options: will be seen for skilled therapy services  Planned modality interventions: cryotherapy and thermotherapy (hydrocollator packs)  Planned therapy interventions: abdominal trunk stabilization, body mechanics training, flexibility, functional  ROM exercises, home exercise program, joint mobilization, manual therapy, neuromuscular re-education, soft tissue mobilization, spinal/joint mobilization, strengthening, stretching and therapeutic activities  Frequency: 2x week  Duration in weeks: 8  Treatment plan discussed with: patient        History # of Personal Factors and/or Comorbidities: LOW (0)  Examination of Body System(s): # of elements: LOW (1-2)  Clinical Presentation: STABLE   Clinical Decision Making: LOW       Timed:         Manual Therapy:         mins  16689;     Therapeutic Exercise:   15      mins  50631;     Neuromuscular Janet:  10     mins  34105;    Therapeutic Activity:          mins  50877;     Gait Training:          mins  40364;     Ultrasound:          mins  30899;    Ionto                                   mins   99933  Self Care                            mins   64273  Canalith Repos         mins 55737      Un-Timed:  Electrical Stimulation:         mins  42271 ( );  Dry Needling          mins self-pay  Traction     30  mins 47811  Low Eval         Mins  15767  Mod Eval          Mins  85386  High Eval                            Mins  17376        Timed Treatment:   25   mins   Total Treatment:     55   mins      PT: Gely Jin PT     License Number: [id number]    Electronically signed by Gely Jin PT, 05/12/23, 2:03 PM EDT    Certification Period: 5/14/2023 thru 8/11/2023  I certify that the therapy services are furnished while this patient is under my care.  The services outlined above are required by this patient, and will be reviewed every 90 days.         Physician Signature:__________________________________________________    PHYSICIAN:   NPI:       DATE:     Please sign and return via fax to [email or web address] . Thank you, [facility].

## 2023-05-18 ENCOUNTER — OFFICE VISIT (OUTPATIENT)
Dept: INTERNAL MEDICINE | Facility: CLINIC | Age: 88
End: 2023-05-18
Payer: MEDICARE

## 2023-05-18 VITALS
OXYGEN SATURATION: 99 % | HEART RATE: 83 BPM | BODY MASS INDEX: 23.21 KG/M2 | TEMPERATURE: 97.6 F | HEIGHT: 63 IN | WEIGHT: 131 LBS | DIASTOLIC BLOOD PRESSURE: 74 MMHG | RESPIRATION RATE: 16 BRPM | SYSTOLIC BLOOD PRESSURE: 123 MMHG

## 2023-05-18 DIAGNOSIS — M13.0 CHRONIC POLYARTHRITIS: ICD-10-CM

## 2023-05-18 DIAGNOSIS — E78.2 MIXED HYPERLIPIDEMIA: Primary | ICD-10-CM

## 2023-05-18 DIAGNOSIS — E03.8 ADULT ONSET HYPOTHYROIDISM: ICD-10-CM

## 2023-05-18 RX ORDER — LEVOTHYROXINE SODIUM 0.1 MG/1
100 TABLET ORAL DAILY
Qty: 90 TABLET | Refills: 1 | Status: SHIPPED | OUTPATIENT
Start: 2023-05-18 | End: 2023-05-26 | Stop reason: SDUPTHER

## 2023-05-18 NOTE — PROGRESS NOTES
Subjective   Fanta Rodney is a 87 y.o. female.     Chief Complaint   Patient presents with   • Establish Care     Discuss a sleep study   • Hyperlipidemia       History of Present Illness   HPI:   The patient is   here for an initial visit and to follow up on the cholesterol and is trying to follow a diet. The patient is  also here to follow up on thyroid and is  due to get lab work done .  She complains of chronic polyarthritis, she states she was told she had sleep apnea but is unable to use the CPAP machine, the patient also needs refills on medications.   Hyperlipidemia   Pertinent negatives include no chest pain or shortness of breath.      Review of Systems  Chronic polyarthritis    Past Medical History:   Diagnosis Date   • Anxiety    • Arthritis    • Bilateral cataracts     HISTORY OF HAS HAD REMOVED   • Body piercing     EARS ONLY   • Choledocholithiasis    • Disease of thyroid gland    • GERD (gastroesophageal reflux disease)    • History of esophagogastroduodenoscopy (EGD) 09/06/2013   • History of toe fracture 10/2012    Left middle toe   • Murmur    • Nausea with vomiting    • Osteoarthritis    • Stress fracture of metatarsal bone 06/02/2013    Left Foot - Treated by Dr. Manley   • Tear of meniscus of knee    • Urinary tract infection    • Wears glasses    • Wears partial dentures     LOWER ONLY       Past Surgical History:   Procedure Laterality Date   • ADENOIDECTOMY     • CATARACT EXTRACTION Right 08/31/2016    Dr. Michele Damian   • CATARACT EXTRACTION Left 09/29/2016    Dr. Michele Damian    • CHOLECYSTECTOMY  09/27/2013    Dr. Alexis Lobo   • COLONOSCOPY  02/08/2013    Dr. Alexis Gary   • DILATION AND CURETTAGE, DIAGNOSTIC / THERAPEUTIC  1967   • ENDOSCOPY     • FINGER SURGERY Right 08/17/2006    2 fingers - joint replacement - Dr. Suh - [place]   • JOINT REPLACEMENT  Aug. 17,2006-July24,2018    Two fingers in right hand-Knee replacement   • THYROIDECTOMY  10/09/1967    Dr. Christiano Beard -  "[facility] - Diseased Thyroid (Goiter)   • TONSILLECTOMY     • TOTAL KNEE ARTHROPLASTY Right 07/24/2018    Procedure: Elective right total knee replacement (BIOMET);  Surgeon: Oscar Snow MD;  Location: [facility];  Service: Orthopedics       Family History   Problem Relation Age of Onset   • Cancer Mother    • Osteoarthritis Mother    • Cancer Father         reports that she has never smoked. She has never used smokeless tobacco. She reports current alcohol use. She reports that she does not use drugs.    Allergies   Allergen Reactions   • Tomato Swelling     Fresh tomatoes.    • Nexium [Esomeprazole Magnesium] GI Intolerance           Current Outpatient Medications:   •  Ascorbic Acid (VITAMIN C) 500 MG capsule, Take  by mouth daily., Disp: , Rfl:   •  Calcium Carbonate-Vitamin D (CALCIUM-D PO), Take  by mouth., Disp: , Rfl:   •  levothyroxine (SYNTHROID, LEVOTHROID) 100 MCG tablet, Take 1 tablet by mouth Daily., Disp: 90 tablet, Rfl: 1  •  lidocaine (Lidoderm) 5 %, Place 1 patch on the skin as directed by provider Daily. Remove & Discard patch within 12 hours or as directed by MD, Disp: 15 each, Rfl: 0      Objective   Blood pressure 123/74, pulse 83, temperature 97.6 °F (36.4 °C), resp. rate 16, height 160 cm (62.99\"), weight 59.4 kg (131 lb), SpO2 99 %.    Physical Exam  Vitals and nursing note reviewed.   Constitutional:       General: She is not in acute distress.     Appearance: Normal appearance. She is not diaphoretic.   HENT:      Head: Normocephalic and atraumatic.      Right Ear: External ear normal.      Left Ear: External ear normal.      Nose: Nose normal.   Eyes:      Extraocular Movements: Extraocular movements intact.      Conjunctiva/sclera: Conjunctivae normal.   Neck:      Trachea: Trachea normal.   Cardiovascular:      Rate and Rhythm: Normal rate and regular rhythm.      Heart sounds: Normal heart sounds.   Pulmonary:      Effort: Pulmonary effort is normal. No respiratory distress.    "   Breath sounds: Normal breath sounds.   Abdominal:      General: Abdomen is flat.   Musculoskeletal:         General: Deformity present.      Cervical back: Neck supple.      Comments: Moves all limbs  Severe deformities of hands   Skin:     General: Skin is warm.   Neurological:      Mental Status: She is alert and oriented to person, place, and time.      Comments: No gross motor or sensory deficits         BMI is within normal parameters. No other follow-up for BMI required.      Results for orders placed or performed in visit on 12/01/22   Comprehensive Metabolic Panel    Specimen: Blood   Result Value Ref Range    Glucose 89 65 - 99 mg/dL    BUN 15 8 - 23 mg/dL    Creatinine 0.87 0.57 - 1.00 mg/dL    EGFR Result 65.4 >60.0 mL/min/1.73    BUN/Creatinine Ratio 17.2 7.0 - 25.0    Sodium 143 136 - 145 mmol/L    Potassium 4.6 3.5 - 5.2 mmol/L    Chloride 106 98 - 107 mmol/L    Total CO2 27.0 22.0 - 29.0 mmol/L    Calcium 9.7 8.6 - 10.5 mg/dL    Total Protein 7.2 6.0 - 8.5 g/dL    Albumin 4.20 3.50 - 5.20 g/dL    Globulin 3.0 gm/dL    A/G Ratio 1.4 g/dL    Total Bilirubin 0.6 0.0 - 1.2 mg/dL    Alkaline Phosphatase 70 39 - 117 U/L    AST (SGOT) 19 1 - 32 U/L    ALT (SGPT) 10 1 - 33 U/L   Lipid Panel With / Chol / HDL Ratio    Specimen: Blood   Result Value Ref Range    Total Cholesterol 182 0 - 200 mg/dL    Triglycerides 123 0 - 150 mg/dL    HDL Cholesterol 60 40 - 60 mg/dL    VLDL Cholesterol Edgar 22 5 - 40 mg/dL    LDL Chol Calc (NIH) 100 0 - 100 mg/dL    Chol/HDL Ratio 3.03    T4, Free    Specimen: Blood   Result Value Ref Range    Free T4 1.66 0.93 - 1.70 ng/dL   TSH    Specimen: Blood   Result Value Ref Range    TSH 2.180 0.270 - 4.200 uIU/mL   CBC & Differential    Specimen: Blood   Result Value Ref Range    WBC 6.72 3.40 - 10.80 10*3/mm3    RBC 4.73 3.77 - 5.28 10*6/mm3    Hemoglobin 14.7 12.0 - 15.9 g/dL    Hematocrit 45.2 34.0 - 46.6 %    MCV 95.6 79.0 - 97.0 fL    MCH 31.1 26.6 - 33.0 pg    MCHC 32.5 31.5 -  35.7 g/dL    RDW 13.4 12.3 - 15.4 %    Platelets 249 140 - 450 10*3/mm3    Neutrophil Rel % 54.5 42.7 - 76.0 %    Lymphocyte Rel % 29.0 19.6 - 45.3 %    Monocyte Rel % 9.1 5.0 - 12.0 %    Eosinophil Rel % 6.0 0.3 - 6.2 %    Basophil Rel % 1.3 0.0 - 1.5 %    Neutrophils Absolute 3.66 1.70 - 7.00 10*3/mm3    Lymphocytes Absolute 1.95 0.70 - 3.10 10*3/mm3    Monocytes Absolute 0.61 0.10 - 0.90 10*3/mm3    Eosinophils Absolute 0.40 0.00 - 0.40 10*3/mm3    Basophils Absolute 0.09 0.00 - 0.20 10*3/mm3    Immature Granulocyte Rel % 0.1 0.0 - 0.5 %    Immature Grans Absolute 0.01 0.00 - 0.05 10*3/mm3    nRBC 0.0 0.0 - 0.2 /100 WBC         Assessment & Plan   Diagnoses and all orders for this visit:    1. Mixed hyperlipidemia (Primary)  -     CBC & Differential  -     Comprehensive Metabolic Panel  -     Lipid Panel    2. Adult onset hypothyroidism  -     TSH  -     levothyroxine (SYNTHROID, LEVOTHROID) 100 MCG tablet; Take 1 tablet by mouth Daily.  Dispense: 90 tablet; Refill: 1    3. Chronic polyarthritis      Plan:  1.  mixed hyperlipidemia: will obtain   fasting CMP and lipid panel.  Diet and exercise counseled,  Will continue current medications  2 . hypothyroidism: will obtain tsh , and continue levothyroxine  3.  Chronic polyarthritis: We will monitor           Tiera Freed MD

## 2023-05-22 ENCOUNTER — TREATMENT (OUTPATIENT)
Dept: PHYSICAL THERAPY | Facility: CLINIC | Age: 88
End: 2023-05-22
Payer: MEDICARE

## 2023-05-22 DIAGNOSIS — M54.50 ACUTE MIDLINE LOW BACK PAIN WITHOUT SCIATICA: Primary | ICD-10-CM

## 2023-05-22 NOTE — PROGRESS NOTES
Physical Therapy Daily Treatment Note  [address] [place] [address]      Patient: Fanta Rodney   : 1935  Referring practitioner: No ref. provider found  Date of Initial Visit: Type: THERAPY  Noted: 2023  Today's Date: 2023  Patient seen for 2 sessions         Visit Diagnoses:  No diagnosis found.    Subjective   Patient reports that pain is improving but still there, especially when she is bending over. Rates pain of 5/10 at worst. Notes that activating her core muscles helps.      Objective   See Exercise, Manual, and Modality Logs for complete treatment.   Observation: performs sit to stand with increased ease today    Assessment/Plan  Pt tolerated treatment without report of increased pain. Educated pt to continue with her current HEP to aid with decreasing pain. Continue with current POT progressing pt per tolerance.     Timed:         Manual Therapy: 16        mins  76575;     Therapeutic Exercise:     25    mins  50005;     Neuromuscular Janet:      mins  83876;    Therapeutic Activity:          mins  05868;     Gait Training:           mins  86995;     Ultrasound:          mins  49241;    Ionto                                   mins   32475  Self Care                            mins   07503  Canalith Repos         mins 23073      Un-Timed:  Electrical Stimulation:        mins  32145 ( );  Dry Needling          mins self-pay  Traction          mins 68683      Timed Treatment:   41   mins   Total Treatment:     41   mins    Gely Jin PT  [place] License: [id number]

## 2023-05-23 LAB
ALBUMIN SERPL-MCNC: 4 G/DL (ref 3.5–5.2)
ALBUMIN/GLOB SERPL: 1.4 G/DL
ALP SERPL-CCNC: 85 U/L (ref 39–117)
ALT SERPL-CCNC: 14 U/L (ref 1–33)
AST SERPL-CCNC: 17 U/L (ref 1–32)
BASOPHILS # BLD AUTO: 0.08 10*3/MM3 (ref 0–0.2)
BASOPHILS NFR BLD AUTO: 1.2 % (ref 0–1.5)
BILIRUB SERPL-MCNC: 0.6 MG/DL (ref 0–1.2)
BUN SERPL-MCNC: 14 MG/DL (ref 8–23)
BUN/CREAT SERPL: 13.5 (ref 7–25)
CALCIUM SERPL-MCNC: 9.9 MG/DL (ref 8.6–10.5)
CHLORIDE SERPL-SCNC: 105 MMOL/L (ref 98–107)
CHOLEST SERPL-MCNC: 175 MG/DL (ref 0–200)
CO2 SERPL-SCNC: 29.2 MMOL/L (ref 22–29)
CREAT SERPL-MCNC: 1.04 MG/DL (ref 0.57–1)
EGFRCR SERPLBLD CKD-EPI 2021: 52.8 ML/MIN/1.73
EOSINOPHIL # BLD AUTO: 0.38 10*3/MM3 (ref 0–0.4)
EOSINOPHIL NFR BLD AUTO: 5.7 % (ref 0.3–6.2)
ERYTHROCYTE [DISTWIDTH] IN BLOOD BY AUTOMATED COUNT: 13.3 % (ref 12.3–15.4)
GLOBULIN SER CALC-MCNC: 2.9 GM/DL
GLUCOSE SERPL-MCNC: 93 MG/DL (ref 65–99)
HCT VFR BLD AUTO: 45.1 % (ref 34–46.6)
HDLC SERPL-MCNC: 57 MG/DL (ref 40–60)
HGB BLD-MCNC: 14.7 G/DL (ref 12–15.9)
IMM GRANULOCYTES # BLD AUTO: 0.02 10*3/MM3 (ref 0–0.05)
IMM GRANULOCYTES NFR BLD AUTO: 0.3 % (ref 0–0.5)
LDLC SERPL CALC-MCNC: 99 MG/DL (ref 0–100)
LYMPHOCYTES # BLD AUTO: 1.88 10*3/MM3 (ref 0.7–3.1)
LYMPHOCYTES NFR BLD AUTO: 28.2 % (ref 19.6–45.3)
MCH RBC QN AUTO: 30.2 PG (ref 26.6–33)
MCHC RBC AUTO-ENTMCNC: 32.6 G/DL (ref 31.5–35.7)
MCV RBC AUTO: 92.6 FL (ref 79–97)
MONOCYTES # BLD AUTO: 0.69 10*3/MM3 (ref 0.1–0.9)
MONOCYTES NFR BLD AUTO: 10.4 % (ref 5–12)
NEUTROPHILS # BLD AUTO: 3.61 10*3/MM3 (ref 1.7–7)
NEUTROPHILS NFR BLD AUTO: 54.2 % (ref 42.7–76)
NRBC BLD AUTO-RTO: 0 /100 WBC (ref 0–0.2)
PLATELET # BLD AUTO: 260 10*3/MM3 (ref 140–450)
POTASSIUM SERPL-SCNC: 4.8 MMOL/L (ref 3.5–5.2)
PROT SERPL-MCNC: 6.9 G/DL (ref 6–8.5)
RBC # BLD AUTO: 4.87 10*6/MM3 (ref 3.77–5.28)
SODIUM SERPL-SCNC: 140 MMOL/L (ref 136–145)
TRIGL SERPL-MCNC: 103 MG/DL (ref 0–150)
TSH SERPL DL<=0.005 MIU/L-ACNC: 4.83 UIU/ML (ref 0.27–4.2)
VLDLC SERPL CALC-MCNC: 19 MG/DL (ref 5–40)
WBC # BLD AUTO: 6.66 10*3/MM3 (ref 3.4–10.8)

## 2023-05-26 DIAGNOSIS — E03.8 ADULT ONSET HYPOTHYROIDISM: ICD-10-CM

## 2023-05-26 RX ORDER — LEVOTHYROXINE SODIUM 112 UG/1
112 TABLET ORAL DAILY
Qty: 30 TABLET | Refills: 0 | Status: SHIPPED | OUTPATIENT
Start: 2023-05-26

## 2023-05-30 ENCOUNTER — TREATMENT (OUTPATIENT)
Dept: PHYSICAL THERAPY | Facility: CLINIC | Age: 88
End: 2023-05-30

## 2023-05-30 DIAGNOSIS — M54.50 ACUTE MIDLINE LOW BACK PAIN WITHOUT SCIATICA: Primary | ICD-10-CM

## 2023-05-31 NOTE — PROGRESS NOTES
Physical Therapy Daily Treatment Note  [address] [place] [address]      Patient: Fanta Rodney   : 1935  Referring practitioner: No ref. provider found  Date of Initial Visit: Type: THERAPY  Noted: 2023  Today's Date: 2023  Patient seen for 3 sessions         Visit Diagnoses:    ICD-10-CM ICD-9-CM   1. Acute midline low back pain without sciatica  M54.50 724.2       Subjective   Patient reports that her back is improving some but will still give her pain randomly but especially fist thing in the morning. Is being able to move easier. Exercises are good/      Objective   See Exercise, Manual, and Modality Logs for complete treatment.       Assessment/Plan  Pt tolerated treatment with minimal reports of increased pain mostly when she went too far with her standing extension exercises. Educated pt to keep all exercises symptom free. Pt also tolerated prone lying which she was encouraged to try at home as long as she can do it safely. Discussed use of LTR and SKTC prior to getting out of bed to aid with pain first thing in the morning. Continue with current POT progressing pt per tolerance.  Timed:         Manual Therapy:  16       mins  34448;     Therapeutic Exercise:   24      mins  90153;     Neuromuscular Janet:      mins  18287;    Therapeutic Activity:          mins  44906;     Gait Training:           mins  34040;     Ultrasound:          mins  36449;    Ionto                                   mins   89117  Self Care                            mins   30984  Canalith Repos         mins 17011      Un-Timed:  Electrical Stimulation:        mins  50837 ( );  Dry Needling          mins self-pay  Traction          mins 30972      Timed Treatment:   40   mins   Total Treatment:     40   mins    Gely Jin PT  [place] License: [id number]

## 2023-06-01 ENCOUNTER — TREATMENT (OUTPATIENT)
Dept: PHYSICAL THERAPY | Facility: CLINIC | Age: 88
End: 2023-06-01

## 2023-06-01 DIAGNOSIS — M54.50 ACUTE MIDLINE LOW BACK PAIN WITHOUT SCIATICA: Primary | ICD-10-CM

## 2023-06-01 NOTE — PROGRESS NOTES
Physical Therapy Daily Treatment Note  [address] [place] [address]      Patient: Fanta Rodney   : 1935  Referring practitioner: No ref. provider found  Date of Initial Visit: Type: THERAPY  Noted: 2023  Today's Date: 2023  Patient seen for 4 sessions         Visit Diagnoses:    ICD-10-CM ICD-9-CM   1. Acute midline low back pain without sciatica  M54.50 724.2       Subjective   Patient reports that the back has been pretty good over the past couple of days. Has only had to use her patches 1X. Able to lay on her stomach for 3-4 min.       Objective   See Exercise, Manual, and Modality Logs for complete treatment.   Observation: pt moves much better without obvious signs of pain    Assessment/Plan  Pt tolerated treatment without report of increased pain. Educated pt to perform 1 more round of prone lying and then if she does not have any more pain she can just use the standing extension exercises. Encouraged pt to continue with gentle mobility exercises even once her back pain is gone. Discussed return to short bouts of walking to make sure she is able to return to all the things she was doing prior to her injury. Continue with current POT progressing pt per tolerance.     Timed:         Manual Therapy:  18       mins  35908;     Therapeutic Exercise:  23       mins  66282;     Neuromuscular Janet:      mins  22408;    Therapeutic Activity:          mins  33158;     Gait Training:           mins  39306;     Ultrasound:          mins  74261;    Ionto                                   mins   89851  Self Care                            mins   23795  Canalith Repos         mins 27830      Un-Timed:  Electrical Stimulation:        mins  22156 ( );  Dry Needling          mins self-pay  Traction          mins 65132      Timed Treatment:   41   mins   Total Treatment:     41   mins    Gely Jin PT  [place] License: [id number]

## 2023-06-06 ENCOUNTER — TREATMENT (OUTPATIENT)
Dept: PHYSICAL THERAPY | Facility: CLINIC | Age: 88
End: 2023-06-06
Payer: MEDICARE

## 2023-06-06 DIAGNOSIS — M54.50 ACUTE MIDLINE LOW BACK PAIN WITHOUT SCIATICA: Primary | ICD-10-CM

## 2023-06-06 NOTE — PROGRESS NOTES
Physical Therapy Daily Treatment Note  [address] [place] [address]      Patient: Fanta Rodney   : 1935  Referring practitioner: No ref. provider found  Date of Initial Visit: Type: THERAPY  Noted: 2023  Today's Date: 2023  Patient seen for 5 sessions         Visit Diagnoses:    ICD-10-CM ICD-9-CM   1. Acute midline low back pain without sciatica  M54.50 724.2       Subjective   Patient reports was doing better but took a walk and overdid it. Also sat on a hard chair for awhile and the pain worsened but not to the level it was. Pain even increased into the thigh which it had not done in awhile. Has been doing the laying on her stomach exercises every day and the standing extension exercises which have been helping.      Objective   See Exercise, Manual, and Modality Logs for complete treatment.     Assessment/Plan  Pt tolerated treatment with minimal reports of pain mostly with walking initially. Pt was cued for core engagement and standing up straight which aided the pt's pain. Educated pt to continue working on her core strength and activating it at times when she gets pain. Discussed slow progression of walking. Pt wants to place her chart on hold at this time to work on HEP. If pt does not call for an appt in 30 days DC chart.    Timed:         Manual Therapy:  13       mins  00906;     Therapeutic Exercise:    28     mins  82621;     Neuromuscular Janet:      mins  59676;    Therapeutic Activity:          mins  97845;     Gait Training:           mins  36342;     Ultrasound:          mins  05814;    Ionto                                   mins   51511  Self Care                            mins   26879  Canalith Repos         mins 20169      Un-Timed:  Electrical Stimulation:        mins  53076 ( );  Dry Needling          mins self-pay  Traction          mins 24066      Timed Treatment:   41   mins   Total Treatment:     41   mins    Gely Jin, PT  [place]  License: [id number]

## 2023-06-22 LAB — TSH SERPL DL<=0.005 MIU/L-ACNC: 0.51 UIU/ML (ref 0.27–4.2)

## 2023-10-18 ENCOUNTER — OFFICE VISIT (OUTPATIENT)
Dept: INTERNAL MEDICINE | Facility: CLINIC | Age: 88
End: 2023-10-18
Payer: MEDICARE

## 2023-10-18 VITALS
BODY MASS INDEX: 21.79 KG/M2 | TEMPERATURE: 97.8 F | SYSTOLIC BLOOD PRESSURE: 108 MMHG | WEIGHT: 123 LBS | DIASTOLIC BLOOD PRESSURE: 75 MMHG | HEIGHT: 63 IN | HEART RATE: 96 BPM | RESPIRATION RATE: 16 BRPM | OXYGEN SATURATION: 95 %

## 2023-10-18 DIAGNOSIS — R63.4 WEIGHT LOSS: ICD-10-CM

## 2023-10-18 DIAGNOSIS — R19.7 DIARRHEA, UNSPECIFIED TYPE: ICD-10-CM

## 2023-10-18 DIAGNOSIS — Z23 NEED FOR INFLUENZA VACCINATION: ICD-10-CM

## 2023-10-18 DIAGNOSIS — E03.8 ADULT ONSET HYPOTHYROIDISM: ICD-10-CM

## 2023-10-18 DIAGNOSIS — Z12.11 COLON CANCER SCREENING: ICD-10-CM

## 2023-10-18 DIAGNOSIS — E78.2 MIXED HYPERLIPIDEMIA: Primary | ICD-10-CM

## 2023-10-18 RX ORDER — SENNOSIDES 8.6 MG
650 CAPSULE ORAL EVERY 8 HOURS PRN
COMMUNITY

## 2023-10-18 RX ORDER — LEVOTHYROXINE SODIUM 112 UG/1
112 TABLET ORAL DAILY
Qty: 90 TABLET | Refills: 2 | Status: SHIPPED | OUTPATIENT
Start: 2023-10-18 | End: 2023-10-19 | Stop reason: SDUPTHER

## 2023-10-18 RX ORDER — PHENOL 1.4 %
600 AEROSOL, SPRAY (ML) MUCOUS MEMBRANE DAILY
COMMUNITY

## 2023-10-18 NOTE — PROGRESS NOTES
"Chief Complaint  Hyperlipidemia (fasting) and Hypothyroidism    Subjective        Fanta Rodney presents to [facility]  HPI:  The patient is also here to follow up on the cholesterol and   to follow up on thyroid and is  due to get lab work done .  The patient also needs refills on medications  and flu shot.  She complains of loose stools during the day and frequently, she has been taking immodium 2 tabs daily , denies any blood in stools, last colonoscopy 2013 , she is noted to have lost nearly 8 lbs   Hyperlipidemia   Pertinent negatives include no chest pain or shortness of breath.        Hyperlipidemia        Objective   Vital Signs:  /75   Pulse 96   Temp 97.8 °F (36.6 °C)   Resp 16   Ht 160 cm (62.99\")   Wt 55.8 kg (123 lb)   SpO2 95%   BMI 21.79 kg/m²   Estimated body mass index is 21.79 kg/m² as calculated from the following:    Height as of this encounter: 160 cm (62.99\").    Weight as of this encounter: 55.8 kg (123 lb).       BMI is within normal parameters. No other follow-up for BMI required.      Physical Exam  Vitals and nursing note reviewed.   Constitutional:       General: She is not in acute distress.     Appearance: Normal appearance. She is not diaphoretic.   HENT:      Head: Normocephalic and atraumatic.      Right Ear: External ear normal.      Left Ear: External ear normal.      Nose: Nose normal.   Eyes:      Extraocular Movements: Extraocular movements intact.      Conjunctiva/sclera: Conjunctivae normal.   Neck:      Trachea: Trachea normal.   Cardiovascular:      Rate and Rhythm: Normal rate and regular rhythm.      Heart sounds: Normal heart sounds.   Pulmonary:      Effort: Pulmonary effort is normal. No respiratory distress.      Breath sounds: Normal breath sounds.   Abdominal:      General: Abdomen is flat.   Musculoskeletal:      Cervical back: Neck supple.      Comments: Moves all limbs   Skin:     General: Skin is warm. "   Neurological:      Mental Status: She is alert and oriented to person, place, and time.      Comments: No gross motor or sensory deficits        Result Review :  The following data was reviewed by: Tiera Freed MD on 10/18/2023:  Common labs          12/1/2022    11:52 5/23/2023    08:35   Common Labs   Glucose 89  93    BUN 15  14    Creatinine 0.87  1.04    Sodium 143  140    Potassium 4.6  4.8    Chloride 106  105    Calcium 9.7  9.9    Total Protein 7.2  6.9    Albumin 4.20  4.0    Total Bilirubin 0.6  0.6    Alkaline Phosphatase 70  85    AST (SGOT) 19  17    ALT (SGPT) 10  14    WBC 6.72  6.66    Hemoglobin 14.7  14.7    Hematocrit 45.2  45.1    Platelets 249  260    Total Cholesterol 182  175    Triglycerides 123  103    HDL Cholesterol 60  57    LDL Cholesterol  100  99                   Assessment and Plan   Diagnoses and all orders for this visit:    1. Mixed hyperlipidemia (Primary)  -     CBC & Differential  -     Comprehensive Metabolic Panel  -     Lipid Panel    2. Adult onset hypothyroidism  -     levothyroxine (SYNTHROID, LEVOTHROID) 112 MCG tablet; Take 1 tablet by mouth Daily.  Dispense: 90 tablet; Refill: 2  -     TSH    3. Weight loss  -     Ambulatory Referral to Gastroenterology    4. Diarrhea, unspecified type  -     Ambulatory Referral to Gastroenterology    5. Colon cancer screening  -     Ambulatory Referral to Gastroenterology    6. Need for influenza vaccination  -     Fluzone High-Dose 65+yrs ([id number])      Plan:  1.   mixed hyperlipidemia: will obtain   fasting CMP and lipid panel.  Diet and exercise counseled,    2. hypothyroidism: will obtain tsh , and continue levothyroxine  3.  Weight loss: We will refer patient to GI  4.  Diarrhea: We will refer patient to GI and obtain labs  5.  Colon cancer screening: We will refer patient to GI for colonoscopy  6. Need for flu vaccine : given today and well tolerated       I spent 30 minutes caring for Fanta on this date of service. This  time includes time spent by me in the following activities:preparing for the visit, reviewing tests, performing a medically appropriate examination and/or evaluation , counseling and educating the patient/family/caregiver, ordering medications, tests, or procedures, and documenting information in the medical record  Follow Up   Return in about 3 months (around 1/29/2024).  Patient was given instructions and counseling regarding her condition or for health maintenance advice. Please see specific information pulled into the AVS if appropriate.

## 2023-10-19 DIAGNOSIS — E03.8 ADULT ONSET HYPOTHYROIDISM: ICD-10-CM

## 2023-10-19 LAB
ALBUMIN SERPL-MCNC: 4.5 G/DL (ref 3.5–5.2)
ALBUMIN/GLOB SERPL: 1.7 G/DL
ALP SERPL-CCNC: 74 U/L (ref 39–117)
ALT SERPL-CCNC: 12 U/L (ref 1–33)
AST SERPL-CCNC: 18 U/L (ref 1–32)
BASOPHILS # BLD AUTO: 0.08 10*3/MM3 (ref 0–0.2)
BASOPHILS NFR BLD AUTO: 1.2 % (ref 0–1.5)
BILIRUB SERPL-MCNC: 0.7 MG/DL (ref 0–1.2)
BUN SERPL-MCNC: 19 MG/DL (ref 8–23)
BUN/CREAT SERPL: 20.9 (ref 7–25)
CALCIUM SERPL-MCNC: 10 MG/DL (ref 8.6–10.5)
CHLORIDE SERPL-SCNC: 103 MMOL/L (ref 98–107)
CHOLEST SERPL-MCNC: 210 MG/DL (ref 0–200)
CO2 SERPL-SCNC: 27 MMOL/L (ref 22–29)
CREAT SERPL-MCNC: 0.91 MG/DL (ref 0.57–1)
EGFRCR SERPLBLD CKD-EPI 2021: 61.6 ML/MIN/1.73
EOSINOPHIL # BLD AUTO: 0.21 10*3/MM3 (ref 0–0.4)
EOSINOPHIL NFR BLD AUTO: 3.1 % (ref 0.3–6.2)
ERYTHROCYTE [DISTWIDTH] IN BLOOD BY AUTOMATED COUNT: 12.7 % (ref 12.3–15.4)
GLOBULIN SER CALC-MCNC: 2.6 GM/DL
GLUCOSE SERPL-MCNC: 91 MG/DL (ref 65–99)
HCT VFR BLD AUTO: 42.7 % (ref 34–46.6)
HDLC SERPL-MCNC: 57 MG/DL (ref 40–60)
HGB BLD-MCNC: 14.4 G/DL (ref 12–15.9)
IMM GRANULOCYTES # BLD AUTO: 0.02 10*3/MM3 (ref 0–0.05)
IMM GRANULOCYTES NFR BLD AUTO: 0.3 % (ref 0–0.5)
LDLC SERPL CALC-MCNC: 133 MG/DL (ref 0–100)
LYMPHOCYTES # BLD AUTO: 1.86 10*3/MM3 (ref 0.7–3.1)
LYMPHOCYTES NFR BLD AUTO: 27.8 % (ref 19.6–45.3)
MCH RBC QN AUTO: 31.5 PG (ref 26.6–33)
MCHC RBC AUTO-ENTMCNC: 33.7 G/DL (ref 31.5–35.7)
MCV RBC AUTO: 93.4 FL (ref 79–97)
MONOCYTES # BLD AUTO: 0.63 10*3/MM3 (ref 0.1–0.9)
MONOCYTES NFR BLD AUTO: 9.4 % (ref 5–12)
NEUTROPHILS # BLD AUTO: 3.88 10*3/MM3 (ref 1.7–7)
NEUTROPHILS NFR BLD AUTO: 58.2 % (ref 42.7–76)
NRBC BLD AUTO-RTO: 0 /100 WBC (ref 0–0.2)
PLATELET # BLD AUTO: 250 10*3/MM3 (ref 140–450)
POTASSIUM SERPL-SCNC: 4.4 MMOL/L (ref 3.5–5.2)
PROT SERPL-MCNC: 7.1 G/DL (ref 6–8.5)
RBC # BLD AUTO: 4.57 10*6/MM3 (ref 3.77–5.28)
SODIUM SERPL-SCNC: 139 MMOL/L (ref 136–145)
TRIGL SERPL-MCNC: 115 MG/DL (ref 0–150)
TSH SERPL DL<=0.005 MIU/L-ACNC: 0.18 UIU/ML (ref 0.27–4.2)
VLDLC SERPL CALC-MCNC: 20 MG/DL (ref 5–40)
WBC # BLD AUTO: 6.68 10*3/MM3 (ref 3.4–10.8)

## 2023-10-19 RX ORDER — LEVOTHYROXINE SODIUM 0.1 MG/1
100 TABLET ORAL DAILY
Qty: 30 TABLET | Refills: 0 | Status: SHIPPED | OUTPATIENT
Start: 2023-10-19

## 2023-10-30 ENCOUNTER — OFFICE VISIT (OUTPATIENT)
Dept: GASTROENTEROLOGY | Facility: CLINIC | Age: 88
End: 2023-10-30
Payer: MEDICARE

## 2023-10-30 VITALS
BODY MASS INDEX: 21.79 KG/M2 | OXYGEN SATURATION: 94 % | RESPIRATION RATE: 18 BRPM | DIASTOLIC BLOOD PRESSURE: 80 MMHG | WEIGHT: 123 LBS | HEIGHT: 63 IN | HEART RATE: 84 BPM | SYSTOLIC BLOOD PRESSURE: 122 MMHG

## 2023-10-30 DIAGNOSIS — Z80.0 FAMILY HISTORY OF COLON CANCER IN MOTHER: ICD-10-CM

## 2023-10-30 DIAGNOSIS — R19.5 LOOSE STOOLS: ICD-10-CM

## 2023-10-30 DIAGNOSIS — R63.4 WEIGHT LOSS: Primary | ICD-10-CM

## 2023-10-30 DIAGNOSIS — Z12.11 ENCOUNTER FOR SCREENING FOR MALIGNANT NEOPLASM OF COLON: ICD-10-CM

## 2023-10-30 PROCEDURE — 99213 OFFICE O/P EST LOW 20 MIN: CPT | Performed by: NURSE PRACTITIONER

## 2023-10-30 PROCEDURE — 1160F RVW MEDS BY RX/DR IN RCRD: CPT | Performed by: NURSE PRACTITIONER

## 2023-10-30 PROCEDURE — 1159F MED LIST DOCD IN RCRD: CPT | Performed by: NURSE PRACTITIONER

## 2023-10-30 NOTE — PATIENT INSTRUCTIONS
Continue to avoid all dairy. May use lactose free/dairy free alternatives such as almond milk, rice milk, oat milk, etc.   Discussed colonoscopy and/or EGD. Patient wants to wait at this time. Will call back if she wishes to schedule.  Follow up: 3 months

## 2023-10-30 NOTE — PROGRESS NOTES
New Patient Consult      Date: 10/30/2023   Patient Name: Fanta Rodney  MRN: [id number]  : 1935     Primary Care Provider: Tiera Freed MD    Chief Complaint   Patient presents with    Weight Loss     New Patient     History of Present Illness: Fanta Rodney is a 88 y.o. female who is here today to establish care with gastroenterology for weight loss.     She had lost about 9 pounds unintentionally over 2-3 months. She had her thyroid medication increased from 110 mcg to 112 mcg and she started losing weight. She had the dose decreased back to 110 mcg and her weight has been stable, no further weight loss. She had some loose stools, but she cut out dairy and she has not had any further episodes of loose stool. She is having 2 soft bowel movements per day. She denies any abdominal pain, nausea or vomiting. Denies reflux or difficulty swallowing. Denies any GI bleeding. Her last colonoscopy was 2013 and was normal per patient. She states she has never had polyps in the past. Her last EGD was 2013 and was normal per patient.     Her mother had colon cancer in her late 80's. No other family history of GI malignancy.     Subjective      Past Medical History:   Diagnosis Date    Anxiety     Arthritis     Bilateral cataracts     HISTORY OF HAS HAD REMOVED    Body piercing     EARS ONLY    Choledocholithiasis     Disease of thyroid gland     GERD (gastroesophageal reflux disease)     History of esophagogastroduodenoscopy (EGD) 2013    History of toe fracture 10/2012    Left middle toe    Murmur     Nausea with vomiting     Osteoarthritis     Stress fracture of metatarsal bone 2013    Left Foot - Treated by Dr. Manley    Tear of meniscus of knee     Urinary tract infection     Wears glasses     Wears partial dentures     LOWER ONLY     Past Surgical History:   Procedure Laterality Date    ADENOIDECTOMY      CATARACT EXTRACTION Right 2016    Dr. Michele Damian    CATARACT EXTRACTION  Left 09/29/2016    Dr. Michele Damian     CHOLECYSTECTOMY  09/27/2013    Dr. Alexis Lobo    COLONOSCOPY  02/08/2013    Dr. Alexis Gary    DILATION AND CURETTAGE, DIAGNOSTIC / THERAPEUTIC  1967    ENDOSCOPY      FINGER SURGERY Right 08/17/2006    2 fingers - joint replacement - Dr. Suh - [place], [place]    JOINT REPLACEMENT  Aug. 17,2006-July24,2018    Two fingers in right hand-Knee replacement    THYROIDECTOMY  10/09/1967    Dr. Christiano Beard [place] - Diseased Thyroid (Goiter)    TONSILLECTOMY      TOTAL KNEE ARTHROPLASTY Right 07/24/2018    Procedure: Elective right total knee replacement (BIOMET);  Surgeon: Oscar Snow MD;  Location: [facility];  Service: Orthopedics     Family History   Problem Relation Age of Onset    Colon cancer Mother         diagnosed in her late 80's    Cancer Mother     Osteoarthritis Mother     Cancer Father     Prostate cancer Father      Social History     Socioeconomic History    Marital status:    Tobacco Use    Smoking status: Never    Smokeless tobacco: Never   Vaping Use    Vaping Use: Never used   Substance and Sexual Activity    Alcohol use: Yes     Comment: OCCASIONALLY    Drug use: No    Sexual activity: Never       Current Outpatient Medications:     acetaminophen (TYLENOL) 650 MG 8 hr tablet, Take 1 tablet by mouth Every 8 (Eight) Hours As Needed for Mild Pain., Disp: , Rfl:     Ascorbic Acid (VITAMIN C) 500 MG capsule, Take  by mouth daily., Disp: , Rfl:     Calcium 200 MG tablet, Take  by mouth., Disp: , Rfl:     calcium carbonate (OS-MIRZA) 600 MG tablet, Take 1 tablet by mouth Daily., Disp: , Rfl:     Calcium Carbonate-Vitamin D (CALCIUM-D PO), Take  by mouth., Disp: , Rfl:     levothyroxine (SYNTHROID, LEVOTHROID) 100 MCG tablet, Take 1 tablet by mouth Daily., Disp: 30 tablet, Rfl: 0    lidocaine (Lidoderm) 5 %, Place 1 patch on the skin as directed by provider Daily. Remove & Discard patch within 12 hours or as directed by MD, Disp: 15 each, Rfl: 0     "TURMERIC PO, Take 1,000 mg by mouth Daily., Disp: , Rfl:      Allergies   Allergen Reactions    Tomato Swelling     Fresh tomatoes.     Nexium [Esomeprazole Magnesium] GI Intolerance     The following portions of the patient's history were reviewed and updated as appropriate: allergies, current medications, past family history, past medical history, past social history, past surgical history and problem list.    Objective     Physical Exam  Vitals and nursing note reviewed.   Constitutional:       General: She is not in acute distress.     Appearance: Normal appearance. She is well-developed.   HENT:      Head: Normocephalic and atraumatic.      Mouth/Throat:      Mouth: Mucous membranes are not pale, not dry and not cyanotic.   Eyes:      General: Lids are normal.   Neck:      Trachea: Trachea normal.   Cardiovascular:      Rate and Rhythm: Normal rate.   Pulmonary:      Effort: Pulmonary effort is normal. No respiratory distress.      Breath sounds: Normal breath sounds.   Abdominal:      General: Bowel sounds are normal.      Palpations: Abdomen is soft. There is no mass.      Tenderness: There is no abdominal tenderness.      Hernia: No hernia is present.   Skin:     General: Skin is warm and dry.   Neurological:      Mental Status: She is alert and oriented to person, place, and time.   Psychiatric:         Mood and Affect: Mood normal.         Speech: Speech normal.         Behavior: Behavior normal. Behavior is cooperative.       Vitals:    10/30/23 0837   BP: 122/80   Pulse: 84   Resp: 18   SpO2: 94%   Weight: 55.8 kg (123 lb)   Height: 160 cm (63\")     Body mass index is 21.79 kg/m².     Results Review:   I have reviewed the patient's new clinical and imaging results.    Office Visit on 10/18/2023   Component Date Value Ref Range Status    WBC 10/18/2023 6.68  3.40 - 10.80 10*3/mm3 Final    RBC 10/18/2023 4.57  3.77 - 5.28 10*6/mm3 Final    Hemoglobin 10/18/2023 14.4  12.0 - 15.9 g/dL Final    Hematocrit " 10/18/2023 42.7  34.0 - 46.6 % Final    MCV 10/18/2023 93.4  79.0 - 97.0 fL Final    MCH 10/18/2023 31.5  26.6 - 33.0 pg Final    MCHC 10/18/2023 33.7  31.5 - 35.7 g/dL Final    RDW 10/18/2023 12.7  12.3 - 15.4 % Final    Platelets 10/18/2023 250  140 - 450 10*3/mm3 Final    Glucose 10/18/2023 91  65 - 99 mg/dL Final    BUN 10/18/2023 19  8 - 23 mg/dL Final    Creatinine 10/18/2023 0.91  0.57 - 1.00 mg/dL Final    EGFR Result 10/18/2023 61.6  >60.0 mL/min/1.73 Final    BUN/Creatinine Ratio 10/18/2023 20.9  7.0 - 25.0 Final    Sodium 10/18/2023 139  136 - 145 mmol/L Final    Potassium 10/18/2023 4.4  3.5 - 5.2 mmol/L Final    Chloride 10/18/2023 103  98 - 107 mmol/L Final    Total CO2 10/18/2023 27.0  22.0 - 29.0 mmol/L Final    Calcium 10/18/2023 10.0  8.6 - 10.5 mg/dL Final    Total Protein 10/18/2023 7.1  6.0 - 8.5 g/dL Final    Albumin 10/18/2023 4.5  3.5 - 5.2 g/dL Final    Globulin 10/18/2023 2.6  gm/dL Final    A/G Ratio 10/18/2023 1.7  g/dL Final    Total Bilirubin 10/18/2023 0.7  0.0 - 1.2 mg/dL Final    Alkaline Phosphatase 10/18/2023 74  39 - 117 U/L Final    AST (SGOT) 10/18/2023 18  1 - 32 U/L Final    ALT (SGPT) 10/18/2023 12  1 - 33 U/L Final    Total Cholesterol 10/18/2023 210 (H)  0 - 200 mg/dL Final    Triglycerides 10/18/2023 115  0 - 150 mg/dL Final    HDL Cholesterol 10/18/2023 57  40 - 60 mg/dL Final    VLDL Cholesterol Edgar 10/18/2023 20  5 - 40 mg/dL Final    LDL Chol Calc (RUST) 10/18/2023 133 (H)  0 - 100 mg/dL Final    TSH 10/18/2023 0.181 (L)  0.270 - 4.200 uIU/mL Final      No radiology results for the last 90 days.     Assessment / Plan      1. Weight loss  2. Loose stools  3. Encounter for screening for malignant neoplasm of colon  4. Family history of colon cancer in mother  She has lost about 9 pounds unintentionally over 2 to 3 months.  Weight loss started after adjustment of her thyroid medication. Once her medication dose was adjusted back, her weight has been stable according to the  patient. She had some loose stools, but cut out dairy and this has resolved.  She currently has 2 soft bowel movements per day.  Denies any abdominal pain, nausea or vomiting.  Denies any reflux or difficulty swallowing.  Denies any GI bleeding.  Basic labs unremarkable.  TSH decreased at 0.181 on 10/18/2023.  No abdominal imaging to interpret.  Her last colonoscopy and EGD were in 2013 and were normal according to the patient, we do not have those results.  Her mother had colon cancer in her late 80s.  Continue to avoid all dairy.  Colonoscopy for colon cancer screening/evaluation of weight loss/loose stools.  Patient wishes to wait at this time.  EGD for evaluation of weight loss.  Patient wishes to wait at this time.    Patient Instructions   Continue to avoid all dairy. May use lactose free/dairy free alternatives such as almond milk, rice milk, oat milk, etc.   Discussed colonoscopy and/or EGD. Patient wants to wait at this time. Will call back if she wishes to schedule.  Follow up: 3 months      Ernie Carson, BRIGID  10/30/2023    Please note that portions of this note may have been completed with a voice recognition program.

## 2023-11-03 ENCOUNTER — PATIENT ROUNDING (BHMG ONLY) (OUTPATIENT)
Dept: GASTROENTEROLOGY | Facility: CLINIC | Age: 88
End: 2023-11-03
Payer: MEDICARE

## 2023-12-04 ENCOUNTER — OFFICE VISIT (OUTPATIENT)
Dept: INTERNAL MEDICINE | Facility: CLINIC | Age: 88
End: 2023-12-04
Payer: MEDICARE

## 2023-12-04 VITALS
WEIGHT: 122 LBS | BODY MASS INDEX: 21.62 KG/M2 | HEART RATE: 92 BPM | HEIGHT: 63 IN | RESPIRATION RATE: 16 BRPM | DIASTOLIC BLOOD PRESSURE: 75 MMHG | TEMPERATURE: 98.2 F | OXYGEN SATURATION: 98 % | SYSTOLIC BLOOD PRESSURE: 108 MMHG

## 2023-12-04 DIAGNOSIS — Z13.820 ENCOUNTER FOR SCREENING FOR OSTEOPOROSIS: ICD-10-CM

## 2023-12-04 DIAGNOSIS — Z91.81 AT MODERATE RISK FOR FALL: ICD-10-CM

## 2023-12-04 DIAGNOSIS — E03.8 ADULT ONSET HYPOTHYROIDISM: ICD-10-CM

## 2023-12-04 DIAGNOSIS — Z78.0 MENOPAUSE: ICD-10-CM

## 2023-12-04 DIAGNOSIS — Z00.00 MEDICARE ANNUAL WELLNESS VISIT, SUBSEQUENT: Primary | ICD-10-CM

## 2023-12-04 DIAGNOSIS — E78.2 MIXED HYPERLIPIDEMIA: ICD-10-CM

## 2023-12-04 PROCEDURE — 1159F MED LIST DOCD IN RCRD: CPT | Performed by: INTERNAL MEDICINE

## 2023-12-04 PROCEDURE — G0439 PPPS, SUBSEQ VISIT: HCPCS | Performed by: INTERNAL MEDICINE

## 2023-12-04 PROCEDURE — 1160F RVW MEDS BY RX/DR IN RCRD: CPT | Performed by: INTERNAL MEDICINE

## 2023-12-04 RX ORDER — MELATONIN
1000 DAILY
COMMUNITY

## 2023-12-04 NOTE — PATIENT INSTRUCTIONS
Advance Care Planning and Advance Directives     You make decisions on a daily basis - decisions about where you want to live, your career, your home, your life. Perhaps one of the most important decisions you face is your choice for future medical care. Take time to talk with your family and your healthcare team and start planning today.  Advance Care Planning is a process that can help you:  Understand possible future healthcare decisions in light of your own experiences  Reflect on those decision in light of your goals and values  Discuss your decisions with those closest to you and the healthcare professionals that care for you  Make a plan by creating a document that reflects your wishes    Surrogate Decision Maker  In the event of a medical emergency, which has left you unable to communicate or to make your own decisions, you would need someone to make decisions for you.  It is important to discuss your preferences for medical treatment with this person while you are in good health.     Qualities of a surrogate decision maker:  Willing to take on this role and responsibility  Knows what you want for future medical care  Willing to follow your wishes even if they don't agree with them  Able to make difficult medical decisions under stressful circumstances    Advance Directives  These are legal documents you can create that will guide your healthcare team and decision maker(s) when needed. These documents can be stored in the electronic medical record.    Living Will - a legal document to guide your care if you have a terminal condition or a serious illness and are unable to communicate. States vary by statute in document names/types, but most forms may include one or more of the following:        -  Directions regarding life-prolonging treatments        -  Directions regarding artificially provided nutrition/hydration        -  Choosing a healthcare decision maker        -  Direction regarding organ/tissue  donation    Durable Power of  for Healthcare - this document names an -in-fact to make medical decisions for you, but it may also allow this person to make personal and financial decisions for you. Please seek the advice of an  if you need this type of document.    **Advance Directives are not required and no one may discriminate against you if you do not sign one.    Medical Orders  Many states allow specific forms/orders signed by your physician to record your wishes for medical treatment in your current state of health. This form, signed in personal communication with your physician, addresses resuscitation and other medical interventions that you may or may not want.      For more information or to schedule a time with a [facility] Advance Care Planning Facilitator contact: [facility]/ACP or call [phone] and someone will contact you directly.  Fall Prevention in the Home, Adult  Falls can cause injuries and affect people of all ages. There are many simple things that you can do to make your home safe and to help prevent falls. Ask for help when making these changes, if needed.  What actions can I take to prevent falls?  General instructions  Use good lighting in all rooms. Replace any light bulbs that burn out, turn on lights if it is dark, and use night-lights.  Place frequently used items in easy-to-reach places. Lower the shelves around your home if necessary.  Set up furniture so that there are clear paths around it. Avoid moving your furniture around.  Remove throw rugs and other tripping hazards from the floor.  Avoid walking on wet floors.  Fix any uneven floor surfaces.  Add color or contrast paint or tape to grab bars and handrails in your home. Place contrasting color strips on the first and last steps of staircases.  When you use a stepladder, make sure that it is completely opened and that the sides and supports are firmly locked. Have someone hold the ladder  while you are using it. Do not climb a closed stepladder.  Know where your pets are when moving through your home.  What can I do in the bathroom?         Keep the floor dry. Immediately clean up any water that is on the floor.  Remove soap buildup in the tub or shower regularly.  Use nonskid mats or decals on the floor of the tub or shower.  Attach bath mats securely with double-sided, nonslip rug tape.  If you need to sit down while you are in the shower, use a plastic, nonslip stool.  Install grab bars by the toilet and in the tub and shower. Do not use towel bars as grab bars.  What can I do in the bedroom?  Make sure that a bedside light is easy to reach.  Do not use oversized bedding that reaches the floor.  Have a firm chair that has side arms to use for getting dressed.  What can I do in the kitchen?  Clean up any spills right away.  If you need to reach for something above you, use a sturdy step stool that has a grab bar.  Keep electrical cables out of the way.  Do not use floor polish or wax that makes floors slippery. If you must use wax, make sure that it is non-skid floor wax.  What can I do with my stairs?  Do not leave any items on the stairs.  Make sure that you have a light switch at the top and the bottom of the stairs. Have them installed if you do not have them.  Make sure that there are handrails on both sides of the stairs. Fix handrails that are broken or loose. Make sure that handrails are as long as the staircases.  Install non-slip stair treads on all stairs in your home.  Avoid having throw rugs at the top or bottom of stairs, or secure the rugs with carpet tape to prevent them from moving.  Choose a carpet design that does not hide the edge of steps on the stairs.  Check any carpeting to make sure that it is firmly attached to the stairs. Fix any carpet that is loose or worn.  What can I do on the outside of my home?  Use bright outdoor lighting.  Regularly repair the edges of walkways  and driveways and fix any cracks.  Remove high doorway thresholds.  Trim any shrubbery on the main path into your home.  Regularly check that handrails are securely fastened and in good repair. Both sides of all steps should have handrails.  Install guardrails along the edges of any raised decks or porches.  Clear walkways of debris and clutter, including tools and rocks.  Have leaves, snow, and ice cleared regularly.  Use sand or salt on walkways during winter months.  In the garage, clean up any spills right away, including grease or oil spills.  What other actions can I take?  Wear closed-toe shoes that fit well and support your feet. Wear shoes that have rubber soles or low heels.  Use mobility aids as needed, such as canes, walkers, scooters, and crutches.  Review your medicines with your health care provider. Some medicines can cause dizziness or changes in blood pressure, which increase your risk of falling.  Talk with your health care provider about other ways that you can decrease your risk of falls. This may include working with a physical therapist or  to improve your strength, balance, and endurance.  Where to find more information  Centers for Disease Control and Prevention, [place]: www.cdc.gov  [place] on Aging: www.pedro.nih.gov  Contact a health care provider if:  You are afraid of falling at home.  You feel weak, drowsy, or dizzy at home.  You fall at home.  Summary  There are many simple things that you can do to make your home safe and to help prevent falls.  Ways to make your home safe include removing tripping hazards and installing grab bars in the bathroom.  Ask for help when making these changes in your home.  This information is not intended to replace advice given to you by your health care provider. Make sure you discuss any questions you have with your health care provider.  Document Revised: 09/19/2022 Document Reviewed: 07/21/2021  Elsevier Patient Education © 2023  Elsevier Inc.    Sit-to-Stand Exercise    The sit-to-stand exercise (also known as the chair stand or chair rise exercise) strengthens your lower body and helps you maintain or improve your mobility and independence. The end goal is to do the sit-to-stand exercise without using your hands. This will be easier as you become stronger. You should always talk with your health care provider before starting any exercise program, especially if you have had recent surgery.  Do the exercise exactly as told by your health care provider and adjust it as directed. It is normal to feel mild stretching, pulling, tightness, or discomfort as you do this exercise, but you should stop right away if you feel sudden pain or your pain gets worse. Do not begin doing this exercise until told by your health care provider.  What the sit-to-stand exercise does  The sit-to-stand exercise helps to strengthen the muscles in your thighs and the muscles in the center of your body that give you stability (core muscles). This exercise is especially helpful if:  You have had knee or hip surgery.  You have trouble getting up from a chair, out of a car, or off the toilet due to muscle weakness.  How to do the sit-to-stand exercise  Sit toward the front edge of a sturdy chair without armrests. Your knees should be bent and your feet should be flat on the floor and shoulder-width apart and underneath your hips.  Place your hands lightly on each side of the seat. Keep your back and neck as straight as possible, with your chest slightly forward.  Breathe in slowly. Lean forward and slightly shift your weight to the front of your feet.  Breathe out as you slowly stand up. Try not to support any weight with your hands.  Stand and pause for a full breath in and out.  Breathe in as you sit down slowly. Tighten your core and abdominal muscles to control your lowering as much as possible. You should lower yourself back to the chair slowly, not just drop back  into the seat.  Breathe out slowly.  Do this exercise 10-15 times. If needed, do it fewer times until you build up strength.  Rest for 1 minute, then do another set of 10-15 repetitions.  To change the difficulty of the sit-to-stand exercise  If the exercise is too difficult, use a chair with sturdy armrests, and push off the armrests to help you come to the standing position. You can also use the armrests to help slowly lower yourself back to sitting. As this gets easier, try to use your arms less. You can also place a firm cushion or pillow on the chair to make the surface higher.  If this exercise is too easy, do not use your arms to help raise or lower yourself. You can also wear a weighted vest, use hand weights, increase your repetitions, or try a lower chair.  General tips  You may feel tired when starting an exercise routine. This is normal.  You may have muscle soreness that lasts a few days. This is normal. As you get stronger, you may not feel muscle soreness.  Use smooth, steady movements.  Do not  hold your breath during strength exercises. This can cause unsafe changes in your blood pressure.  Breathe in slowly through your nose, and breathe out slowly through your mouth.  Summary  Strengthening your lower body is an important step to help you move safely and independently.  The sit-to-stand exercise helps strengthen the muscles in your thighs and core.  You should always talk with your health care provider before starting any exercise program, especially if you have had recent surgery.  This information is not intended to replace advice given to you by your health care provider. Make sure you discuss any questions you have with your health care provider.  Document Revised: 04/10/2022 Document Reviewed: 04/10/2022  Elsevier Patient Education © 2023 [employer]      Medicare Wellness  Personal Prevention Plan of Service     Date of Office Visit:    Encounter Provider:  Tiera Freed MD  Place of  Service:  [facility]  Patient Name: Fanta Rodney  :  1935    As part of the Medicare Wellness portion of your visit today, we are providing you with this personalized preventive plan of services (PPPS). This plan is based upon recommendations of the [place] Preventive Services Task Force (USPSTF) and the Advisory Committee on Immunization Practices (ACIP).    This lists the preventive care services that should be considered, and provides dates of when you are due. Items listed as completed are up-to-date and do not require any further intervention.    Health Maintenance   Topic Date Due   • DXA SCAN  2015   • COVID-19 Vaccine ( season) 10/28/2024 (Originally 2023)   • LIPID PANEL  10/18/2024   • ANNUAL WELLNESS VISIT  2024   • INFLUENZA VACCINE  Completed   • Pneumococcal Vaccine 65+  Completed   • ZOSTER VACCINE  Completed   • TDAP/TD VACCINES  Discontinued       No orders of the defined types were placed in this encounter.      Return in about 1 year (around 2024) for Medicare Wellness.

## 2023-12-04 NOTE — PROGRESS NOTES
Subsequent Medicare Wellness Visit    Chief Complaint   Patient presents with    Medicare Wellness-subsequent    Hypothyroidism       Subjective      Fanta Rodney is a 88 y.o. female who presents for a Subsequent Medicare Wellness Visit.   The patient is  here to follow up on the cholesterol and is trying to follow a diet. The patient is  also here to follow up on thyroid and is  due to get lab work done .  The patient also needs refills on medications .  She had covid 2 weeks ago and was seen in the urgent care and her records have been reviewed , she is due for DEXA scan  Hyperlipidemia   Pertinent negatives include no chest pain or shortness of breath.          The following portions of the patient's history were reviewed and   updated as appropriate: allergies, current medications, past family history, past medical history, past social history, past surgical history, and problem list.    Compared to one year ago, the patient feels her physical   health is the same.    Compared to one year ago, the patient feels her mental   health is the same.    Recent Hospitalizations:  She was not admitted to the hospital during the last year.       Current Medical Providers:  Patient Care Team:  Tiera Freed MD as PCP - General (Internal Medicine)  Ernie Carson APRN as Nurse Practitioner (Gastroenterology)    Outpatient Medications Prior to Visit   Medication Sig Dispense Refill    acetaminophen (TYLENOL) 650 MG 8 hr tablet Take 1 tablet by mouth Every 8 (Eight) Hours As Needed for Mild Pain.      Ascorbic Acid (VITAMIN C) 500 MG capsule Take  by mouth daily.      calcium carbonate (OS-MIRZA) 600 MG tablet Take 1 tablet by mouth Daily.      Cholecalciferol 25 MCG (1000 UT) tablet Take 1 tablet by mouth Daily.      TURMERIC PO Take 1,000 mg by mouth Daily.      levothyroxine (SYNTHROID, LEVOTHROID) 100 MCG tablet Take 1 tablet by mouth Daily. 30 tablet 0    Calcium 200 MG tablet Take  by mouth.      Calcium  "Carbonate-Vitamin D (CALCIUM-D PO) Take  by mouth.       No facility-administered medications prior to visit.       No opioid medication identified on active medication list. I have reviewed chart for other potential  high risk medication/s and harmful drug interactions in the elderly.        Aspirin is not on active medication list.  Aspirin use is not indicated based on review of current medical condition/s. Risk of harm outweighs potential benefits.  .    Patient Active Problem List   Diagnosis    Gastroesophageal reflux disease with esophagitis    Hypothyroidism    Vitamin D deficiency    Arthritis    Seasonal allergic rhinitis due to pollen    Neck muscle spasm    Snoring     Advance Care Planning   Advance Care Planning     Advance Directive is on file.  ACP discussion was held with the patient during this visit. Patient has an advance directive in EMR which is still valid.      Objective    Vitals:    12/04/23 1519   BP: 108/75   Pulse: 92   Resp: 16   Temp: 98.2 °F (36.8 °C)   SpO2: 98%   Weight: 55.3 kg (122 lb)   Height: 160 cm (62.99\")   PainSc: 0-No pain     Estimated body mass index is 21.62 kg/m² as calculated from the following:    Height as of this encounter: 160 cm (62.99\").    Weight as of this encounter: 55.3 kg (122 lb).    BMI is within normal parameters. No other follow-up for BMI required.     Physical Examination  Vitals and nursing note reviewed  General appearance: Normocephalic and nontraumatic  HEENT: External inspection of eyes, ears and nose appears benign, hearing appears intact  Neck: Neck appears supple, trachea in midline, thyroid appears not enlarged  Respiratory: Respiratory effort appears normal  Musculoskeletal: Moving all limbs,  Range of motion of visible joints appears within normal  CNS: No gross motor or sensory deficits  No gross cranial nerve deficits  No tremors  Psychiatry: Alert and oriented x3  Memory appears intact  Mood and affect appears normal  Insight appears " normal      Does the patient have evidence of cognitive impairment?   No    Lab Results   Component Value Date    CHLPL 210 (H) 10/18/2023    TRIG 115 10/18/2023    HDL 57 10/18/2023     (H) 10/18/2023    VLDL 20 10/18/2023          HEALTH RISK ASSESSMENT    Smoking Status:  Social History     Tobacco Use   Smoking Status Never   Smokeless Tobacco Never     Alcohol Consumption:  Social History     Substance and Sexual Activity   Alcohol Use Yes    Comment: OCCASIONALLY     Fall Risk Screen:    KENYA Fall Risk Assessment was completed, and patient is at MODERATE risk for falls. Assessment completed on:2023    Depression Screenin/4/2023     3:26 PM   PHQ-2/PHQ-9 Depression Screening   Little Interest or Pleasure in Doing Things 0-->not at all   Feeling Down, Depressed or Hopeless 0-->not at all   PHQ-9: Brief Depression Severity Measure Score 0       Health Habits and Functional and Cognitive Screenin/4/2023     3:25 PM   Functional & Cognitive Status   Do you have difficulty preparing food and eating? No   Do you have difficulty bathing yourself, getting dressed or grooming yourself? No   Do you have difficulty using the toilet? No   Do you have difficulty moving around from place to place? No   Do you have trouble with steps or getting out of a bed or a chair? No   Current Diet Well Balanced Diet   Dental Exam Up to date   Eye Exam Up to date   Exercise (times per week) 4 times per week   Current Exercises Include House Cleaning;Other;Walking   Do you need help using the phone?  No   Are you deaf or do you have serious difficulty hearing?  No   Do you need help to go to places out of walking distance? No   Do you need help shopping? No   Do you need help preparing meals?  No   Do you need help with housework?  No   Do you need help with laundry? No   Do you need help taking your medications? No   Do you need help managing money? No   Do you ever drive or ride in a car without wearing  a seat belt? No   Have you felt unusual stress, anger or loneliness in the last month? No   Who do you live with? Alone   If you need help, do you have trouble finding someone available to you? No   Have you been bothered in the last four weeks by sexual problems? No   Do you have difficulty concentrating, remembering or making decisions? No       Age-appropriate Screening Schedule:  Refer to the list below for future screening recommendations based on patient's age, sex and/or medical conditions. Orders for these recommended tests are listed in the plan section. The patient has been provided with a written plan.    Health Maintenance   Topic Date Due    DXA SCAN  02/28/2015    COVID-19 Vaccine (5 - 2023-24 season) 10/28/2024 (Originally 9/1/2023)    LIPID PANEL  10/18/2024    ANNUAL WELLNESS VISIT  12/04/2024    INFLUENZA VACCINE  Completed    Pneumococcal Vaccine 65+  Completed    ZOSTER VACCINE  Completed    TDAP/TD VACCINES  Discontinued              Data reviewed :[facility] with Shantell Weiner PA (11/24/2023)     CMS Preventative Services Quick Reference  Risk Factors Identified During Encounter:    Fall Risk-High or Moderate: Discussed Fall Prevention in the home, Information on Fall Prevention Shared in After Visit Summary, and Sit to Stand Exercise Information Shared in After Visit Summary    The above risks/problems have been discussed with the patient.  Pertinent information has been shared with the patient in the After Visit Summary.    Diagnoses and all orders for this visit:    1. Medicare annual wellness visit, subsequent (Primary)    2. Encounter for screening for osteoporosis  -     DEXA Bone Density Axial    3. Menopause  -     DEXA Bone Density Axial    4. At moderate risk for fall    5. Mixed hyperlipidemia  -     CBC & Differential  -     Comprehensive Metabolic Panel  -     Lipid Panel    6. Adult onset hypothyroidism  -     TSH    Plan:  1.physical: Physical exam conducted today, reviewed  fasting lab work,   Impression: healthy adult Patient. Currently, patient eats a healthy diet. Screening lab work includes glucose, lipid profile and complete blood count . The risks and benefits of immunizations were discussed and immunizations are up to date. Advice and education were given regarding nutrition and aerobic exercise. Patient discussion: discussed with the patient.  Annual Wellness Visit  with preventive exam as well as age and risk appropriate counseling completed.   Advance Directive Planning: paperwork and instructions were given to the patient.   Patient Discussion: plan discussed with the patient, follow-up visit needed in one year. Medication Review and medication list updated   2.mixed hyperlipidemia:  reviewed  fasting CMP and lipid panel.  Diet and exercise counseled,    3.   hypothyroidism:  reviewed  tsh , and continue levothyroxine  4.  Screening for osteoporosis: Will obtain DEXA scan    Follow Up:   Next Medicare Wellness visit to be scheduled in 1 year.      An After Visit Summary and PPPS were made available to the patient.

## 2023-12-05 DIAGNOSIS — E03.8 ADULT ONSET HYPOTHYROIDISM: ICD-10-CM

## 2023-12-05 LAB — TSH SERPL DL<=0.005 MIU/L-ACNC: 0.58 UIU/ML (ref 0.45–4.5)

## 2023-12-05 RX ORDER — LEVOTHYROXINE SODIUM 0.1 MG/1
100 TABLET ORAL DAILY
Qty: 90 TABLET | Refills: 2 | Status: SHIPPED | OUTPATIENT
Start: 2023-12-05

## 2024-01-04 ENCOUNTER — APPOINTMENT (OUTPATIENT)
Dept: BONE DENSITY | Facility: HOSPITAL | Age: 89
End: 2024-01-04
Payer: MEDICARE

## 2024-01-04 PROCEDURE — 77080 DXA BONE DENSITY AXIAL: CPT

## 2024-02-01 ENCOUNTER — OFFICE VISIT (OUTPATIENT)
Dept: GASTROENTEROLOGY | Facility: CLINIC | Age: 89
End: 2024-02-01
Payer: MEDICARE

## 2024-02-01 VITALS
WEIGHT: 124 LBS | SYSTOLIC BLOOD PRESSURE: 102 MMHG | HEART RATE: 71 BPM | HEIGHT: 63 IN | DIASTOLIC BLOOD PRESSURE: 70 MMHG | BODY MASS INDEX: 21.97 KG/M2 | OXYGEN SATURATION: 98 % | RESPIRATION RATE: 14 BRPM

## 2024-02-01 DIAGNOSIS — Z12.11 ENCOUNTER FOR SCREENING FOR MALIGNANT NEOPLASM OF COLON: ICD-10-CM

## 2024-02-01 DIAGNOSIS — Z80.0 FAMILY HISTORY OF COLON CANCER IN MOTHER: ICD-10-CM

## 2024-02-01 DIAGNOSIS — R19.5 LOOSE STOOLS: Chronic | ICD-10-CM

## 2024-02-01 DIAGNOSIS — R63.4 WEIGHT LOSS: Primary | Chronic | ICD-10-CM

## 2024-02-01 NOTE — PROGRESS NOTES
Follow Up Note     Date: 2024   Patient Name: Fanta Rodney  MRN: [id number]  : 1935     Primary Care Provider: Tiera Freed MD     Chief Complaint   Patient presents with    Weight Loss     History of present illness:   2024  Fanta Rodney is a 88 y.o. female who is here today for follow up for weight loss. She has been doing well. Her weight has been stable around 124 since 2023. She states her weight was always around 127 when she was younger and gained a few pounds after having children.  She denies any abdominal pain, nausea or vomiting. No reflux or difficulty swallowing. She has cut out dairy and has not had any further episodes of loose stools. Denies any GI bleeding.     Interval History:  10/30/2023  She had lost about 9 pounds unintentionally over 2-3 months. She had her thyroid medication increased from 110 mcg to 112 mcg and she started losing weight. She had the dose decreased back to 110 mcg and her weight has been stable, no further weight loss. She had some loose stools, but she cut out dairy and she has not had any further episodes of loose stool. She is having 2 soft bowel movements per day. She denies any abdominal pain, nausea or vomiting. Denies reflux or difficulty swallowing. Denies any GI bleeding. Her last colonoscopy was 2013 and was normal per patient. She states she has never had polyps in the past. Her last EGD was 2013 and was normal per patient.      Her mother had colon cancer in her late 80's. No other family history of GI malignancy.     Subjective      Past Medical History:   Diagnosis Date    Anxiety     Arthritis     Bilateral cataracts     HISTORY OF HAS HAD REMOVED    Body piercing     EARS ONLY    Choledocholithiasis     Disease of thyroid gland     GERD (gastroesophageal reflux disease)     History of esophagogastroduodenoscopy (EGD) 2013    History of toe fracture 10/2012    Left middle toe    Murmur     Nausea with  vomiting     Osteoarthritis     Stress fracture of metatarsal bone 06/02/2013    Left Foot - Treated by Dr. Manley    Tear of meniscus of knee     Urinary tract infection     Wears glasses     Wears partial dentures     LOWER ONLY     Past Surgical History:   Procedure Laterality Date    ADENOIDECTOMY      CATARACT EXTRACTION Right 08/31/2016    Dr. Michele Damian    CATARACT EXTRACTION Left 09/29/2016    Dr. Michele Damian     CHOLECYSTECTOMY  09/27/2013    Dr. Alexis Lobo    COLONOSCOPY  02/08/2013    Dr. Alexis Gary    DILATION AND CURETTAGE, DIAGNOSTIC / THERAPEUTIC  1967    ENDOSCOPY      FINGER SURGERY Right 08/17/2006    2 fingers - joint replacement - Dr. [facility]    JOINT REPLACEMENT  Aug. 17,2006-July24,2018    Two fingers in right hand-Knee replacement    THYROIDECTOMY  10/09/1967    JAMIL Pantoja - Diseased Thyroid (Goiter)    TONSILLECTOMY      TOTAL KNEE ARTHROPLASTY Right 07/24/2018    Procedure: Elective right total knee replacement (BIOMET);  Surgeon: Oscar Snow MD;  Location: [facility];  Service: Orthopedics     Family History   Problem Relation Age of Onset    Colon cancer Mother         diagnosed in her late 80's    Cancer Mother     Osteoarthritis Mother     Cancer Father     Prostate cancer Father      Social History     Socioeconomic History    Marital status:    Tobacco Use    Smoking status: Never    Smokeless tobacco: Never   Vaping Use    Vaping Use: Never used   Substance and Sexual Activity    Alcohol use: Yes     Comment: OCCASIONALLY    Drug use: No    Sexual activity: Never       Current Outpatient Medications:     acetaminophen (TYLENOL) 650 MG 8 hr tablet, Take 1 tablet by mouth Every 8 (Eight) Hours As Needed for Mild Pain., Disp: , Rfl:     Ascorbic Acid (VITAMIN C) 500 MG capsule, Take  by mouth daily., Disp: , Rfl:     calcium carbonate (OS-MIRZA) 600 MG tablet, Take 1 tablet by mouth Daily., Disp: , Rfl:     Cholecalciferol 25 MCG (1000 UT)  "tablet, Take 1 tablet by mouth Daily., Disp: , Rfl:     levothyroxine (SYNTHROID, LEVOTHROID) 100 MCG tablet, Take 1 tablet by mouth Daily., Disp: 90 tablet, Rfl: 2    TURMERIC PO, Take 1,000 mg by mouth Daily., Disp: , Rfl:     Allergies   Allergen Reactions    Tomato Swelling     Fresh tomatoes.     Nexium [Esomeprazole Magnesium] GI Intolerance     The following portions of the patient's history were reviewed and updated as appropriate: allergies, current medications, past family history, past medical history, past social history, past surgical history and problem list.  Objective     Physical Exam  Vitals and nursing note reviewed.   Constitutional:       General: She is not in acute distress.     Appearance: Normal appearance. She is well-developed.   HENT:      Head: Normocephalic and atraumatic.      Mouth/Throat:      Mouth: Mucous membranes are not pale, not dry and not cyanotic.   Eyes:      General: Lids are normal.   Neck:      Trachea: Trachea normal.   Cardiovascular:      Rate and Rhythm: Normal rate.   Pulmonary:      Effort: Pulmonary effort is normal. No respiratory distress.      Breath sounds: Normal breath sounds.   Abdominal:      Tenderness: There is no abdominal tenderness.   Skin:     General: Skin is warm and dry.   Neurological:      Mental Status: She is alert and oriented to person, place, and time.   Psychiatric:         Mood and Affect: Mood normal.         Speech: Speech normal.         Behavior: Behavior normal. Behavior is cooperative.       Vitals:    02/01/24 1109   BP: 102/70   Pulse: 71   Resp: 14   SpO2: 98%   Weight: 56.2 kg (124 lb)   Height: 160 cm (63\")     Body mass index is 21.97 kg/m².     Results Review:   I reviewed the patient's new clinical results.    Admission on 11/24/2023, Discharged on 11/24/2023   Component Date Value Ref Range Status    Rapid Strep A Screen 11/24/2023 Negative   Final    Internal Control 11/24/2023 Passed   Final    Lot Number 11/24/2023 " [id number]   Final    Expiration Date 11/24/2023 6/26   Final    SARS Antigen 11/24/2023 Detected (A)  Not Detected, Presumptive Negative Final    Influenza A Antigen FREDERIC 11/24/2023 Not Detected  Not Detected Final    Influenza B Antigen FREDERIC 11/24/2023 Not Detected  Not Detected Final    Internal Control 11/24/2023 Passed  Passed Final    Lot Number 11/24/2023 [id number]   Final    Expiration Date 11/24/2023 11/03/2024   Final      Comprehensive Metabolic Panel (10/18/2023 10:39)  CBC & Differential (10/18/2023 10:39)  TSH (10/18/2023 10:39)  TSH (12/04/2023 16:07)    DEXA Bone Density Axial     Result Date: 1/4/2024   1. Bone mineral density of the lumbar spine is within the range of normal. 2. Bone mineral density of the left femoral neck is within the range of osteopenia.        Assessment / Plan      1. Weight loss  2. Loose stools  3. Encounter for screening for malignant neoplasm of colon  4. Family history of colon cancer in mother  Her weight has been stable since October 2023, no further weight loss. Denies any GI symptoms. As long as she avoids dairy she does not have loose stools. Denies any GI bleeding. Basic labs unremarkable. TSH now normal.  Her last colonoscopy and EGD were in 2013 and were normal according to the patient, we do not have those results.  Her mother had colon cancer in her late 80s.     Continue to avoid dairy.   CTAP to rule out solid organ pathology - patient wants to discuss with PCP first.   Discussed colonoscopy and/or EGD for further evaluation of weight loss - she wants to wait at this time. She will call back if she wants to schedule.     Patient Instructions   Continue to avoid all dairy. May use lactose free/dairy free alternatives such as almond milk, rice milk, oat milk, etc.   Discussed CTAP to rule out solid organ pathology - the patient wants to discuss with PCP first.   Discussed colonoscopy/EGD - she wants to wait at this time. She will call back if she wants to  schedule.   Follow up: The patient wants to call back.    Ernie Carson, APRN  2/1/2024    Please note that portions of this note were completed with a voice recognition program.

## 2024-02-01 NOTE — PATIENT INSTRUCTIONS
Continue to avoid all dairy. May use lactose free/dairy free alternatives such as almond milk, rice milk, oat milk, etc.   Discussed CTAP to rule out solid organ pathology - the patient wants to discuss with PCP first.   Discussed colonoscopy/EGD - she wants to wait at this time. She will call back if she wants to schedule.   Follow up: The patient wants to call back.

## 2024-02-12 ENCOUNTER — TELEMEDICINE (OUTPATIENT)
Dept: INTERNAL MEDICINE | Facility: CLINIC | Age: 89
End: 2024-02-12
Payer: MEDICARE

## 2024-02-12 DIAGNOSIS — E03.8 ADULT ONSET HYPOTHYROIDISM: ICD-10-CM

## 2024-02-12 DIAGNOSIS — R63.4 WEIGHT LOSS: ICD-10-CM

## 2024-02-12 DIAGNOSIS — E78.2 MIXED HYPERLIPIDEMIA: Primary | ICD-10-CM

## 2024-02-12 PROCEDURE — 99214 OFFICE O/P EST MOD 30 MIN: CPT | Performed by: INTERNAL MEDICINE

## 2024-02-12 PROCEDURE — 1160F RVW MEDS BY RX/DR IN RCRD: CPT | Performed by: INTERNAL MEDICINE

## 2024-02-12 PROCEDURE — 1159F MED LIST DOCD IN RCRD: CPT | Performed by: INTERNAL MEDICINE

## 2024-02-12 RX ORDER — LEVOTHYROXINE SODIUM 0.1 MG/1
100 TABLET ORAL DAILY
Qty: 90 TABLET | Refills: 1 | Status: SHIPPED | OUTPATIENT
Start: 2024-02-12

## 2024-04-09 ENCOUNTER — TELEPHONE (OUTPATIENT)
Dept: INTERNAL MEDICINE | Facility: CLINIC | Age: 89
End: 2024-04-09
Payer: MEDICARE

## 2024-04-09 NOTE — TELEPHONE ENCOUNTER
Caller: Fanta Rodney    Relationship: Self    Best call back number: [phone]    What is the best time to reach you: ANY TIME    Who are you requesting to speak with (clinical staff, provider,  specific staff member): DR COLEMAN    Do you know the name of the person who called: SELF    What was the call regarding: PATIENT SAW [facility] TODAY AND PATIENT HAS PLAQUE BEHIND HER EYE AND NEEDS A CAROTID ULTRASOUND. PATIENT HAS APPOINTMENT ON 04/15/2024 WITH PROVIDER HOWEVER CAN THIS WAIT UNTIL HER APPOINTMENT OR SHOULD PATIENT BE SCHEDULED SOONER? THERE ARE NO SOONER AVAILABLE APPOINTMENTS. PLEASE ADVISE

## 2024-04-09 NOTE — PROGRESS NOTES
Changed dose on thyroid medication , sent new prescription , with no refills, recheck tsh in one month The documentation recorded by the scribe accurately and completely reflects the service(s) I personally performed and the decisions made by me.   TIME SPENT IN REVIEWING RECORDS, LABS, IMAGING, CONFERRING WITH TEAM MEMBERS,  AND DISCUSSION WITH PATIENT APPROX 30 MIN    Chief complaint: office visit, ICD check     HISTORY OF PRESENT ILLNESS     Patient is a 68 year old female with a history of:  -ICMP  -S/p Biotronik single chamber ICD implanted 6/19/2023   - ICD of the right side due to history of left lumpectomy and radiation. DFT testing failed, so second ICD was placed in the left subclavian vein and successful DFT was performed.  -Aortic valve stenosis s/p TAVR 12/19/2022  -CHF  -Severe tricuspid valve disease  -DM  -HTN  -COPD  -Patient is on spironolactone 25 mg daily, lasix 40 mg bid, and coreg 6.25 mg bid     PAST MEDICAL, FAMILY AND SOCIAL HISTORY     I have personally reviewed and updated the following EPIC sections: Current medications, Allergies, Problem list, Past Medical History, Past Surgical History, Social History and Family History    REVIEW OF SYSTEMS     Review of Systems  A complete and comprehensive multi-system review was performed with pertinent positive and negative findings reflected in the above history, and is otherwise negative.    PHYSICAL EXAM     Physical Exam      The patient is awake alert oriented and in no apparent distress. Appears well developed, oriented x3.     VITAL SIGNS: Vital signs are obtained and are documented in electronic medical records.     HEENT  Examination of the head and neck reveals no jugular venous distention. Pupils are equal and reactive.  Thyroid is normal.  Ears normal.   No obvious cervical lymphadenopathy.  Oropharynx normal.    HEART:  The apex beat is palpable in the 5th interspace, anterior axillary line. Normal in character. The first heart sound is normal in intensity. The second heart sound is normally split. P2 is loud. Systolic murmurs heard at  A2 and P2 areas.  S4 is present. No rubs auscultated.    LUNGS:  Lungs are clear to auscultation and percussion. No rales are heard. The breath sounds are equal bilaterally and equal expansion is noted. Breath sounds are decreased overall.    ABDOMEN:  The abdomen is essentially benign. There is no hepatomegaly or tenderness in any part of the abdomen. Bowel sounds are present. No masses are felt. No ascites noted. Spleen is not palpable.     EXTREMITIES:  The extremities appear essentially normal. Pulses are equal but reduced.There is no peripheral edema.    CENTRAL NERVOUS SYSTEM:  Alert and oriented X3.  Cranial nerves 2-12 are intact. Reflexes appear normal. There is equal strength on both sides in both upper and lower limbs.    SKIN, BONES/JOINT:  No significant abnormality detected. Skin color is normal, warm and dry.          IMAGING/RESULTS     Recent Labs   Lab 02/09/24  1416 06/20/23  0459 06/19/23  1637 06/19/23  0717 06/08/23  1528 04/27/23  1445   HGB 13.3  --  12.7 12.4 13.1 13.6   BUN  --  19 17 17 17 16   Creatinine 0.86 0.80 0.80 0.87 0.90 0.82   Hemoglobin A1C  --   --  6.5*  --   --   --    Potassium 4.0 3.6 4.2 3.6 3.7 3.6   GOT/AST  --  26 38*  --   --  16   GPT  --  26 32  --   --  22   INR  --   --  1.1  --   --   --    TSH  --   --  0.770  --   --   --      Glomerular Filtration Rate (no units)   Date Value   02/09/2024 74     GPT/ALT (Units/L)   Date Value   06/20/2023 26     HCT (%)   Date Value   02/09/2024 40.9       LHC 3/16/2023  Nonobstructive CAD  Patent mid LAD stent     ECHO 3/14/2023  LVEF-16%  DILLAN- 47.9 ml/m²  IVS- 1.1 cm  LV enlargement with severe segmental LV systolic dysfunction. EF 16% The mid to distal septal, anterior and apical walls are akinetic.  The inferior and basal infero-septal walls are akinetic. The basal rajesh-septum is severely hypokinetic.  RV enlargement with moderately decreased radial RV function with preserved longitudinal function.  s/p TAVR (26 mm  Soria-Maurisio valve). Mean gradient is 7 mmHg. Mild paravalvular AI.  Severe tricuspid valve regurgitation.  PASP 44 mmHg.  Small pericardial effusion.  \  Impression  Cardiomyopathy  Status post ICD, right-sided, with left subclavian shocking coil    Discussion:  Patient is doing well.  We see no arrhythmias.  There have been no ICD shocks.  Wound is well healed.  We will continue present    Treatment.  PLAN:  Continue current medication regimen  Follow up in the office in 6 months      Call or return to clinic as needed if symptoms worsen, fail to improve as anticipated, or if new symptoms develop.     On 04/09/24, Jenniffer MULTANI, ALEX scribed the services personally performed by Easton Howard MD

## 2024-04-10 ENCOUNTER — OFFICE VISIT (OUTPATIENT)
Dept: INTERNAL MEDICINE | Facility: CLINIC | Age: 89
End: 2024-04-10
Payer: MEDICARE

## 2024-04-10 VITALS
OXYGEN SATURATION: 97 % | TEMPERATURE: 97.8 F | BODY MASS INDEX: 21.97 KG/M2 | SYSTOLIC BLOOD PRESSURE: 100 MMHG | WEIGHT: 124 LBS | DIASTOLIC BLOOD PRESSURE: 67 MMHG | RESPIRATION RATE: 20 BRPM | HEART RATE: 104 BPM | HEIGHT: 63 IN

## 2024-04-10 DIAGNOSIS — H53.8 BLURRY VISION: Primary | ICD-10-CM

## 2024-04-10 DIAGNOSIS — H34.8320 BRANCH RETINAL VEIN OCCLUSION OF LEFT EYE WITH MACULAR EDEMA: ICD-10-CM

## 2024-04-10 NOTE — PROGRESS NOTES
"Chief Complaint  Eye Problem (Left eye blurry)    Subjective        Fanta Rodney presents to [facility]  Patient complains that she  has been having  blurry vision in the left eye and has been seeing her eye doctor since last August she states that, when she was seen in march 2024  she  was noted to have macular edema and  was seen back yesterday and was advised to get a carotid doppler as she has left retinal vein occlusion,  she gets avastin in her eye from the specialist  Eye Problem           Objective   Vital Signs:  /67   Pulse 104   Temp 97.8 °F (36.6 °C)   Resp 20   Ht 160 cm (62.99\")   Wt 56.2 kg (124 lb)   SpO2 97%   BMI 21.97 kg/m²   Estimated body mass index is 21.97 kg/m² as calculated from the following:    Height as of this encounter: 160 cm (62.99\").    Weight as of this encounter: 56.2 kg (124 lb).       BMI is within normal parameters. No other follow-up for BMI required.      Physical Exam  Vitals and nursing note reviewed.   Constitutional:       General: She is not in acute distress.     Appearance: Normal appearance. She is not diaphoretic.   HENT:      Head: Normocephalic and atraumatic.      Right Ear: External ear normal.      Left Ear: External ear normal.      Nose: Nose normal.   Eyes:      Extraocular Movements: Extraocular movements intact.      Comments: Slight left eye conjunctival injection   Neck:      Trachea: Trachea normal.   Cardiovascular:      Rate and Rhythm: Normal rate and regular rhythm.      Heart sounds: Normal heart sounds.   Pulmonary:      Effort: Pulmonary effort is normal. No respiratory distress.      Breath sounds: Normal breath sounds.   Abdominal:      General: Abdomen is flat.   Musculoskeletal:      Cervical back: Neck supple.      Comments: Moves all limbs   Skin:     General: Skin is warm.   Neurological:      Mental Status: She is alert and oriented to person, place, and time.      Comments: No gross motor or " sensory deficits        Result Review :    The following data was reviewed by: Tiera Freed MD on 04/10/2024:        Office records of retinal specialist reviewed         Assessment and Plan     Diagnoses and all orders for this visit:    1. Blurry vision (Primary)  -     US Carotid Bilateral    2. 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  Blurry vision: Will obtain bilateral carotid Dopplers, secondary to:  2.  Left eye retinal vein branch occlusion : Will obtain bilateral carotid Dopplers,         Follow Up        Patient was given instructions and counseling regarding her condition or for health maintenance advice. Please see specific information pulled into the AVS if appropriate.

## 2024-04-12 LAB
ALBUMIN SERPL-MCNC: 3.9 G/DL (ref 3.5–5.2)
ALBUMIN/GLOB SERPL: 1.3 G/DL
ALP SERPL-CCNC: 70 U/L (ref 39–117)
ALT SERPL-CCNC: 10 U/L (ref 1–33)
AST SERPL-CCNC: 17 U/L (ref 1–32)
BASOPHILS # BLD AUTO: 0.06 10*3/MM3 (ref 0–0.2)
BASOPHILS NFR BLD AUTO: 1 % (ref 0–1.5)
BILIRUB SERPL-MCNC: 0.7 MG/DL (ref 0–1.2)
BUN SERPL-MCNC: 14 MG/DL (ref 8–23)
BUN/CREAT SERPL: 15.4 (ref 7–25)
CALCIUM SERPL-MCNC: 9.2 MG/DL (ref 8.6–10.5)
CHLORIDE SERPL-SCNC: 105 MMOL/L (ref 98–107)
CHOLEST SERPL-MCNC: 181 MG/DL (ref 0–200)
CO2 SERPL-SCNC: 26.7 MMOL/L (ref 22–29)
CREAT SERPL-MCNC: 0.91 MG/DL (ref 0.57–1)
EGFRCR SERPLBLD CKD-EPI 2021: 61.6 ML/MIN/1.73
EOSINOPHIL # BLD AUTO: 0.33 10*3/MM3 (ref 0–0.4)
EOSINOPHIL NFR BLD AUTO: 5.6 % (ref 0.3–6.2)
ERYTHROCYTE [DISTWIDTH] IN BLOOD BY AUTOMATED COUNT: 12.9 % (ref 12.3–15.4)
GLOBULIN SER CALC-MCNC: 2.9 GM/DL
GLUCOSE SERPL-MCNC: 88 MG/DL (ref 65–99)
HCT VFR BLD AUTO: 44.2 % (ref 34–46.6)
HDLC SERPL-MCNC: 52 MG/DL (ref 40–60)
HGB BLD-MCNC: 13.9 G/DL (ref 12–15.9)
IMM GRANULOCYTES # BLD AUTO: 0.01 10*3/MM3 (ref 0–0.05)
IMM GRANULOCYTES NFR BLD AUTO: 0.2 % (ref 0–0.5)
LDLC SERPL CALC-MCNC: 109 MG/DL (ref 0–100)
LYMPHOCYTES # BLD AUTO: 1.44 10*3/MM3 (ref 0.7–3.1)
LYMPHOCYTES NFR BLD AUTO: 24.2 % (ref 19.6–45.3)
MCH RBC QN AUTO: 29.8 PG (ref 26.6–33)
MCHC RBC AUTO-ENTMCNC: 31.4 G/DL (ref 31.5–35.7)
MCV RBC AUTO: 94.6 FL (ref 79–97)
MONOCYTES # BLD AUTO: 0.56 10*3/MM3 (ref 0.1–0.9)
MONOCYTES NFR BLD AUTO: 9.4 % (ref 5–12)
NEUTROPHILS # BLD AUTO: 3.54 10*3/MM3 (ref 1.7–7)
NEUTROPHILS NFR BLD AUTO: 59.6 % (ref 42.7–76)
NRBC BLD AUTO-RTO: 0 /100 WBC (ref 0–0.2)
PLATELET # BLD AUTO: 274 10*3/MM3 (ref 140–450)
POTASSIUM SERPL-SCNC: 4.4 MMOL/L (ref 3.5–5.2)
PROT SERPL-MCNC: 6.8 G/DL (ref 6–8.5)
RBC # BLD AUTO: 4.67 10*6/MM3 (ref 3.77–5.28)
SODIUM SERPL-SCNC: 141 MMOL/L (ref 136–145)
TRIGL SERPL-MCNC: 114 MG/DL (ref 0–150)
TSH SERPL DL<=0.005 MIU/L-ACNC: 0.45 UIU/ML (ref 0.27–4.2)
VLDLC SERPL CALC-MCNC: 20 MG/DL (ref 5–40)
WBC # BLD AUTO: 5.94 10*3/MM3 (ref 3.4–10.8)

## 2024-04-19 ENCOUNTER — HOSPITAL ENCOUNTER (OUTPATIENT)
Dept: ULTRASOUND IMAGING | Facility: HOSPITAL | Age: 89
Discharge: HOME | End: 2024-04-19
Payer: MEDICARE

## 2024-04-19 PROCEDURE — 93880 EXTRACRANIAL BILAT STUDY: CPT

## 2024-04-23 ENCOUNTER — OFFICE VISIT (OUTPATIENT)
Dept: INTERNAL MEDICINE | Facility: CLINIC | Age: 89
End: 2024-04-23
Payer: MEDICARE

## 2024-04-23 VITALS
HEIGHT: 63 IN | RESPIRATION RATE: 18 BRPM | DIASTOLIC BLOOD PRESSURE: 67 MMHG | HEART RATE: 83 BPM | SYSTOLIC BLOOD PRESSURE: 100 MMHG | OXYGEN SATURATION: 95 % | WEIGHT: 124 LBS | BODY MASS INDEX: 21.97 KG/M2 | TEMPERATURE: 97.7 F

## 2024-04-23 DIAGNOSIS — H53.9 VISION DISTURBANCE: ICD-10-CM

## 2024-04-23 DIAGNOSIS — E03.8 ADULT ONSET HYPOTHYROIDISM: ICD-10-CM

## 2024-04-23 DIAGNOSIS — E78.2 MIXED HYPERLIPIDEMIA: Primary | ICD-10-CM

## 2024-04-23 PROCEDURE — 99214 OFFICE O/P EST MOD 30 MIN: CPT | Performed by: INTERNAL MEDICINE

## 2024-04-23 PROCEDURE — 1160F RVW MEDS BY RX/DR IN RCRD: CPT | Performed by: INTERNAL MEDICINE

## 2024-04-23 PROCEDURE — G2211 COMPLEX E/M VISIT ADD ON: HCPCS | Performed by: INTERNAL MEDICINE

## 2024-04-23 PROCEDURE — 1159F MED LIST DOCD IN RCRD: CPT | Performed by: INTERNAL MEDICINE

## 2024-04-23 RX ORDER — ROSUVASTATIN CALCIUM 10 MG/1
10 TABLET, COATED ORAL NIGHTLY
Qty: 90 TABLET | Refills: 1 | Status: SHIPPED | OUTPATIENT
Start: 2024-04-23

## 2024-04-23 NOTE — PROGRESS NOTES
".  Start Crestor Chief Complaint  Hyperlipidemia, Hypothyroidism, and Eye Problem (Blurred vision left eye , imagine result)    Subjective        Fanta Rodney presents to [facility]  HPI:  The patient is  here to follow up on the cholesterol and   on thyroid and  had  lab work done .  The patient also needs refills on medications .  Patient is also here to follow-up on left eye blurry vision for which she follows up with retinal specialist and was noted to have retinal vein thrombosis and is on avastin injections, she had a carotid Doppler done which has been reviewed with her today, she is currently taking baby aspirin  Hyperlipidemia   Pertinent negatives include no chest pain or shortness of breath.          Objective   Vital Signs:  /67   Pulse 83   Temp 97.7 °F (36.5 °C)   Resp 18   Ht 160 cm (62.99\")   Wt 56.2 kg (124 lb)   SpO2 95%   BMI 21.97 kg/m²   Estimated body mass index is 21.97 kg/m² as calculated from the following:    Height as of this encounter: 160 cm (62.99\").    Weight as of this encounter: 56.2 kg (124 lb).       BMI is within normal parameters. No other follow-up for BMI required.      Physical Exam  Vitals and nursing note reviewed.   Constitutional:       General: She is not in acute distress.     Appearance: Normal appearance. She is not diaphoretic.   HENT:      Head: Normocephalic and atraumatic.      Right Ear: External ear normal.      Left Ear: External ear normal.      Nose: Nose normal.   Eyes:      Extraocular Movements: Extraocular movements intact.      Conjunctiva/sclera: Conjunctivae normal.   Neck:      Trachea: Trachea normal.   Cardiovascular:      Rate and Rhythm: Normal rate and regular rhythm.      Heart sounds: Normal heart sounds.   Pulmonary:      Effort: Pulmonary effort is normal. No respiratory distress.      Breath sounds: Normal breath sounds.   Abdominal:      General: Abdomen is flat.   Musculoskeletal:      " Cervical back: Neck supple.      Comments: Moves all limbs   Skin:     General: Skin is warm.   Neurological:      Mental Status: She is alert and oriented to person, place, and time.      Comments: No gross motor or sensory deficits        Result Review :    The following data was reviewed by: Tiera Freed MD on 04/23/2024:  Common labs          5/23/2023    08:35 10/18/2023    10:39 4/12/2024    09:18   Common Labs   Glucose 93  91  88    BUN 14  19  14    Creatinine 1.04  0.91  0.91    Sodium 140  139  141    Potassium 4.8  4.4  4.4    Chloride 105  103  105    Calcium 9.9  10.0  9.2    Total Protein 6.9  7.1  6.8    Albumin 4.0  4.5  3.9    Total Bilirubin 0.6  0.7  0.7    Alkaline Phosphatase 85  74  70    AST (SGOT) 17  18  17    ALT (SGPT) 14  12  10    WBC 6.66  6.68  5.94    Hemoglobin 14.7  14.4  13.9    Hematocrit 45.1  42.7  44.2    Platelets 260  250  274    Total Cholesterol 175  210  181    Triglycerides 103  115  114    HDL Cholesterol 57  57  52    LDL Cholesterol  99  133  109        US Carotid Bilateral (04/19/2024 15:13)            Assessment and Plan     Diagnoses and all orders for this visit:    1. Mixed hyperlipidemia (Primary)  -     rosuvastatin (Crestor) 10 MG tablet; Take 1 tablet by mouth Every Night.  Dispense: 90 tablet; Refill: 1  -     CBC (No Diff)  -     Comprehensive Metabolic Panel  -     Lipid Panel    2. Adult onset hypothyroidism  -     TSH    3. Vision disturbance    Plan:  1.   mixed hyperlipidemia:  reviewed  fasting CMP and lipid panel.  Diet and exercise counseled,  Will  Start Crestor 10 mg p.o. nightly .  Given recent history of retinal vein thrombosis ,  2. hypothyroidism:  reviewed  tsh , and continue levothyroxine  3.  Vision disturbance: To continue continue medication and follow-up with retinal specialist ,   carotid Doppler results reviewed and copy provided to patient         Follow Up     Return in about 14 weeks (around 7/30/2024).  Patient was given  instructions and counseling regarding her condition or for health maintenance advice. Please see specific information pulled into the AVS if appropriate.         Answers submitted by the patient for this visit:  Other (Submitted on 4/21/2024)  Please describe your symptoms.: possible carotid stenosis, plaque discovered behind left eye  Have you had these symptoms before?: No  How long have you been having these symptoms?: 1-2 weeks  Please list any medications you are currently taking for this condition.: low dose aspirin  Primary Reason for Visit (Submitted on 4/21/2024)  What is the primary reason for your visit?: Other

## 2024-06-21 DIAGNOSIS — M25.562 ARTHRALGIA OF LEFT KNEE: Primary | ICD-10-CM

## 2024-06-21 DIAGNOSIS — Z96.651 S/P TOTAL KNEE ARTHROPLASTY, RIGHT: ICD-10-CM

## 2024-06-24 ENCOUNTER — OFFICE VISIT (OUTPATIENT)
Dept: ORTHOPEDIC SURGERY | Facility: CLINIC | Age: 89
End: 2024-06-24
Payer: MEDICARE

## 2024-06-24 DIAGNOSIS — M25.562 ARTHRALGIA OF LEFT KNEE: Primary | ICD-10-CM

## 2024-06-24 DIAGNOSIS — M17.12 PRIMARY OSTEOARTHRITIS OF LEFT KNEE: ICD-10-CM

## 2024-06-24 DIAGNOSIS — M25.462 EFFUSION OF LEFT KNEE: ICD-10-CM

## 2024-06-24 DIAGNOSIS — Z96.651 STATUS POST TOTAL RIGHT KNEE REPLACEMENT USING CEMENT: ICD-10-CM

## 2024-06-24 PROCEDURE — 20610 DRAIN/INJ JOINT/BURSA W/O US: CPT | Performed by: PHYSICIAN ASSISTANT

## 2024-06-24 PROCEDURE — 1159F MED LIST DOCD IN RCRD: CPT | Performed by: PHYSICIAN ASSISTANT

## 2024-06-24 PROCEDURE — 1160F RVW MEDS BY RX/DR IN RCRD: CPT | Performed by: PHYSICIAN ASSISTANT

## 2024-06-24 PROCEDURE — 99203 OFFICE O/P NEW LOW 30 MIN: CPT | Performed by: PHYSICIAN ASSISTANT

## 2024-06-24 RX ORDER — LIDOCAINE HYDROCHLORIDE 20 MG/ML
2 INJECTION, SOLUTION INFILTRATION; PERINEURAL
Status: COMPLETED | OUTPATIENT
Start: 2024-06-24 | End: 2024-06-24

## 2024-06-24 RX ORDER — METHYLPREDNISOLONE ACETATE 40 MG/ML
40 INJECTION, SUSPENSION INTRA-ARTICULAR; INTRALESIONAL; INTRAMUSCULAR; SOFT TISSUE
Status: COMPLETED | OUTPATIENT
Start: 2024-06-24 | End: 2024-06-24

## 2024-06-24 RX ORDER — ASPIRIN 81 MG/1
TABLET ORAL
COMMUNITY
Start: 2024-04-11

## 2024-06-24 RX ADMIN — LIDOCAINE HYDROCHLORIDE 2 ML: 20 INJECTION, SOLUTION INFILTRATION; PERINEURAL at 11:18

## 2024-06-24 RX ADMIN — METHYLPREDNISOLONE ACETATE 40 MG: 40 INJECTION, SUSPENSION INTRA-ARTICULAR; INTRALESIONAL; INTRAMUSCULAR; SOFT TISSUE at 11:18

## 2024-06-24 NOTE — PROGRESS NOTES
"Subjective   Patient ID: Fanta Rodney is a 88 y.o. right hand dominant female is being seen for orthopaedic evaluation today for left knee   Pain of the Left Knee (Reports pain and swelling for one month. Reports overusing knee doing yard work noticing pain and swelling after.)         Pain  Associated symptoms include arthralgias (left knee) and joint swelling (left knee).     Patient presents for the evaluation of left knee pain and swelling that has been ongoing for 1 month.  She denies any specific injury or trauma.  There is been no redness or warmth.  She mentions around the time the knee pain began she had worked in her yard more than usual and could have \"overused it\"  Patient endorses she had a remote right total knee arthroplasty and her right knee is doing great.                                                 Past Medical History:   Diagnosis Date    Anxiety     Arthritis     Bilateral cataracts     HISTORY OF HAS HAD REMOVED    Body piercing     EARS ONLY    Choledocholithiasis     Disease of thyroid gland     GERD (gastroesophageal reflux disease)     History of esophagogastroduodenoscopy (EGD) 09/06/2013    History of toe fracture 10/2012    Left middle toe    Knee swelling several years ago    Murmur     Nausea with vomiting     Osteoarthritis     Stress fracture of metatarsal bone 06/02/2013    Left Foot - Treated by Dr. Manley    Tear of meniscus of knee     Urinary tract infection     Wears glasses     Wears partial dentures     LOWER ONLY        Past Surgical History:   Procedure Laterality Date    ADENOIDECTOMY      CATARACT EXTRACTION Right 08/31/2016    Dr. Michele Damian    CATARACT EXTRACTION Left 09/29/2016    Dr. Michele Damian     CHOLECYSTECTOMY  09/27/2013    Dr. Aleixs Lobo    COLONOSCOPY  02/08/2013    Dr. Alexis Gary    DILATION AND CURETTAGE, DIAGNOSTIC / THERAPEUTIC  1967    ENDOSCOPY      FINGER SURGERY Right 08/17/2006    2 fingers - joint replacement - Dr. Suh - [place]    " HAND SURGERY  Aug. 17, 2006    Joint replacement on two fingers    JOINT REPLACEMENT  Aug. 17,2006-July24,2018    Two fingers in right hand-Knee replacement    NECK SURGERY  Oct. 9, 1967    Throidectomy    THYROIDECTOMY  10/09/1967    JAMIL Pantoja - Diseased Thyroid (Goiter)    TONSILLECTOMY      TOTAL KNEE ARTHROPLASTY Right 07/24/2018    Procedure: Elective right total knee replacement (BIOMET);  Surgeon: Oscar Snow MD;  Location: [facility];  Service: Orthopedics       Family History   Problem Relation Age of Onset    Colon cancer Mother         diagnosed in her late 80's    Cancer Mother     Osteoarthritis Mother     Cancer Father     Prostate cancer Father         Social History     Socioeconomic History    Marital status:    Tobacco Use    Smoking status: Never    Smokeless tobacco: Never   Vaping Use    Vaping status: Never Used   Substance and Sexual Activity    Alcohol use: Yes     Comment: OCCASIONALLY    Drug use: No    Sexual activity: Not Currently       Allergies   Allergen Reactions    Tomato Swelling     Fresh tomatoes.     Nexium [Esomeprazole Magnesium] GI Intolerance       Review of Systems   Musculoskeletal:  Positive for arthralgias (left knee) and joint swelling (left knee).       Objective   There were no vitals taken for this visit.  Physical Exam  Vitals and nursing note reviewed.   Constitutional:       Appearance: Normal appearance.   Pulmonary:      Effort: Pulmonary effort is normal.   Musculoskeletal:      Right knee: No bony tenderness. Normal range of motion. No tenderness.      Left knee: Effusion, bony tenderness and crepitus present. No swelling, erythema or ecchymosis. Tenderness present over the medial joint line and lateral joint line. No MCL or LCL tenderness. No LCL laxity, MCL laxity or ACL laxity.  Neurological:      Mental Status: She is alert and oriented to person, place, and time.       Left Knee Exam     Other   Effusion: effusion  present          Extremity DVT signs are negative on physical exam with negative Rosa sign, no calf pain, no palpable cords, and no skin tone change   Neurologic Exam     Mental Status   Oriented to person, place, and time.            Assessment & Plan   Independent Review of Radiographic Studies:    X-ray of the bilateral knee 2 view weightbearing performed in the clinic independently reviewed for the evaluation of pain to the left knee and evaluation of prosthesis on the right knee.  Comparison films are available to review of the right knee.  No comparison films available for the left knee.  The right knee hardware appears in proper position without evidence of periprosthetic fracture or loosening  The left knee reveals tricompartmental degenerative changes with pronounced lateral femoral condyle spurring.  There is also evidence of mild chondrocalcinosis to the left lateral joint space    Large Joint Arthrocentesis: L knee  Date/Time: 6/24/2024 11:18 AM  Consent given by: patient  Site marked: site marked  Timeout: Immediately prior to procedure a time out was called to verify the correct patient, procedure, equipment, support staff and site/side marked as required   Supporting Documentation  Indications: pain   Procedure Details  Location: knee - L knee  Preparation: Patient was prepped and draped in the usual sterile fashion  Needle size: 22 G  Approach: anterolateral  Medications administered: 40 mg methylPREDNISolone acetate 40 MG/ML; 2 mL lidocaine 2%  Aspirate amount: 40 mL  Aspirate: blood-tinged and serous  Patient tolerance: patient tolerated the procedure well with no immediate complications          Diagnoses and all orders for this visit:    1. Arthralgia of left knee (Primary)    2. Status post total right knee replacement using cement    3. Primary osteoarthritis of left knee    4. Effusion of left knee    Other orders  -     Large Joint Arthrocentesis: L knee       Discussion of orthopedic  goals  Orthopedic activities reviewed and patient expressed appreciation  Risk, benefits, and merits of treatment alternatives reviewed with the patient and questions answered  Watch for signs and symptoms of infection  Call or notify for any adverse effect from injection therapy    Recommendations/Plan:  Exercise, medications, injections, other patient advice, and return appointment as noted.  Patient is encouraged to call or return for any issues or concerns.    The knee aspirate was not sent to the lab because her symptoms have been ongoing for nearly 1 month, no redness or warmth to the knee and not to mention, the fluid appears benign with blood-tinged serous joint fluid.  Patient agreeable to call or return sooner for any concerns.    BMI is within normal parameters. No other follow-up for BMI required.

## 2024-07-08 ENCOUNTER — OFFICE VISIT (OUTPATIENT)
Dept: ORTHOPEDIC SURGERY | Facility: CLINIC | Age: 89
End: 2024-07-08
Payer: MEDICARE

## 2024-07-08 VITALS — BODY MASS INDEX: 21.97 KG/M2 | HEIGHT: 63 IN | TEMPERATURE: 97.6 F | WEIGHT: 124 LBS

## 2024-07-08 DIAGNOSIS — M17.12 PRIMARY OSTEOARTHRITIS OF LEFT KNEE: ICD-10-CM

## 2024-07-08 DIAGNOSIS — M25.462 EFFUSION OF LEFT KNEE: ICD-10-CM

## 2024-07-08 DIAGNOSIS — M25.562 ARTHRALGIA OF LEFT KNEE: Primary | ICD-10-CM

## 2024-07-08 LAB
APPEARANCE FLD: ABNORMAL
COLOR FLD: ABNORMAL
LYMPHOCYTES NFR FLD MANUAL: 76 %
NEUTROPHILS NFR FLD MANUAL: 24 %
RBC # FLD AUTO: ABNORMAL /MM3 (ref 0–200000)
WBC # FLD AUTO: 1590 /MM3 (ref 0–1000)

## 2024-07-08 PROCEDURE — 1159F MED LIST DOCD IN RCRD: CPT | Performed by: PHYSICIAN ASSISTANT

## 2024-07-08 PROCEDURE — 87075 CULTR BACTERIA EXCEPT BLOOD: CPT | Performed by: PHYSICIAN ASSISTANT

## 2024-07-08 PROCEDURE — 87205 SMEAR GRAM STAIN: CPT | Performed by: PHYSICIAN ASSISTANT

## 2024-07-08 PROCEDURE — 1160F RVW MEDS BY RX/DR IN RCRD: CPT | Performed by: PHYSICIAN ASSISTANT

## 2024-07-08 PROCEDURE — 99213 OFFICE O/P EST LOW 20 MIN: CPT | Performed by: PHYSICIAN ASSISTANT

## 2024-07-08 PROCEDURE — 87015 SPECIMEN INFECT AGNT CONCNTJ: CPT | Performed by: PHYSICIAN ASSISTANT

## 2024-07-08 PROCEDURE — 20610 DRAIN/INJ JOINT/BURSA W/O US: CPT | Performed by: PHYSICIAN ASSISTANT

## 2024-07-08 PROCEDURE — 89051 BODY FLUID CELL COUNT: CPT | Performed by: PHYSICIAN ASSISTANT

## 2024-07-08 PROCEDURE — 89060 EXAM SYNOVIAL FLUID CRYSTALS: CPT | Performed by: PHYSICIAN ASSISTANT

## 2024-07-08 PROCEDURE — 87070 CULTURE OTHR SPECIMN AEROBIC: CPT | Performed by: PHYSICIAN ASSISTANT

## 2024-07-08 NOTE — PROGRESS NOTES
Subjective   Patient ID: Fanta Rodney is a 88 y.o. right hand dominant female  Follow-up and Pain of the Left Knee (States the injection she had on 6/28/24 has not helped, she is still having pain.)         Pain  Associated symptoms include arthralgias (left knee) and joint swelling (left knee). Pertinent negatives include no abdominal pain, chest pain, fever or rash.       Patient presents for reevaluation of continued left knee pain and swelling.  She states the cortisone injection administered in our office on 6/28/2024 provided only minimal relief and took at least 1 week to start providing some relief.  She still has difficulty sleeping at night due to the pain.  There has been no redness or warmth.  No fever or chills.  There has been no recent illness or infection.  No history of gout.       Past Medical History:   Diagnosis Date    Anxiety     Arthritis     Bilateral cataracts     HISTORY OF HAS HAD REMOVED    Body piercing     EARS ONLY    Choledocholithiasis     Disease of thyroid gland     GERD (gastroesophageal reflux disease)     History of esophagogastroduodenoscopy (EGD) 09/06/2013    History of toe fracture 10/2012    Left middle toe    Knee swelling several years ago    Murmur     Nausea with vomiting     Osteoarthritis     Stress fracture of metatarsal bone 06/02/2013    Left Foot - Treated by Dr. Manley    Tear of meniscus of knee     Urinary tract infection     Wears glasses     Wears partial dentures     LOWER ONLY        Past Surgical History:   Procedure Laterality Date    ADENOIDECTOMY      CATARACT EXTRACTION Right 08/31/2016    Dr. Michele Damian    CATARACT EXTRACTION Left 09/29/2016    Dr. Michele Damian     CHOLECYSTECTOMY  09/27/2013    Dr. Alexis Lobo    COLONOSCOPY  02/08/2013    Dr. Alexis Gary    DILATION AND CURETTAGE, DIAGNOSTIC / THERAPEUTIC  1967    ENDOSCOPY      FINGER SURGERY Right 08/17/2006    2 fingers - joint replacement - Dr. Suh - [place]    HAND SURGERY  Aug. 17,  2006    Joint replacement on two fingers    JOINT REPLACEMENT  Aug. 17,2006-July24,2018    Two fingers in right hand-Knee replacement    NECK SURGERY  Oct. 9, 1967    Throidectomy    THYROIDECTOMY  10/09/1967    JAMIL Pantoja - Diseased Thyroid (Goiter)    TONSILLECTOMY      TOTAL KNEE ARTHROPLASTY Right 07/24/2018    Procedure: Elective right total knee replacement (BIOMET);  Surgeon: Oscar Snow MD;  Location: [facility];  Service: Orthopedics       Family History   Problem Relation Age of Onset    Colon cancer Mother         diagnosed in her late 80's    Cancer Mother     Osteoarthritis Mother     Cancer Father     Prostate cancer Father        Social History     Socioeconomic History    Marital status:    Tobacco Use    Smoking status: Never    Smokeless tobacco: Never   Vaping Use    Vaping status: Never Used   Substance and Sexual Activity    Alcohol use: Yes     Comment: OCCASIONALLY    Drug use: No    Sexual activity: Not Currently         Current Outpatient Medications:     acetaminophen (TYLENOL) 650 MG 8 hr tablet, Take 1 tablet by mouth Every 8 (Eight) Hours As Needed for Mild Pain., Disp: , Rfl:     Ascorbic Acid (VITAMIN C) 500 MG capsule, Take  by mouth daily., Disp: , Rfl:     aspirin (Aspirin Low Dose) 81 MG EC tablet, , Disp: , Rfl:     calcium carbonate (OS-MIRZA) 600 MG tablet, Take 1 tablet by mouth Daily., Disp: , Rfl:     Cholecalciferol 25 MCG (1000 UT) tablet, Take 1 tablet by mouth Daily., Disp: , Rfl:     levothyroxine (SYNTHROID, LEVOTHROID) 100 MCG tablet, Take 1 tablet by mouth Daily., Disp: 90 tablet, Rfl: 1    rosuvastatin (Crestor) 10 MG tablet, Take 1 tablet by mouth Every Night., Disp: 90 tablet, Rfl: 1    TURMERIC PO, Take 1,000 mg by mouth Daily., Disp: , Rfl:     Allergies   Allergen Reactions    Tomato Swelling     Fresh tomatoes.     Nexium [Esomeprazole Magnesium] GI Intolerance       Review of Systems   Constitutional:  Negative for fever.  "  HENT:  Negative for dental problem and voice change.    Eyes:  Negative for visual disturbance.   Respiratory:  Negative for shortness of breath.    Cardiovascular:  Negative for chest pain.   Gastrointestinal:  Negative for abdominal pain.   Genitourinary:  Negative for dysuria.   Musculoskeletal:  Positive for arthralgias (left knee) and joint swelling (left knee). Negative for gait problem.   Skin:  Negative for rash.   Neurological:  Negative for speech difficulty.   Hematological:  Does not bruise/bleed easily.   Psychiatric/Behavioral:  Negative for confusion.        I have reviewed the medical and surgical history, family history, social history, medications, and/or allergies, and the review of systems of this report.    Objective   Temp 97.6 °F (36.4 °C)   Ht 160 cm (62.99\")   Wt 56.2 kg (124 lb)   BMI 21.97 kg/m²    Physical Exam  Vitals and nursing note reviewed.   Constitutional:       Appearance: Normal appearance.   Pulmonary:      Effort: Pulmonary effort is normal.   Musculoskeletal:      Left knee: Swelling present. No erythema. Tenderness present over the medial joint line and lateral joint line. No MCL or LCL tenderness. No LCL laxity, MCL laxity, ACL laxity or PCL laxity.  Neurological:      Mental Status: She is alert and oriented to person, place, and time.       Ortho Exam   Extremity DVT signs are negative on physical exam with negative Rosa sign, no calf pain, no palpable cords, and no skin tone change   Neurologic Exam     Mental Status   Oriented to person, place, and time.              Assessment & Plan   Independent Review of Radiographic Studies:    No new imaging done today.  Reviewed imaging with patient at a prior visit.  Reviewed relevant prior imaging with patient again today.      Large Joint Arthrocentesis: L knee  Date/Time: 7/8/2024 3:05 PM  Consent given by: patient  Site marked: site marked  Timeout: Immediately prior to procedure a time out was called to verify the " correct patient, procedure, equipment, support staff and site/side marked as required   Supporting Documentation  Indications: pain   Procedure Details  Location: knee - L knee  Preparation: Patient was prepped and draped in the usual sterile fashion  Needle gauge: 21g.  Approach: lateral  Medications administered: 25 mg sodium hyaluronate 25 MG/2.5ML  Aspirate amount: 24 mL  Aspirate: bloody  Analysis: fluid sample sent for laboratory analysis  Patient tolerance: patient tolerated the procedure well with no immediate complications           Diagnoses and all orders for this visit:    1. Arthralgia of left knee (Primary)  -     Body Fluid Culture - Body Fluid, Synovium; Future  -     Body Fluid Cell Count With Differential - Body Fluid, Knee, Left  -     Crystal Exam, Fluid - Synovial Fluid,  -     Anaerobic Culture - Swab, Synovium; Future    2. Effusion of left knee  -     Body Fluid Culture - Body Fluid, Synovium; Future  -     Body Fluid Cell Count With Differential - Body Fluid, Knee, Left  -     Crystal Exam, Fluid - Synovial Fluid,  -     Anaerobic Culture - Swab, Synovium; Future    Other orders  -     Cancel: Splint Application  -     Large Joint Arthrocentesis: L knee       Orthopedic activities reviewed and patient expressed appreciation  Regular exercise as tolerated  Risk, benefits, and merits of treatment alternatives reviewed with the patient and questions answered  Ice, heat, and/or modalities as beneficial  Call or notify for any adverse effect from injection therapy    Recommendations/Plan:  Exercise, medications, injections, other patient advice, and return appointment as noted.  Patient is encouraged to call or return for any issues or concerns.    Return in about 1 week (around 7/15/2024) for Supartz # 2 left knee.  Patient agreeable to call or return sooner for any concerns.    I have very low suspicion for septic etiology.  I will send the aspirate to lab to check for a cell count and uric  acid crystals as well as a wound culture.    Patient is instructed to rest the knee today and apply ice as needed.  BMI is within normal parameters. No other follow-up for BMI required.              EMR Dragon-transcription disclaimer:  This encounter note is an electronic transcription of spoken language to printed text.  Electronic transcription of spoken language may permit erroneous or at times nonsensical words or phrases to be inadvertently transcribed.  Although I have reviewed the note for such errors, some may still exist

## 2024-07-09 LAB — CRYSTALS FLD MICRO: NORMAL

## 2024-07-11 ENCOUNTER — TELEPHONE (OUTPATIENT)
Dept: ORTHOPEDIC SURGERY | Facility: CLINIC | Age: 89
End: 2024-07-11
Payer: MEDICARE

## 2024-07-11 NOTE — PROGRESS NOTES
Please inform patient that her knee aspirate results did not reveal gout or infection. It appears to be inflammation ( Arthritis)

## 2024-07-11 NOTE — TELEPHONE ENCOUNTER
----- Message from Gil Baumann sent at 7/11/2024  8:12 AM EDT -----  Please inform patient that her knee aspirate results did not reveal gout or infection. It appears to be inflammation ( Arthritis)

## 2024-07-13 LAB
BACTERIA FLD CULT: NORMAL
BACTERIA SPEC ANAEROBE CULT: NORMAL
GRAM STN SPEC: NORMAL
GRAM STN SPEC: NORMAL

## 2024-07-15 ENCOUNTER — OFFICE VISIT (OUTPATIENT)
Dept: ORTHOPEDIC SURGERY | Facility: CLINIC | Age: 89
End: 2024-07-15
Payer: MEDICARE

## 2024-07-15 VITALS — HEIGHT: 63 IN | BODY MASS INDEX: 21.79 KG/M2 | WEIGHT: 123 LBS | TEMPERATURE: 98.1 F

## 2024-07-15 DIAGNOSIS — M17.12 PRIMARY OSTEOARTHRITIS OF LEFT KNEE: ICD-10-CM

## 2024-07-15 DIAGNOSIS — M25.562 ARTHRALGIA OF LEFT KNEE: Primary | ICD-10-CM

## 2024-07-15 PROCEDURE — 1160F RVW MEDS BY RX/DR IN RCRD: CPT | Performed by: PHYSICIAN ASSISTANT

## 2024-07-15 PROCEDURE — 20610 DRAIN/INJ JOINT/BURSA W/O US: CPT | Performed by: PHYSICIAN ASSISTANT

## 2024-07-15 PROCEDURE — 1159F MED LIST DOCD IN RCRD: CPT | Performed by: PHYSICIAN ASSISTANT

## 2024-07-15 NOTE — PROGRESS NOTES
Subjective   Fanta Rodney is a 88 y.o. female here today for injection therapy.    Chief Complaint   Patient presents with    Left Knee - Injections     Supartz #2   Patient presents for left knee Supartz injection #2.    Past Medical History:   Diagnosis Date    Anxiety     Arthritis     Bilateral cataracts     HISTORY OF HAS HAD REMOVED    Body piercing     EARS ONLY    Choledocholithiasis     Disease of thyroid gland     GERD (gastroesophageal reflux disease)     History of esophagogastroduodenoscopy (EGD) 09/06/2013    History of toe fracture 10/2012    Left middle toe    Knee swelling several years ago    Murmur     Nausea with vomiting     Osteoarthritis     Stress fracture of metatarsal bone 06/02/2013    Left Foot - Treated by Dr. Manley    Tear of meniscus of knee     Urinary tract infection     Wears glasses     Wears partial dentures     LOWER ONLY         Past Surgical History:   Procedure Laterality Date    ADENOIDECTOMY      CATARACT EXTRACTION Right 08/31/2016    Dr. Michele Damian    CATARACT EXTRACTION Left 09/29/2016    Dr. Michele Damian     CHOLECYSTECTOMY  09/27/2013    Dr. Alexis Lobo    COLONOSCOPY  02/08/2013    Dr. Alexis Gary    DILATION AND CURETTAGE, DIAGNOSTIC / THERAPEUTIC  1967    ENDOSCOPY      FINGER SURGERY Right 08/17/2006    2 fingers - joint replacement - Dr. Suh - [place]    HAND SURGERY  Aug. 17, 2006    Joint replacement on two fingers    JOINT REPLACEMENT  Aug. 17,2006-July24,2018    Two fingers in right hand-Knee replacement    NECK SURGERY  Oct. 9, 1967    Throidectomy    THYROIDECTOMY  10/09/1967    JAMIL Pantoja - Diseased Thyroid (Goiter)    TONSILLECTOMY      TOTAL KNEE ARTHROPLASTY Right 07/24/2018    Procedure: Elective right total knee replacement (BIOMET);  Surgeon: Oscar Snow MD;  Location: [facility];  Service: Orthopedics       Allergies   Allergen Reactions    Tomato Swelling     Fresh tomatoes.     Nexium [Esomeprazole Magnesium]  "GI Intolerance       Objective   Temp 98.1 °F (36.7 °C)   Ht 160 cm (62.99\")   Wt 55.8 kg (123 lb)   BMI 21.80 kg/m²    Physical Exam    Skin exam stable with no erythema, ecchymosis or rash.  No new swelling.  No motor or sensory changes.  Distal pulse intact.    Large Joint Arthrocentesis: L knee  Date/Time: 7/15/2024 2:54 PM  Consent given by: patient  Site marked: site marked  Timeout: Immediately prior to procedure a time out was called to verify the correct patient, procedure, equipment, support staff and site/side marked as required   Supporting Documentation  Indications: pain   Procedure Details  Location: knee - L knee  Preparation: Patient was prepped and draped in the usual sterile fashion  Needle gauge: 21g.  Approach: anterolateral  Medications administered: 25 mg sodium hyaluronate 25 MG/2.5ML  Patient tolerance: patient tolerated the procedure well with no immediate complications        Assessment & Plan      Diagnosis Plan   1. Arthralgia of left knee        2. Primary osteoarthritis of left knee            Regular exercise as tolerated  Ice, heat, and/or modalities as beneficial  Watch for signs and symptoms of infection  Call or notify for any adverse effect from injection therapy    Recommendations:  I did provide the patient with an Ace wrap to apply to the left knee as needed during the day to help prevent pain and swelling.    Patient agreeable to call or return sooner for any concerns.       "

## 2024-07-22 ENCOUNTER — OFFICE VISIT (OUTPATIENT)
Dept: ORTHOPEDIC SURGERY | Facility: CLINIC | Age: 89
End: 2024-07-22
Payer: MEDICARE

## 2024-07-22 VITALS
HEIGHT: 63 IN | SYSTOLIC BLOOD PRESSURE: 122 MMHG | WEIGHT: 123 LBS | TEMPERATURE: 98 F | BODY MASS INDEX: 21.79 KG/M2 | DIASTOLIC BLOOD PRESSURE: 70 MMHG

## 2024-07-22 DIAGNOSIS — M17.12 PRIMARY OSTEOARTHRITIS OF LEFT KNEE: ICD-10-CM

## 2024-07-22 DIAGNOSIS — M25.562 ARTHRALGIA OF LEFT KNEE: Primary | ICD-10-CM

## 2024-07-22 PROCEDURE — 1159F MED LIST DOCD IN RCRD: CPT | Performed by: PHYSICIAN ASSISTANT

## 2024-07-22 PROCEDURE — 1160F RVW MEDS BY RX/DR IN RCRD: CPT | Performed by: PHYSICIAN ASSISTANT

## 2024-07-22 PROCEDURE — 20610 DRAIN/INJ JOINT/BURSA W/O US: CPT | Performed by: PHYSICIAN ASSISTANT

## 2024-07-22 NOTE — PROGRESS NOTES
Subjective   Fanta Rodney is a 88 y.o. female here today for injection therapy.    Chief Complaint   Patient presents with    Left Knee - Injections     Supartz #3   Patient presents for left knee visco injection # 3      Past Medical History:   Diagnosis Date    Anxiety     Arthritis     Bilateral cataracts     HISTORY OF HAS HAD REMOVED    Body piercing     EARS ONLY    Choledocholithiasis     Disease of thyroid gland     GERD (gastroesophageal reflux disease)     History of esophagogastroduodenoscopy (EGD) 09/06/2013    History of toe fracture 10/2012    Left middle toe    Knee swelling several years ago    Murmur     Nausea with vomiting     Osteoarthritis     Stress fracture of metatarsal bone 06/02/2013    Left Foot - Treated by Dr. Manley    Tear of meniscus of knee     Urinary tract infection     Wears glasses     Wears partial dentures     LOWER ONLY         Past Surgical History:   Procedure Laterality Date    ADENOIDECTOMY      CATARACT EXTRACTION Right 08/31/2016    Dr. Michele Damian    CATARACT EXTRACTION Left 09/29/2016    Dr. Michele Damian     CHOLECYSTECTOMY  09/27/2013    Dr. Alexis Lobo    COLONOSCOPY  02/08/2013    Dr. Alexis Gary    DILATION AND CURETTAGE, DIAGNOSTIC / THERAPEUTIC  1967    ENDOSCOPY      FINGER SURGERY Right 08/17/2006    2 fingers - joint replacement - Dr. Suh - [place]    HAND SURGERY  Aug. 17, 2006    Joint replacement on two fingers    JOINT REPLACEMENT  Aug. 17,2006-July24,2018    Two fingers in right hand-Knee replacement    NECK SURGERY  Oct. 9, 1967    Throidectomy    THYROIDECTOMY  10/09/1967    JAMIL Pantoja - Diseased Thyroid (Goiter)    TONSILLECTOMY      TOTAL KNEE ARTHROPLASTY Right 07/24/2018    Procedure: Elective right total knee replacement (BIOMET);  Surgeon: Oscar Snow MD;  Location: [facility];  Service: Orthopedics       Allergies   Allergen Reactions    Tomato Swelling     Fresh tomatoes.     Nexium [Esomeprazole Magnesium]  "GI Intolerance       Objective   /70   Temp 98 °F (36.7 °C)   Ht 160 cm (62.99\")   Wt 55.8 kg (123 lb)   BMI 21.80 kg/m²    Physical Exam    Skin exam stable with no erythema, ecchymosis or rash.  No new swelling.  No motor or sensory changes.  Distal pulse intact.    Large Joint Arthrocentesis: L knee  Date/Time: 7/22/2024 3:13 PM  Consent given by: patient  Site marked: site marked  Timeout: Immediately prior to procedure a time out was called to verify the correct patient, procedure, equipment, support staff and site/side marked as required   Supporting Documentation  Indications: pain   Procedure Details  Location: knee - L knee  Preparation: Patient was prepped and draped in the usual sterile fashion  Needle gauge: 21 G.  Approach: anterolateral  Medications administered: 25 mg sodium hyaluronate 25 MG/2.5ML  Patient tolerance: patient tolerated the procedure well with no immediate complications          Assessment & Plan      Diagnosis Plan   1. Arthralgia of left knee        2. Primary osteoarthritis of left knee            Regular exercise as tolerated  Ice, heat, and/or modalities as beneficial  Watch for signs and symptoms of infection  Call or notify for any adverse effect from injection therapy    Recommendations:  Follow up in 1 week      Patient agreeable to call or return sooner for any concerns.       "

## 2024-07-26 LAB
ALBUMIN SERPL-MCNC: 4 G/DL (ref 3.5–5.2)
ALBUMIN/GLOB SERPL: 1.5 G/DL
ALP SERPL-CCNC: 66 U/L (ref 39–117)
ALT SERPL-CCNC: 11 U/L (ref 1–33)
AST SERPL-CCNC: 17 U/L (ref 1–32)
BILIRUB SERPL-MCNC: 0.6 MG/DL (ref 0–1.2)
BUN SERPL-MCNC: 11 MG/DL (ref 8–23)
BUN/CREAT SERPL: 12.8 (ref 7–25)
CALCIUM SERPL-MCNC: 9.5 MG/DL (ref 8.6–10.5)
CHLORIDE SERPL-SCNC: 107 MMOL/L (ref 98–107)
CHOLEST SERPL-MCNC: 120 MG/DL (ref 0–200)
CO2 SERPL-SCNC: 27.6 MMOL/L (ref 22–29)
CREAT SERPL-MCNC: 0.86 MG/DL (ref 0.57–1)
EGFRCR SERPLBLD CKD-EPI 2021: 65.9 ML/MIN/1.73
ERYTHROCYTE [DISTWIDTH] IN BLOOD BY AUTOMATED COUNT: 13 % (ref 12.3–15.4)
GLOBULIN SER CALC-MCNC: 2.6 GM/DL
GLUCOSE SERPL-MCNC: 89 MG/DL (ref 65–99)
HCT VFR BLD AUTO: 42.8 % (ref 34–46.6)
HDLC SERPL-MCNC: 56 MG/DL (ref 40–60)
HGB BLD-MCNC: 13.8 G/DL (ref 12–15.9)
LDLC SERPL CALC-MCNC: 47 MG/DL (ref 0–100)
MCH RBC QN AUTO: 30.7 PG (ref 26.6–33)
MCHC RBC AUTO-ENTMCNC: 32.2 G/DL (ref 31.5–35.7)
MCV RBC AUTO: 95.1 FL (ref 79–97)
PLATELET # BLD AUTO: 262 10*3/MM3 (ref 140–450)
POTASSIUM SERPL-SCNC: 4.8 MMOL/L (ref 3.5–5.2)
PROT SERPL-MCNC: 6.6 G/DL (ref 6–8.5)
RBC # BLD AUTO: 4.5 10*6/MM3 (ref 3.77–5.28)
SODIUM SERPL-SCNC: 143 MMOL/L (ref 136–145)
TRIGL SERPL-MCNC: 89 MG/DL (ref 0–150)
TSH SERPL DL<=0.005 MIU/L-ACNC: 1.37 UIU/ML (ref 0.27–4.2)
VLDLC SERPL CALC-MCNC: 17 MG/DL (ref 5–40)
WBC # BLD AUTO: 6.8 10*3/MM3 (ref 3.4–10.8)

## 2024-07-30 ENCOUNTER — OFFICE VISIT (OUTPATIENT)
Dept: ORTHOPEDIC SURGERY | Facility: CLINIC | Age: 89
End: 2024-07-30
Payer: MEDICARE

## 2024-07-30 ENCOUNTER — OFFICE VISIT (OUTPATIENT)
Dept: INTERNAL MEDICINE | Facility: CLINIC | Age: 89
End: 2024-07-30
Payer: MEDICARE

## 2024-07-30 VITALS — BODY MASS INDEX: 21.97 KG/M2 | TEMPERATURE: 98.2 F | WEIGHT: 124 LBS | HEIGHT: 63 IN

## 2024-07-30 VITALS
DIASTOLIC BLOOD PRESSURE: 73 MMHG | SYSTOLIC BLOOD PRESSURE: 130 MMHG | OXYGEN SATURATION: 97 % | BODY MASS INDEX: 21.97 KG/M2 | HEIGHT: 63 IN | HEART RATE: 96 BPM | RESPIRATION RATE: 20 BRPM | TEMPERATURE: 98.2 F | WEIGHT: 124 LBS

## 2024-07-30 DIAGNOSIS — M25.562 PAIN IN JOINT OF LEFT KNEE: ICD-10-CM

## 2024-07-30 DIAGNOSIS — M17.12 PRIMARY OSTEOARTHRITIS OF LEFT KNEE: Primary | ICD-10-CM

## 2024-07-30 DIAGNOSIS — E78.2 MIXED HYPERLIPIDEMIA: Primary | ICD-10-CM

## 2024-07-30 DIAGNOSIS — E03.8 ADULT ONSET HYPOTHYROIDISM: ICD-10-CM

## 2024-07-30 PROCEDURE — 1159F MED LIST DOCD IN RCRD: CPT | Performed by: PHYSICIAN ASSISTANT

## 2024-07-30 PROCEDURE — 99214 OFFICE O/P EST MOD 30 MIN: CPT | Performed by: INTERNAL MEDICINE

## 2024-07-30 PROCEDURE — 1126F AMNT PAIN NOTED NONE PRSNT: CPT | Performed by: INTERNAL MEDICINE

## 2024-07-30 PROCEDURE — G2211 COMPLEX E/M VISIT ADD ON: HCPCS | Performed by: INTERNAL MEDICINE

## 2024-07-30 PROCEDURE — 20610 DRAIN/INJ JOINT/BURSA W/O US: CPT | Performed by: PHYSICIAN ASSISTANT

## 2024-07-30 PROCEDURE — 1160F RVW MEDS BY RX/DR IN RCRD: CPT | Performed by: PHYSICIAN ASSISTANT

## 2024-07-30 NOTE — PROGRESS NOTES
"Chief Complaint  Hyperlipidemia (Follow up on lab results) and Hypothyroidism    Subjective        Fanta Rodney presents to [facility]  HPI: Patient is here to follow up  on the cholesterol and  on thyroid and  had  lab work done .  The patient also needs refills on medications .  She also complains of chronic left knee joint injection and has been seeing orthopedist and getting gel injections with not much relief in her symptoms  Hyperlipidemia   Pertinent negatives include no chest pain or shortness of breath.            Objective   Vital Signs:  /73   Pulse 96   Temp 98.2 °F (36.8 °C)   Resp 20   Ht 160 cm (62.99\")   Wt 56.2 kg (124 lb)   SpO2 97%   BMI 21.97 kg/m²   Estimated body mass index is 21.97 kg/m² as calculated from the following:    Height as of this encounter: 160 cm (62.99\").    Weight as of this encounter: 56.2 kg (124 lb).       BMI is within normal parameters. No other follow-up for BMI required.      Physical Exam  Vitals and nursing note reviewed.   Constitutional:       General: She is not in acute distress.     Appearance: Normal appearance. She is not diaphoretic.   HENT:      Head: Normocephalic and atraumatic.      Right Ear: External ear normal.      Left Ear: External ear normal.      Nose: Nose normal.   Eyes:      Extraocular Movements: Extraocular movements intact.      Conjunctiva/sclera: Conjunctivae normal.   Neck:      Trachea: Trachea normal.   Cardiovascular:      Rate and Rhythm: Normal rate and regular rhythm.      Heart sounds: Normal heart sounds.   Pulmonary:      Effort: Pulmonary effort is normal. No respiratory distress.      Breath sounds: Normal breath sounds.   Abdominal:      General: Abdomen is flat.   Musculoskeletal:         General: Deformity present.      Cervical back: Neck supple.      Comments: Moves all limbs   Skin:     General: Skin is warm.   Neurological:      Mental Status: She is alert and oriented to " person, place, and time.      Comments: No gross motor or sensory deficits        Result Review :    The following data was reviewed by: Tiera Freed MD on 07/30/2024:  Common labs          10/18/2023    10:39 4/12/2024    09:18 7/26/2024    09:07   Common Labs   Glucose 91  88  89    BUN 19  14  11    Creatinine 0.91  0.91  0.86    Sodium 139  141  143    Potassium 4.4  4.4  4.8    Chloride 103  105  107    Calcium 10.0  9.2  9.5    Total Protein 7.1  6.8  6.6    Albumin 4.5  3.9  4.0    Total Bilirubin 0.7  0.7  0.6    Alkaline Phosphatase 74  70  66    AST (SGOT) 18  17  17    ALT (SGPT) 12  10  11    WBC 6.68  5.94  6.80    Hemoglobin 14.4  13.9  13.8    Hematocrit 42.7  44.2  42.8    Platelets 250  274  262    Total Cholesterol 210  181  120    Triglycerides 115  114  89    HDL Cholesterol 57  52  56    LDL Cholesterol  133  109  47                   Assessment and Plan     Diagnoses and all orders for this visit:    1. Mixed hyperlipidemia (Primary)  -     CBC (No Diff)  -     Comprehensive Metabolic Panel  -     Lipid Panel    2. Adult onset hypothyroidism  -     TSH    3. Pain in joint of left knee      Plan:  1.   mixed hyperlipidemia:  reviewed  fasting CMP and lipid panel.  Diet and exercise counseled,  Will continue current medications   2. hypothyroidism:  reviewed  tsh , and continue levothyroxine  3.  Chronic left knee joint pain: To continue with orthopedist, OTC Tylenol and topical medications         Follow Up     Return in about 3 months (around 11/4/2024).  Patient was given instructions and counseling regarding her condition or for health maintenance advice. Please see specific information pulled into the AVS if appropriate.

## 2024-07-30 NOTE — PROGRESS NOTES
Subjective   Fanta Rodney is a 88 y.o. female here today for injection therapy.    Chief Complaint   Patient presents with    Left Knee - Injections     Supartz #4   Patient presents for Visco injection #4 -left knee    Past Medical History:   Diagnosis Date    Anxiety     Arthritis     Bilateral cataracts     HISTORY OF HAS HAD REMOVED    Body piercing     EARS ONLY    Choledocholithiasis     Disease of thyroid gland     GERD (gastroesophageal reflux disease)     History of esophagogastroduodenoscopy (EGD) 09/06/2013    History of toe fracture 10/2012    Left middle toe    Knee swelling several years ago    Murmur     Nausea with vomiting     Osteoarthritis     Stress fracture of metatarsal bone 06/02/2013    Left Foot - Treated by Dr. Manley    Tear of meniscus of knee     Urinary tract infection     Wears glasses     Wears partial dentures     LOWER ONLY         Past Surgical History:   Procedure Laterality Date    ADENOIDECTOMY      CATARACT EXTRACTION Right 08/31/2016    Dr. Michele Damian    CATARACT EXTRACTION Left 09/29/2016    Dr. Michele Damian     CHOLECYSTECTOMY  09/27/2013    Dr. Alexis Lobo    COLONOSCOPY  02/08/2013    Dr. Alxeis Gary    DILATION AND CURETTAGE, DIAGNOSTIC / THERAPEUTIC  1967    ENDOSCOPY      FINGER SURGERY Right 08/17/2006    2 fingers - joint replacement - Dr. Suh - [place]    HAND SURGERY  Aug. 17, 2006    Joint replacement on two fingers    JOINT REPLACEMENT  Aug. 17,2006-July24,2018    Two fingers in right hand-Knee replacement    NECK SURGERY  Oct. 9, 1967    Throidectomy    THYROIDECTOMY  10/09/1967    JAMIL Pantoja - Diseased Thyroid (Goiter)    TONSILLECTOMY      TOTAL KNEE ARTHROPLASTY Right 07/24/2018    Procedure: Elective right total knee replacement (BIOMET);  Surgeon: Oscar Snow MD;  Location: [facility];  Service: Orthopedics       Allergies   Allergen Reactions    Tomato Swelling     Fresh tomatoes.     Nexium [Esomeprazole Magnesium]  "GI Intolerance       Objective   Temp 98.2 °F (36.8 °C)   Ht 160 cm (62.99\")   Wt 56.2 kg (124 lb)   BMI 21.97 kg/m²    Physical Exam    Skin exam stable with no erythema, ecchymosis or rash.  No new swelling.  No motor or sensory changes.  Distal pulse intact.    Large Joint Arthrocentesis: L knee  Date/Time: 7/30/2024 1:30 PM  Consent given by: patient  Site marked: site marked  Timeout: Immediately prior to procedure a time out was called to verify the correct patient, procedure, equipment, support staff and site/side marked as required   Supporting Documentation  Indications: pain   Procedure Details  Location: knee - L knee  Preparation: Patient was prepped and draped in the usual sterile fashion  Needle gauge: 21g.  Medications administered: 25 mg sodium hyaluronate 25 MG/2.5ML  Patient tolerance: patient tolerated the procedure well with no immediate complications        Assessment & Plan      Diagnosis Plan   1. Primary osteoarthritis of left knee            Regular exercise as tolerated  Ice, heat, and/or modalities as beneficial  Watch for signs and symptoms of infection  Call or notify for any adverse effect from injection therapy    Recommendations:  Follow up in 1 week      Patient agreeable to call or return sooner for any concerns.       "

## 2024-08-06 ENCOUNTER — OFFICE VISIT (OUTPATIENT)
Dept: ORTHOPEDIC SURGERY | Facility: CLINIC | Age: 89
End: 2024-08-06
Payer: MEDICARE

## 2024-08-06 VITALS
BODY MASS INDEX: 21.97 KG/M2 | DIASTOLIC BLOOD PRESSURE: 70 MMHG | HEIGHT: 63 IN | SYSTOLIC BLOOD PRESSURE: 118 MMHG | WEIGHT: 124 LBS

## 2024-08-06 DIAGNOSIS — M17.12 PRIMARY OSTEOARTHRITIS OF LEFT KNEE: Primary | ICD-10-CM

## 2024-08-06 PROCEDURE — 20610 DRAIN/INJ JOINT/BURSA W/O US: CPT | Performed by: PHYSICIAN ASSISTANT

## 2024-08-06 NOTE — PROGRESS NOTES
Subjective   Fanta Rodney is a 88 y.o. female here today for injection therapy.    Chief Complaint   Patient presents with    Left Knee - Injections     Supartz #5    Patient presents for fifth knee Visco injection #5.    Past Medical History:   Diagnosis Date    Anxiety     Arthritis     Bilateral cataracts     HISTORY OF HAS HAD REMOVED    Body piercing     EARS ONLY    Choledocholithiasis     Disease of thyroid gland     GERD (gastroesophageal reflux disease)     History of esophagogastroduodenoscopy (EGD) 09/06/2013    History of toe fracture 10/2012    Left middle toe    Knee swelling several years ago    Murmur     Nausea with vomiting     Osteoarthritis     Stress fracture of metatarsal bone 06/02/2013    Left Foot - Treated by Dr. Manley    Tear of meniscus of knee     Urinary tract infection     Wears glasses     Wears partial dentures     LOWER ONLY         Past Surgical History:   Procedure Laterality Date    ADENOIDECTOMY      CATARACT EXTRACTION Right 08/31/2016    Dr. Michele Damian    CATARACT EXTRACTION Left 09/29/2016    Dr. Michele Damian     CHOLECYSTECTOMY  09/27/2013    Dr. Alexis Lobo    COLONOSCOPY  02/08/2013    Dr. Alexis Gary    DILATION AND CURETTAGE, DIAGNOSTIC / THERAPEUTIC  1967    ENDOSCOPY      FINGER SURGERY Right 08/17/2006    2 fingers - joint replacement - Dr. Suh - [place], [place]    HAND SURGERY  Aug. 17, 2006    Joint replacement on two fingers    JOINT REPLACEMENT  Aug. 17,2006-July24,2018    Two fingers in right hand-Knee replacement    NECK SURGERY  Oct. 9, 1967    Throidectomy    THYROIDECTOMY  10/09/1967    JAMIL Pantoja - Diseased Thyroid (Goiter)    TONSILLECTOMY      TOTAL KNEE ARTHROPLASTY Right 07/24/2018    Procedure: Elective right total knee replacement (BIOMET);  Surgeon: Oscar Snow MD;  Location: [facility];  Service: Orthopedics       Allergies   Allergen Reactions    Tomato Swelling     Fresh tomatoes.     Nexium [Esomeprazole Magnesium]  "GI Intolerance       Objective   /70 (BP Location: Left arm, Patient Position: Sitting, Cuff Size: Adult)   Ht 160 cm (62.99\")   Wt 56.2 kg (124 lb)   BMI 21.97 kg/m²    Physical Exam    Skin exam stable with no erythema, ecchymosis or rash.  No new swelling.  No motor or sensory changes.  Distal pulse intact.    Large Joint Arthrocentesis: L knee  Date/Time: 8/6/2024 3:05 PM  Consent given by: patient  Site marked: site marked  Timeout: Immediately prior to procedure a time out was called to verify the correct patient, procedure, equipment, support staff and site/side marked as required   Supporting Documentation  Indications: pain   Procedure Details  Location: knee - L knee  Preparation: Patient was prepped and draped in the usual sterile fashion  Needle gauge: 21g.  Approach: anteromedial  Medications administered: 25 mg sodium hyaluronate 25 MG/2.5ML  Patient tolerance: patient tolerated the procedure well with no immediate complications        Assessment & Plan     No diagnosis found.    Regular exercise as tolerated  Ice, heat, and/or modalities as beneficial  Watch for signs and symptoms of infection  Call or notify for any adverse effect from injection therapy    Recommendations:    Follow up in 6 months or sooner if needed or no improvement    Patient agreeable to call or return sooner for any concerns.       "

## 2024-09-11 ENCOUNTER — OFFICE VISIT (OUTPATIENT)
Dept: ORTHOPEDIC SURGERY | Facility: CLINIC | Age: 89
End: 2024-09-11
Payer: MEDICARE

## 2024-09-11 VITALS
WEIGHT: 124 LBS | SYSTOLIC BLOOD PRESSURE: 114 MMHG | DIASTOLIC BLOOD PRESSURE: 68 MMHG | BODY MASS INDEX: 21.97 KG/M2 | HEIGHT: 63 IN

## 2024-09-11 DIAGNOSIS — M17.12 PRIMARY OSTEOARTHRITIS OF LEFT KNEE: Primary | ICD-10-CM

## 2024-09-11 DIAGNOSIS — M25.562 ARTHRALGIA OF LEFT KNEE: ICD-10-CM

## 2024-09-11 PROCEDURE — 1159F MED LIST DOCD IN RCRD: CPT | Performed by: PHYSICIAN ASSISTANT

## 2024-09-11 PROCEDURE — 1160F RVW MEDS BY RX/DR IN RCRD: CPT | Performed by: PHYSICIAN ASSISTANT

## 2024-09-11 PROCEDURE — 99213 OFFICE O/P EST LOW 20 MIN: CPT | Performed by: PHYSICIAN ASSISTANT

## 2024-09-11 PROCEDURE — 20610 DRAIN/INJ JOINT/BURSA W/O US: CPT | Performed by: PHYSICIAN ASSISTANT

## 2024-09-11 RX ORDER — TRIAMCINOLONE ACETONIDE 40 MG/ML
40 INJECTION, SUSPENSION INTRA-ARTICULAR; INTRAMUSCULAR
Status: COMPLETED | OUTPATIENT
Start: 2024-09-11 | End: 2024-09-11

## 2024-09-11 RX ORDER — LIDOCAINE HYDROCHLORIDE 20 MG/ML
2 INJECTION, SOLUTION INFILTRATION; PERINEURAL
Status: COMPLETED | OUTPATIENT
Start: 2024-09-11 | End: 2024-09-11

## 2024-09-11 RX ADMIN — TRIAMCINOLONE ACETONIDE 40 MG: 40 INJECTION, SUSPENSION INTRA-ARTICULAR; INTRAMUSCULAR at 10:14

## 2024-09-11 RX ADMIN — LIDOCAINE HYDROCHLORIDE 2 ML: 20 INJECTION, SOLUTION INFILTRATION; PERINEURAL at 10:14

## 2024-09-11 NOTE — PROGRESS NOTES
"Subjective   Patient ID: Fanta Rodney is a 88 y.o. right hand dominant female  Pain of the Left Knee (States the injection she had last month helped a little but she is still having pain and swelling.)         Patient presents with continued left knee pain that was not alleviated with the most recent series of viscosupplementation injections.  She states the knee is still stiff and tender.  When she goes from a seated to standing position she has to wait several seconds and move \"gingerly\"  Patient has had Depo-Medrol in the past in her left knee but does not recall obtaining significant relief.  She also recently completed a series of 5 viscosupplementation injections without improvement.      Past Medical History:   Diagnosis Date    Anxiety     Arthritis     Bilateral cataracts     HISTORY OF HAS HAD REMOVED    Body piercing     EARS ONLY    Choledocholithiasis     Disease of thyroid gland     GERD (gastroesophageal reflux disease)     History of esophagogastroduodenoscopy (EGD) 09/06/2013    History of toe fracture 10/2012    Left middle toe    Knee swelling several years ago    Murmur     Nausea with vomiting     Osteoarthritis     Stress fracture of metatarsal bone 06/02/2013    Left Foot - Treated by Dr. Manley    Tear of meniscus of knee     Urinary tract infection     Wears glasses     Wears partial dentures     LOWER ONLY        Past Surgical History:   Procedure Laterality Date    ADENOIDECTOMY      CATARACT EXTRACTION Right 08/31/2016    Dr. Michele Damian    CATARACT EXTRACTION Left 09/29/2016    Dr. Michele Damian     CHOLECYSTECTOMY  09/27/2013    Dr. Alexis Lobo    COLONOSCOPY  02/08/2013    Dr. Alexis Gary    DILATION AND CURETTAGE, DIAGNOSTIC / THERAPEUTIC  1967    ENDOSCOPY      FINGER SURGERY Right 08/17/2006    2 fingers - joint replacement - Dr. Suh - [place]    HAND SURGERY  Aug. 17, 2006    Joint replacement on two fingers    JOINT REPLACEMENT  Aug. 17,2006-July24,2018    Two fingers in " right hand-Knee replacement    NECK SURGERY  Oct. 9, 1967    Throidectomy    THYROIDECTOMY  10/09/1967    JAMIL Pantoja - Diseased Thyroid (Goiter)    TONSILLECTOMY      TOTAL KNEE ARTHROPLASTY Right 07/24/2018    Procedure: Elective right total knee replacement (BIOMET);  Surgeon: Oscar Snow MD;  Location: [facility];  Service: Orthopedics       Family History   Problem Relation Age of Onset    Colon cancer Mother         diagnosed in her late 80's    Cancer Mother     Osteoarthritis Mother     Cancer Father     Prostate cancer Father        Social History     Socioeconomic History    Marital status:    Tobacco Use    Smoking status: Never    Smokeless tobacco: Never   Vaping Use    Vaping status: Never Used   Substance and Sexual Activity    Alcohol use: Not Currently     Comment: OCCASIONALLY    Drug use: No    Sexual activity: Not Currently     Partners: Male         Current Outpatient Medications:     acetaminophen (TYLENOL) 650 MG 8 hr tablet, Take 1 tablet by mouth Every 8 (Eight) Hours As Needed for Mild Pain., Disp: , Rfl:     Ascorbic Acid (VITAMIN C) 500 MG capsule, Take  by mouth daily., Disp: , Rfl:     aspirin (Aspirin Low Dose) 81 MG EC tablet, , Disp: , Rfl:     calcium carbonate (OS-MIRZA) 600 MG tablet, Take 1 tablet by mouth Daily., Disp: , Rfl:     Cholecalciferol 25 MCG (1000 UT) tablet, Take 1 tablet by mouth Daily., Disp: , Rfl:     levothyroxine (SYNTHROID, LEVOTHROID) 100 MCG tablet, Take 1 tablet by mouth Daily., Disp: 90 tablet, Rfl: 1    rosuvastatin (Crestor) 10 MG tablet, Take 1 tablet by mouth Every Night., Disp: 90 tablet, Rfl: 1    TURMERIC PO, Take 1,000 mg by mouth Daily., Disp: , Rfl:     Allergies   Allergen Reactions    Tomato Swelling     Fresh tomatoes.     Nexium [Esomeprazole Magnesium] GI Intolerance       Review of Systems   Musculoskeletal:  Positive for arthralgias (left knee) and joint swelling (left knee).       I have reviewed the  "medical and surgical history, family history, social history, medications, and/or allergies, and the review of systems of this report.    Objective   /68   Ht 160 cm (62.99\")   Wt 56.2 kg (124 lb)   BMI 21.97 kg/m²    Physical Exam  Vitals and nursing note reviewed.   Constitutional:       Appearance: Normal appearance.   Pulmonary:      Effort: Pulmonary effort is normal.   Musculoskeletal:      Left knee: Swelling present. No deformity, erythema or ecchymosis. Tenderness present over the medial joint line and lateral joint line.   Neurological:      Mental Status: She is alert and oriented to person, place, and time.       Ortho Exam   Extremity DVT signs are negative on physical exam with negative Rosa sign, no calf pain, no palpable cords, and no skin tone change   Neurologic Exam     Mental Status   Oriented to person, place, and time.            Assessment & Plan   Independent Review of Radiographic Studies:    No new imaging done today.  Reviewed imaging with patient at a prior visit.  Reviewed relevant prior imaging with patient again today.      Large Joint Arthrocentesis: L knee  Date/Time: 9/11/2024 10:14 AM  Consent given by: patient  Site marked: site marked  Timeout: Immediately prior to procedure a time out was called to verify the correct patient, procedure, equipment, support staff and site/side marked as required   Supporting Documentation  Indications: pain   Procedure Details  Location: knee - L knee  Preparation: Patient was prepped and draped in the usual sterile fashion  Needle size: 22 G  Approach: anteromedial  Medications administered: 40 mg triamcinolone acetonide 40 MG/ML; 2 mL lidocaine 2%  Patient tolerance: patient tolerated the procedure well with no immediate complications           Diagnoses and all orders for this visit:    1. Primary osteoarthritis of left knee (Primary)  -     Ambulatory Referral to Pain Management    2. Arthralgia of left knee  -     Ambulatory " Referral to Pain Management    Other orders  -     Large Joint Arthrocentesis: L knee       Orthopedic activities reviewed and patient expressed appreciation  Regular exercise as tolerated  Risk, benefits, and merits of treatment alternatives reviewed with the patient and questions answered  Ice, heat, and/or modalities as beneficial  Watch for signs and symptoms of infection  Call or notify for any adverse effect from injection therapy    Recommendations/Plan:  Exercise, medications, injections, other patient advice, and return appointment as noted.  Patient is encouraged to call or return for any issues or concerns.      May follow up PRN or in 3 months    Patient agreeable to call or return sooner for any concerns.  We discussed the option of a Kenalog cortisone injection as this would provide a different type of steroid, we also discussed the option of total knee arthroplasty although given her advanced age she and I both are somewhat apprehensive about proceeding with such an invasive procedure.  We even discussed referral to pain management to discuss possible genicular nerve block.  Patient would like to try the Kenalog injection today with a referral to pain management.    BMI is within normal parameters. No other follow-up for BMI required.            EMR Dragon-transcription disclaimer:  This encounter note is an electronic transcription of spoken language to printed text.  Electronic transcription of spoken language may permit erroneous or at times nonsensical words or phrases to be inadvertently transcribed.  Although I have reviewed the note for such errors, some may still exist

## 2024-10-14 DIAGNOSIS — E78.2 MIXED HYPERLIPIDEMIA: ICD-10-CM

## 2024-10-14 RX ORDER — ROSUVASTATIN CALCIUM 10 MG/1
10 TABLET, COATED ORAL NIGHTLY
Qty: 90 TABLET | Refills: 0 | Status: SHIPPED | OUTPATIENT
Start: 2024-10-14

## 2024-10-14 NOTE — TELEPHONE ENCOUNTER
Caller: Fatna Rodney    Relationship: Self    Best call back number: [phone]    Requested Prescriptions:   Requested Prescriptions     Pending Prescriptions Disp Refills    rosuvastatin (CRESTOR) 10 MG tablet [Pharmacy Med Name: Rosuvastatin Calcium Oral Tablet 10 MG] 90 tablet 0     Sig: TAKE 1 TABLET BY MOUTH EVERY NIGHT        Pharmacy where request should be sent: [id number] - [place], [place] - [address] - [phone] [facility] [phone]      Last office visit with prescribing clinician: 7/30/2024   Last telemedicine visit with prescribing clinician: Visit date not found   Next office visit with prescribing clinician: 11/4/2024     Additional details provided by patient: IF YOU WANT HER TO STAY ON THIS MEDICATION GO AHEAD AND REFILL AS HER NEXT APPOINTMENT IS 11/04/24.    Does the patient have less than a 3 day supply:  [] Yes  [x] No         Cait Shirley MA   10/14/24 11:05 EDT

## 2024-11-01 LAB
ALBUMIN SERPL-MCNC: 3.9 G/DL (ref 3.5–5.2)
ALBUMIN/GLOB SERPL: 1.4 G/DL
ALP SERPL-CCNC: 58 U/L (ref 39–117)
ALT SERPL-CCNC: 15 U/L (ref 1–33)
AST SERPL-CCNC: 21 U/L (ref 1–32)
BILIRUB SERPL-MCNC: 0.6 MG/DL (ref 0–1.2)
BUN SERPL-MCNC: 17 MG/DL (ref 8–23)
BUN/CREAT SERPL: 19.8 (ref 7–25)
CALCIUM SERPL-MCNC: 9.5 MG/DL (ref 8.6–10.5)
CHLORIDE SERPL-SCNC: 104 MMOL/L (ref 98–107)
CHOLEST SERPL-MCNC: 138 MG/DL (ref 0–200)
CO2 SERPL-SCNC: 27.1 MMOL/L (ref 22–29)
CREAT SERPL-MCNC: 0.86 MG/DL (ref 0.57–1)
EGFRCR SERPLBLD CKD-EPI 2021: 65.5 ML/MIN/1.73
ERYTHROCYTE [DISTWIDTH] IN BLOOD BY AUTOMATED COUNT: 13.5 % (ref 12.3–15.4)
GLOBULIN SER CALC-MCNC: 2.8 GM/DL
GLUCOSE SERPL-MCNC: 88 MG/DL (ref 65–99)
HCT VFR BLD AUTO: 43.7 % (ref 34–46.6)
HDLC SERPL-MCNC: 59 MG/DL (ref 40–60)
HGB BLD-MCNC: 14.4 G/DL (ref 12–15.9)
LDLC SERPL CALC-MCNC: 61 MG/DL (ref 0–100)
MCH RBC QN AUTO: 31.3 PG (ref 26.6–33)
MCHC RBC AUTO-ENTMCNC: 33 G/DL (ref 31.5–35.7)
MCV RBC AUTO: 95 FL (ref 79–97)
PLATELET # BLD AUTO: 253 10*3/MM3 (ref 140–450)
POTASSIUM SERPL-SCNC: 4.7 MMOL/L (ref 3.5–5.2)
PROT SERPL-MCNC: 6.7 G/DL (ref 6–8.5)
RBC # BLD AUTO: 4.6 10*6/MM3 (ref 3.77–5.28)
SODIUM SERPL-SCNC: 140 MMOL/L (ref 136–145)
TRIGL SERPL-MCNC: 100 MG/DL (ref 0–150)
TSH SERPL DL<=0.005 MIU/L-ACNC: 1.11 UIU/ML (ref 0.27–4.2)
VLDLC SERPL CALC-MCNC: 18 MG/DL (ref 5–40)
WBC # BLD AUTO: 6.49 10*3/MM3 (ref 3.4–10.8)

## 2024-11-04 ENCOUNTER — OFFICE VISIT (OUTPATIENT)
Dept: INTERNAL MEDICINE | Facility: CLINIC | Age: 89
End: 2024-11-04
Payer: MEDICARE

## 2024-11-04 VITALS
OXYGEN SATURATION: 97 % | RESPIRATION RATE: 20 BRPM | TEMPERATURE: 96.7 F | HEART RATE: 87 BPM | DIASTOLIC BLOOD PRESSURE: 78 MMHG | WEIGHT: 123 LBS | SYSTOLIC BLOOD PRESSURE: 109 MMHG | HEIGHT: 63 IN | BODY MASS INDEX: 21.79 KG/M2

## 2024-11-04 DIAGNOSIS — E78.2 MIXED HYPERLIPIDEMIA: Primary | ICD-10-CM

## 2024-11-04 DIAGNOSIS — E03.8 ADULT ONSET HYPOTHYROIDISM: ICD-10-CM

## 2024-11-04 DIAGNOSIS — Z23 NEED FOR IMMUNIZATION AGAINST INFLUENZA: ICD-10-CM

## 2024-11-04 PROCEDURE — 90662 IIV NO PRSV INCREASED AG IM: CPT | Performed by: INTERNAL MEDICINE

## 2024-11-04 PROCEDURE — 99214 OFFICE O/P EST MOD 30 MIN: CPT | Performed by: INTERNAL MEDICINE

## 2024-11-04 PROCEDURE — 1160F RVW MEDS BY RX/DR IN RCRD: CPT | Performed by: INTERNAL MEDICINE

## 2024-11-04 PROCEDURE — 1159F MED LIST DOCD IN RCRD: CPT | Performed by: INTERNAL MEDICINE

## 2024-11-04 PROCEDURE — G0008 ADMIN INFLUENZA VIRUS VAC: HCPCS | Performed by: INTERNAL MEDICINE

## 2024-11-04 PROCEDURE — 1126F AMNT PAIN NOTED NONE PRSNT: CPT | Performed by: INTERNAL MEDICINE

## 2024-11-04 RX ORDER — LEVOTHYROXINE SODIUM 100 UG/1
100 TABLET ORAL DAILY
Qty: 90 TABLET | Refills: 1 | Status: SHIPPED | OUTPATIENT
Start: 2024-11-04

## 2024-11-04 RX ORDER — ROSUVASTATIN CALCIUM 10 MG/1
10 TABLET, COATED ORAL NIGHTLY
Qty: 90 TABLET | Refills: 1 | Status: SHIPPED | OUTPATIENT
Start: 2024-11-04

## 2024-11-04 NOTE — PROGRESS NOTES
"Chief Complaint  Hyperlipidemia (Follow up on lab results) and Hypothyroidism    Subjective        Fanta Rodney presents to [facility]  HPI: Patient is here to follow up on   cholesterol and   thyroid and had lab work done .  The patient also needs refills on medications and flu shot .   Hyperlipidemia   Pertinent negatives include no chest pain or shortness of breath.        Answers submitted by the patient for this visit:  Primary Reason for Visit (Submitted on 10/28/2024)  What is the primary reason for your visit?: Extremity Pain  Lower Extremity Injury Questionnaire (Submitted on 10/28/2024)  Chief Complaint: Extremity pain  Injury: No    Objective   Vital Signs:  /78   Pulse 87   Temp 96.7 °F (35.9 °C)   Resp 20   Ht 160 cm (62.99\")   Wt 55.8 kg (123 lb)   SpO2 97%   BMI 21.79 kg/m²   Estimated body mass index is 21.79 kg/m² as calculated from the following:    Height as of this encounter: 160 cm (62.99\").    Weight as of this encounter: 55.8 kg (123 lb).    BMI is within normal parameters. No other follow-up for BMI required.      Physical Exam  Vitals and nursing note reviewed.   Constitutional:       General: She is not in acute distress.     Appearance: Normal appearance. She is not diaphoretic.   HENT:      Head: Normocephalic and atraumatic.      Right Ear: External ear normal.      Left Ear: External ear normal.      Nose: Nose normal.   Eyes:      Extraocular Movements: Extraocular movements intact.      Conjunctiva/sclera: Conjunctivae normal.   Neck:      Trachea: Trachea normal.   Cardiovascular:      Rate and Rhythm: Normal rate and regular rhythm.      Heart sounds: Normal heart sounds.   Pulmonary:      Effort: Pulmonary effort is normal. No respiratory distress.      Breath sounds: Normal breath sounds.   Abdominal:      General: Abdomen is flat.   Musculoskeletal:         General: Deformity present.      Cervical back: Neck supple.      Comments: " Moves all limbs   Skin:     General: Skin is warm.   Neurological:      Mental Status: She is alert and oriented to person, place, and time.      Comments: No gross motor or sensory deficits        Result Review :  The following data was reviewed by: Tiera Freed MD on 11/04/2024:  Common labs          4/12/2024    09:18 7/26/2024    09:07 11/1/2024    08:00   Common Labs   Glucose 88  89  88    BUN 14  11  17    Creatinine 0.91  0.86  0.86    Sodium 141  143  140    Potassium 4.4  4.8  4.7    Chloride 105  107  104    Calcium 9.2  9.5  9.5    Total Protein 6.8  6.6  6.7    Albumin 3.9  4.0  3.9    Total Bilirubin 0.7  0.6  0.6    Alkaline Phosphatase 70  66  58    AST (SGOT) 17  17  21    ALT (SGPT) 10  11  15    WBC 5.94  6.80  6.49    Hemoglobin 13.9  13.8  14.4    Hematocrit 44.2  42.8  43.7    Platelets 274  262  253    Total Cholesterol 181  120  138    Triglycerides 114  89  100    HDL Cholesterol 52  56  59    LDL Cholesterol  109  47  61                Assessment and Plan   Diagnoses and all orders for this visit:    1. Mixed hyperlipidemia (Primary)  -     rosuvastatin (CRESTOR) 10 MG tablet; Take 1 tablet by mouth Every Night.  Dispense: 90 tablet; Refill: 1  -     CBC (No Diff)  -     Comprehensive Metabolic Panel  -     Lipid Panel    2. Adult onset hypothyroidism  -     levothyroxine (SYNTHROID, LEVOTHROID) 100 MCG tablet; Take 1 tablet by mouth Daily.  Dispense: 90 tablet; Refill: 1  -     TSH    3. Need for immunization against influenza  -     Fluzone High-Dose 65+yrs (9669-8608)      Plan:  1.   mixed hyperlipidemia:  reviewed  fasting CMP and lipid panel.  Diet and exercise counseled,  Will continue current medications  2. hypothyroidism:  reviewed  tsh , and continue levothyroxine  3.  Need for flu vaccine : given today and well tolerated         Follow Up   Return in about 4 months (around 3/4/2025).  Patient was given instructions and counseling regarding her condition or for health  maintenance advice. Please see specific information pulled into the AVS if appropriate.

## 2024-12-17 ENCOUNTER — OFFICE VISIT (OUTPATIENT)
Dept: INTERNAL MEDICINE | Facility: CLINIC | Age: 89
End: 2024-12-17
Payer: MEDICARE

## 2024-12-17 VITALS
OXYGEN SATURATION: 96 % | TEMPERATURE: 97.3 F | SYSTOLIC BLOOD PRESSURE: 114 MMHG | WEIGHT: 125 LBS | DIASTOLIC BLOOD PRESSURE: 76 MMHG | HEIGHT: 63 IN | RESPIRATION RATE: 20 BRPM | BODY MASS INDEX: 22.15 KG/M2 | HEART RATE: 92 BPM

## 2024-12-17 DIAGNOSIS — Z00.00 MEDICARE ANNUAL WELLNESS VISIT, SUBSEQUENT: Primary | ICD-10-CM

## 2024-12-17 PROCEDURE — 1159F MED LIST DOCD IN RCRD: CPT | Performed by: INTERNAL MEDICINE

## 2024-12-17 PROCEDURE — G0439 PPPS, SUBSEQ VISIT: HCPCS | Performed by: INTERNAL MEDICINE

## 2024-12-17 PROCEDURE — 1160F RVW MEDS BY RX/DR IN RCRD: CPT | Performed by: INTERNAL MEDICINE

## 2024-12-17 PROCEDURE — 1170F FXNL STATUS ASSESSED: CPT | Performed by: INTERNAL MEDICINE

## 2024-12-17 PROCEDURE — 1126F AMNT PAIN NOTED NONE PRSNT: CPT | Performed by: INTERNAL MEDICINE

## 2024-12-17 NOTE — PROGRESS NOTES
Subjective   The ABCs of the Annual Wellness Visit  Medicare Wellness Visit    Chief Complaint   Patient presents with    Medicare Wellness-subsequent       Fanta Rodney is a 89 y.o. patient who presents for a Medicare Wellness Visit.    The following portions of the patient's history were reviewed and   updated as appropriate: allergies, current medications, past family history, past medical history, past social history, past surgical history, and problem list.    Compared to one year ago, the patient's physical   health is the same.  Compared to one year ago, the patient's mental   health is the same.    Recent Hospitalizations:  She was not admitted to the hospital during the last year.     Current Medical Providers:  Patient Care Team:  Tiera Freed MD as PCP - General (Internal Medicine)  Ernie Carson APRN as Nurse Practitioner (Gastroenterology)  Alistair Sanabria DPM as Consulting Physician (Podiatry)  Gil Baumann PA-C as Physician Assistant (Physician Assistant)  Alisha Weinberg MD as Consulting Physician (Ophthalmology)    Outpatient Medications Prior to Visit   Medication Sig Dispense Refill    acetaminophen (TYLENOL) 650 MG 8 hr tablet Take 1 tablet by mouth Every 8 (Eight) Hours As Needed for Mild Pain.      Ascorbic Acid (VITAMIN C) 500 MG capsule Take  by mouth daily.      aspirin (Aspirin Low Dose) 81 MG EC tablet       calcium carbonate (OS-MIRZA) 600 MG tablet Take 1 tablet by mouth Daily.      Cholecalciferol 25 MCG (1000 UT) tablet Take 1 tablet by mouth Daily.      levothyroxine (SYNTHROID, LEVOTHROID) 100 MCG tablet Take 1 tablet by mouth Daily. 90 tablet 1    rosuvastatin (CRESTOR) 10 MG tablet Take 1 tablet by mouth Every Night. 90 tablet 1    TURMERIC PO Take 1,000 mg by mouth Daily.       No facility-administered medications prior to visit.     No opioid medication identified on active medication list. I have reviewed chart for other potential  high risk  "medication/s and harmful drug interactions in the elderly.      Aspirin is on active medication list. Aspirin use is indicated based on review of current medical condition/s. Pros and cons of this therapy have been discussed today. Benefits of this medication outweigh potential harm.  Patient has been encouraged to continue taking this medication.  .      Patient Active Problem List   Diagnosis    Gastroesophageal reflux disease with esophagitis    Hypothyroidism    Vitamin D deficiency    Arthritis    Seasonal allergic rhinitis due to pollen    Neck muscle spasm    Snoring    Weight loss    Loose stools    Encounter for screening for malignant neoplasm of colon    Family history of colon cancer in mother     Advance Care Planning Advance Directive is on file.  ACP discussion was held with the patient during this visit. Patient has an advance directive in EMR which is still valid.             Objective   Vitals:    12/17/24 1529   BP: 114/76   Pulse: 92   Resp: 20   Temp: 97.3 °F (36.3 °C)   SpO2: 96%   Weight: 56.7 kg (125 lb)   Height: 160 cm (62.99\")   PainSc: 0-No pain       Estimated body mass index is 22.15 kg/m² as calculated from the following:    Height as of this encounter: 160 cm (62.99\").    Weight as of this encounter: 56.7 kg (125 lb).    BMI is within normal parameters. No other follow-up for BMI required.           Does the patient have evidence of cognitive impairment? No  Lab Results   Component Value Date    CHLPL 138 11/01/2024    TRIG 100 11/01/2024    HDL 59 11/01/2024    LDL 61 11/01/2024    VLDL 18 11/01/2024                                                                                               Health  Risk Assessment    Smoking Status:  Social History     Tobacco Use   Smoking Status Never   Smokeless Tobacco Never     Alcohol Consumption:  Social History     Substance and Sexual Activity   Alcohol Use Not Currently    Comment: OCCASIONALLY       Fall Risk Screen  STEADI Fall Risk " Assessment was completed, and patient is at LOW risk for falls.Assessment completed on:2024    Depression Screening   Little interest or pleasure in doing things? Not at all   Feeling down, depressed, or hopeless? Not at all   PHQ-2 Total Score 0      Health Habits and Functional and Cognitive Screenin/17/2024     3:47 PM   Functional & Cognitive Status   Do you have difficulty preparing food and eating? No   Do you have difficulty bathing yourself, getting dressed or grooming yourself? No   Do you have difficulty using the toilet? No   Do you have difficulty moving around from place to place? No   Do you have trouble with steps or getting out of a bed or a chair? No   Current Diet Well Balanced Diet   Dental Exam Up to date   Eye Exam Up to date   Exercise (times per week) 5 times per week   Current Exercises Include Other;Walking;House Cleaning   Do you need help using the phone?  No   Are you deaf or do you have serious difficulty hearing?  No   Do you need help to go to places out of walking distance? No   Do you need help shopping? No   Do you need help preparing meals?  No   Do you need help with housework?  No   Do you need help with laundry? No   Do you need help taking your medications? No   Do you need help managing money? No   Do you ever drive or ride in a car without wearing a seat belt? No   Have you felt unusual stress, anger or loneliness in the last month? No   Who do you live with? Alone   If you need help, do you have trouble finding someone available to you? No   Have you been bothered in the last four weeks by sexual problems? No   Do you have difficulty concentrating, remembering or making decisions? No           Age-appropriate Screening Schedule:  Refer to the list below for future screening recommendations based on patient's age, sex and/or medical conditions. Orders for these recommended tests are listed in the plan section. The patient has been provided with a written  plan.    Health Maintenance List  Health Maintenance   Topic Date Due    COVID-19 Vaccine (5 - 2024-25 season) 01/24/2025 (Originally 9/1/2024)    LIPID PANEL  11/01/2025    ANNUAL WELLNESS VISIT  12/17/2025    DXA SCAN  01/04/2026    RSV Vaccine - Adults  Completed    INFLUENZA VACCINE  Completed    Pneumococcal Vaccine 65+  Completed    ZOSTER VACCINE  Completed    TDAP/TD VACCINES  Discontinued                                                                                                                                                CMS Preventative Services Quick Reference  Risk Factors Identified During Encounter  Fall Risk-High or Moderate: Discussed Fall Prevention in the home, Information on Fall Prevention Shared in After Visit Summary, and Sit to Stand Exercise Information Shared in After Visit Summary    The above risks/problems have been discussed with the patient.  Pertinent information has been shared with the patient in the After Visit Summary.  An After Visit Summary and PPPS were made available to the patient.    Follow Up:   Next Medicare Wellness visit to be scheduled in 1 year.     Assessment & Plan  Medicare annual wellness visit, subsequent  Plan:  1.physical: Physical exam conducted today, reviewed fasting lab work,   Impression: healthy adult Patient. Currently, patient eats a healthy diet. Screening lab work includes glucose, lipid profile and complete blood count . The risks and benefits of immunizations were discussed and immunizations are up to date. Advice and education were given regarding nutrition and aerobic exercise. Patient discussion: discussed with the patient.  Annual Wellness Visit  with preventive exam as well as age and risk appropriate counseling completed.   Advance Directive Planning: paperwork and instructions were given to the patient.   Patient Discussion: plan discussed with the patient, follow-up visit needed in one year. Medication Review and medication list  updated              Follow Up:   Return in about 1 year (around 12/17/2025) for Medicare Wellness.

## 2024-12-17 NOTE — PATIENT INSTRUCTIONS
Advance Care Planning and Advance Directives     You make decisions on a daily basis - decisions about where you want to live, your career, your home, your life. Perhaps one of the most important decisions you face is your choice for future medical care. Take time to talk with your family and your healthcare team and start planning today.  Advance Care Planning is a process that can help you:  Understand possible future healthcare decisions in light of your own experiences  Reflect on those decision in light of your goals and values  Discuss your decisions with those closest to you and the healthcare professionals that care for you  Make a plan by creating a document that reflects your wishes    Surrogate Decision Maker  In the event of a medical emergency, which has left you unable to communicate or to make your own decisions, you would need someone to make decisions for you.  It is important to discuss your preferences for medical treatment with this person while you are in good health.     Qualities of a surrogate decision maker:  Willing to take on this role and responsibility  Knows what you want for future medical care  Willing to follow your wishes even if they don't agree with them  Able to make difficult medical decisions under stressful circumstances    Advance Directives  These are legal documents you can create that will guide your healthcare team and decision maker(s) when needed. These documents can be stored in the electronic medical record.    Living Will - a legal document to guide your care if you have a terminal condition or a serious illness and are unable to communicate. States vary by statute in document names/types, but most forms may include one or more of the following:        -  Directions regarding life-prolonging treatments        -  Directions regarding artificially provided nutrition/hydration        -  Choosing a healthcare decision maker        -  Direction regarding organ/tissue  donation    Durable Power of  for Healthcare - this document names an -in-fact to make medical decisions for you, but it may also allow this person to make personal and financial decisions for you. Please seek the advice of an  if you need this type of document.    **Advance Directives are not required and no one may discriminate against you if you do not sign one.    Medical Orders  Many states allow specific forms/orders signed by your physician to record your wishes for medical treatment in your current state of health. This form, signed in personal communication with your physician, addresses resuscitation and other medical interventions that you may or may not want.      For more information or to schedule a time with a [facility] Advance Care Planning Facilitator contact: [email or web address] or call [phone] and someone will contact you directly.    Medicare Wellness  Personal Prevention Plan of Service     Date of Office Visit:    Encounter Provider:  Tiera Freed MD  Place of Service:  [facility]  Patient Name: Fanta Rodney  :  1935    As part of the Medicare Wellness portion of your visit today, we are providing you with this personalized preventive plan of services (PPPS). This plan is based upon recommendations of the [place] Preventive Services Task Force (USPSTF) and the Advisory Committee on Immunization Practices (ACIP).    This lists the preventive care services that should be considered, and provides dates of when you are due. Items listed as completed are up-to-date and do not require any further intervention.    Health Maintenance   Topic Date Due   • COVID-19 Vaccine (2024- season) 2025 (Originally 2024)   • LIPID PANEL  2025   • ANNUAL WELLNESS VISIT  2025   • DXA SCAN  2026   • RSV Vaccine - Adults  Completed   • INFLUENZA VACCINE  Completed   • Pneumococcal Vaccine 65+   Completed   • ZOSTER VACCINE  Completed   • TDAP/TD VACCINES  Discontinued       No orders of the defined types were placed in this encounter.      Return in about 1 year (around 12/17/2025) for Medicare Wellness.

## 2025-03-01 LAB
ALBUMIN SERPL-MCNC: 3.9 G/DL (ref 3.5–5.2)
ALBUMIN/GLOB SERPL: 1.4 G/DL
ALP SERPL-CCNC: 63 U/L (ref 39–117)
ALT SERPL-CCNC: 8 U/L (ref 1–33)
AST SERPL-CCNC: 18 U/L (ref 1–32)
BILIRUB SERPL-MCNC: 0.6 MG/DL (ref 0–1.2)
BUN SERPL-MCNC: 15 MG/DL (ref 8–23)
BUN/CREAT SERPL: 19.5 (ref 7–25)
CALCIUM SERPL-MCNC: 9.7 MG/DL (ref 8.6–10.5)
CHLORIDE SERPL-SCNC: 109 MMOL/L (ref 98–107)
CHOLEST SERPL-MCNC: 120 MG/DL (ref 0–200)
CO2 SERPL-SCNC: 26.3 MMOL/L (ref 22–29)
CREAT SERPL-MCNC: 0.77 MG/DL (ref 0.57–1)
EGFRCR SERPLBLD CKD-EPI 2021: 74.8 ML/MIN/1.73
ERYTHROCYTE [DISTWIDTH] IN BLOOD BY AUTOMATED COUNT: 12.6 % (ref 12.3–15.4)
GLOBULIN SER CALC-MCNC: 2.8 GM/DL
GLUCOSE SERPL-MCNC: 89 MG/DL (ref 65–99)
HCT VFR BLD AUTO: 43 % (ref 34–46.6)
HDLC SERPL-MCNC: 55 MG/DL (ref 40–60)
HGB BLD-MCNC: 14.4 G/DL (ref 12–15.9)
LDLC SERPL CALC-MCNC: 48 MG/DL (ref 0–100)
MCH RBC QN AUTO: 32.6 PG (ref 26.6–33)
MCHC RBC AUTO-ENTMCNC: 33.5 G/DL (ref 31.5–35.7)
MCV RBC AUTO: 97.3 FL (ref 79–97)
PLATELET # BLD AUTO: 249 10*3/MM3 (ref 140–450)
POTASSIUM SERPL-SCNC: 4.4 MMOL/L (ref 3.5–5.2)
PROT SERPL-MCNC: 6.7 G/DL (ref 6–8.5)
RBC # BLD AUTO: 4.42 10*6/MM3 (ref 3.77–5.28)
SODIUM SERPL-SCNC: 145 MMOL/L (ref 136–145)
TRIGL SERPL-MCNC: 89 MG/DL (ref 0–150)
TSH SERPL DL<=0.005 MIU/L-ACNC: 0.48 UIU/ML (ref 0.27–4.2)
VLDLC SERPL CALC-MCNC: 17 MG/DL (ref 5–40)
WBC # BLD AUTO: 6.25 10*3/MM3 (ref 3.4–10.8)

## 2025-03-06 ENCOUNTER — OFFICE VISIT (OUTPATIENT)
Dept: INTERNAL MEDICINE | Facility: CLINIC | Age: 89
End: 2025-03-06
Payer: MEDICARE

## 2025-03-06 VITALS
TEMPERATURE: 96.2 F | BODY MASS INDEX: 21.99 KG/M2 | RESPIRATION RATE: 18 BRPM | HEART RATE: 90 BPM | HEIGHT: 63 IN | SYSTOLIC BLOOD PRESSURE: 121 MMHG | DIASTOLIC BLOOD PRESSURE: 78 MMHG | WEIGHT: 124.12 LBS | OXYGEN SATURATION: 99 %

## 2025-03-06 DIAGNOSIS — Z23 NEED FOR PNEUMOCOCCAL VACCINATION: ICD-10-CM

## 2025-03-06 DIAGNOSIS — E03.8 ADULT ONSET HYPOTHYROIDISM: ICD-10-CM

## 2025-03-06 DIAGNOSIS — E78.2 MIXED HYPERLIPIDEMIA: Primary | ICD-10-CM

## 2025-03-06 RX ORDER — ROSUVASTATIN CALCIUM 10 MG/1
10 TABLET, COATED ORAL NIGHTLY
Qty: 90 TABLET | Refills: 1 | Status: SHIPPED | OUTPATIENT
Start: 2025-03-06

## 2025-03-06 RX ORDER — LEVOTHYROXINE SODIUM 100 UG/1
100 TABLET ORAL DAILY
Qty: 90 TABLET | Refills: 1 | Status: SHIPPED | OUTPATIENT
Start: 2025-03-06

## 2025-03-06 NOTE — PROGRESS NOTES
"Chief Complaint  Hyperlipidemia and Hypothyroidism    Subjective        Fanta Rodney presents to [facility]  HPI: Patient is here to follow up on  cholesterol and   thyroid and is  due to get lab work done .  The patient also needs refills on medications and Prevnar 20.   Hyperlipidemia   Pertinent negatives include no chest pain or shortness of breath.    Answers submitted by the patient for this visit:  Problem not listed (Submitted on 2/27/2025)  Chief Complaint: Other medical problem  Reason for appointment: Follow up  abdominal pain: No  anorexia: No  joint pain: Yes  chest pain: No  chills: No  nasal congestion: No  cough: No  diaphoresis: No  fatigue: No  fever: No  headaches: No  joint swelling: Yes  nausea: No  neck pain: No  numbness: No  rash: No  sore throat: No  swollen glands: No  vertigo: No  visual change: No  vomiting: No  weakness: No        Objective   Vital Signs:  /78   Pulse 90   Temp 96.2 °F (35.7 °C) (Temporal)   Resp 18   Ht 160 cm (63\")   Wt 56.3 kg (124 lb 1.9 oz)   SpO2 99%   BMI 21.99 kg/m²   Estimated body mass index is 21.99 kg/m² as calculated from the following:    Height as of this encounter: 160 cm (63\").    Weight as of this encounter: 56.3 kg (124 lb 1.9 oz).    BMI is within normal parameters. No other follow-up for BMI required.      Physical Exam  Vitals and nursing note reviewed.   Constitutional:       General: She is not in acute distress.     Appearance: Normal appearance. She is not diaphoretic.   HENT:      Head: Normocephalic and atraumatic.      Right Ear: External ear normal.      Left Ear: External ear normal.      Nose: Nose normal.   Eyes:      Extraocular Movements: Extraocular movements intact.      Conjunctiva/sclera: Conjunctivae normal.   Neck:      Trachea: Trachea normal.   Cardiovascular:      Rate and Rhythm: Normal rate and regular rhythm.      Heart sounds: Normal heart sounds.   Pulmonary:      Effort: " Pulmonary effort is normal. No respiratory distress.      Breath sounds: Normal breath sounds.   Abdominal:      General: Abdomen is flat.   Musculoskeletal:         General: Deformity present.      Cervical back: Neck supple.      Comments: Moves all limbs   Skin:     General: Skin is warm.   Neurological:      Mental Status: She is alert and oriented to person, place, and time.      Comments: No gross motor or sensory deficits        Result Review :  The following data was reviewed by: Tiera Freed MD on 03/06/2025:  Common labs          7/26/2024    09:07 11/1/2024    08:00 2/28/2025    09:43   Common Labs   Glucose 89  88  89    BUN 11  17  15    Creatinine 0.86  0.86  0.77    Sodium 143  140  145    Potassium 4.8  4.7  4.4    Chloride 107  104  109    Calcium 9.5  9.5  9.7    Albumin 4.0  3.9  3.9    Total Bilirubin 0.6  0.6  0.6    Alkaline Phosphatase 66  58  63    AST (SGOT) 17  21  18    ALT (SGPT) 11  15  8    WBC 6.80  6.49  6.25    Hemoglobin 13.8  14.4  14.4    Hematocrit 42.8  43.7  43.0    Platelets 262  253  249    Total Cholesterol 120  138  120    Triglycerides 89  100  89    HDL Cholesterol 56  59  55    LDL Cholesterol  47  61  48                Assessment and Plan   Diagnoses and all orders for this visit:    1. Mixed hyperlipidemia (Primary)  -     rosuvastatin (CRESTOR) 10 MG tablet; Take 1 tablet by mouth Every Night.  Dispense: 90 tablet; Refill: 1  -     CBC (No Diff)  -     Comprehensive Metabolic Panel  -     Lipid Panel    2. Adult onset hypothyroidism  -     levothyroxine (SYNTHROID, LEVOTHROID) 100 MCG tablet; Take 1 tablet by mouth Daily.  Dispense: 90 tablet; Refill: 1  -     TSH    3. Need for pneumococcal vaccination    Other orders  -     Pneumococcal Conjugate Vaccine 20-Valent (PCV20)      Plan:  1. mixed hyperlipidemia:  reviewed  fasting CMP and lipid panel.  Diet and exercise counseled,  Will continue current medications  2.  hypothyroidism:  reviewed  tsh , and continue  levothyroxine  3. Need for prevnar 20 : given today and well tolerated       Follow Up   Return in about 6 months (around 8/26/2025).  Patient was given instructions and counseling regarding her condition or for health maintenance advice. Please see specific information pulled into the AVS if appropriate.

## 2025-05-12 ENCOUNTER — OFFICE VISIT (OUTPATIENT)
Dept: INTERNAL MEDICINE | Facility: CLINIC | Age: 89
End: 2025-05-12
Payer: MEDICARE

## 2025-05-12 VITALS
BODY MASS INDEX: 21.79 KG/M2 | HEART RATE: 92 BPM | DIASTOLIC BLOOD PRESSURE: 78 MMHG | WEIGHT: 123 LBS | OXYGEN SATURATION: 98 % | HEIGHT: 63 IN | TEMPERATURE: 97.8 F | SYSTOLIC BLOOD PRESSURE: 102 MMHG | RESPIRATION RATE: 18 BRPM

## 2025-05-12 DIAGNOSIS — L30.9 CHRONIC ECZEMA: Primary | ICD-10-CM

## 2025-05-12 DIAGNOSIS — E86.1 HYPOTENSION DUE TO HYPOVOLEMIA: ICD-10-CM

## 2025-05-12 PROCEDURE — 1126F AMNT PAIN NOTED NONE PRSNT: CPT | Performed by: STUDENT IN AN ORGANIZED HEALTH CARE EDUCATION/TRAINING PROGRAM

## 2025-05-12 PROCEDURE — 99214 OFFICE O/P EST MOD 30 MIN: CPT | Performed by: STUDENT IN AN ORGANIZED HEALTH CARE EDUCATION/TRAINING PROGRAM

## 2025-05-12 RX ORDER — CHOLECALCIFEROL (VITAMIN D3) 25 MCG
1000 TABLET ORAL DAILY
COMMUNITY

## 2025-05-12 RX ORDER — TRIAMCINOLONE ACETONIDE 1 MG/G
1 OINTMENT TOPICAL 2 TIMES DAILY
Qty: 30 G | Refills: 0 | Status: SHIPPED | OUTPATIENT
Start: 2025-05-12

## 2025-05-12 NOTE — PROGRESS NOTES
Office Note     Name: Fanta Rodney    : 1935     MRN: [id number]     Chief Complaint  Hypotension (Low BP readings at  25) and Rash (Has had for several months after getting flu and RSV vaccines)    Subjective     History of Present Illness:  Fanta Rodney is a 89 y.o. female who presents today for hypotension and rash    Low blood pressure: went to  and was 80s/40 and today, mildly low at 102/78. She only drinks 3 glasses of water a day (24oz/day). She has been checking her BP at home the past 2 days, noting 120-128/70-75  Also complains of itching rash on chest, red and always scratches      Family History:   Family History   Problem Relation Age of Onset    Colon cancer Mother         diagnosed in her late 80's    Cancer Mother     Osteoarthritis Mother     Cancer Father     Prostate cancer Father        Social History:   Social History     Socioeconomic History    Marital status:    Tobacco Use    Smoking status: Never    Smokeless tobacco: Never   Vaping Use    Vaping status: Never Used   Substance and Sexual Activity    Alcohol use: Never    Drug use: Never    Sexual activity: Not Currently     Partners: Male       Health Maintenance   Topic Date Due    COVID-19 Vaccine (2024- season) 2025 (Originally 2024)    INFLUENZA VACCINE  2025    ANNUAL WELLNESS VISIT  2025    DXA SCAN  2026    LIPID PANEL  2026    RSV Vaccine - Adults  Completed    Pneumococcal Vaccine 50+  Completed    ZOSTER VACCINE  Completed    TDAP/TD VACCINES  Discontinued       Patient Care Team:  Tiera Freed MD as PCP - General (Internal Medicine)  Ernie Carson APRN as Nurse Practitioner (Gastroenterology)  Alistair Sanabria DPM as Consulting Physician (Podiatry)  Gil Baumann PA-C as Physician Assistant (Physician Assistant)  Alisha Weinberg MD as Consulting Physician (Ophthalmology)    Objective     Vital Signs  /78   Pulse 92   Temp  "97.8 °F (36.6 °C) (Infrared)   Resp 18   Ht 160 cm (62.99\")   Wt 55.8 kg (123 lb)   SpO2 98%   BMI 21.79 kg/m²   Estimated body mass index is 21.79 kg/m² as calculated from the following:    Height as of this encounter: 160 cm (62.99\").    Weight as of this encounter: 55.8 kg (123 lb).  BMI is within normal parameters. No other follow-up for BMI required.    Physical Exam  Skin:     Comments: Erythematous papules, pruritic with some lichenification          Procedures     Assessment and Plan     Diagnoses and all orders for this visit:    1. Chronic eczema (Primary)  Assessment & Plan:  Acute flare, start triamcinolone cream and cerave moisturizer twice daily    Orders:  -     triamcinolone (KENALOG) 0.1 % ointment; Apply 1 Application topically to the appropriate area as directed 2 (Two) Times a Day.  Dispense: 30 g; Refill: 0    2. Hypotension due to hypovolemia  Assessment & Plan:  Due to dehydration. New problem. Advised to increase daily water intake to about 8 glasses of water per day  Advised ambulatory blood pressure monitoring about 4-5x/week about 2x/day and call clinic if with concerns.            Counseling was given to patient for the following topics: instructions for management, risks and benefits of treatment options, and importance of treatment compliance.    Follow Up  No follow-ups on file.    MD AGUSTIN Huddleston [facility]  [address] [place] [id number] [phone]  "

## 2025-05-12 NOTE — ASSESSMENT & PLAN NOTE
Due to dehydration. New problem. Advised to increase daily water intake to about 8 glasses of water per day  Advised ambulatory blood pressure monitoring about 4-5x/week about 2x/day and call clinic if with concerns.

## 2025-08-26 ENCOUNTER — OFFICE VISIT (OUTPATIENT)
Dept: INTERNAL MEDICINE | Facility: CLINIC | Age: 89
End: 2025-08-26
Payer: MEDICARE

## 2025-08-26 VITALS
RESPIRATION RATE: 18 BRPM | HEART RATE: 77 BPM | OXYGEN SATURATION: 99 % | SYSTOLIC BLOOD PRESSURE: 98 MMHG | HEIGHT: 63 IN | BODY MASS INDEX: 21.44 KG/M2 | TEMPERATURE: 98.2 F | WEIGHT: 121 LBS | DIASTOLIC BLOOD PRESSURE: 63 MMHG

## 2025-08-26 DIAGNOSIS — R19.7 DIARRHEA, UNSPECIFIED TYPE: ICD-10-CM

## 2025-08-26 DIAGNOSIS — E78.2 MIXED HYPERLIPIDEMIA: ICD-10-CM

## 2025-08-26 DIAGNOSIS — R63.4 WEIGHT LOSS: ICD-10-CM

## 2025-08-26 DIAGNOSIS — I95.9 TRANSIENT HYPOTENSION: Primary | ICD-10-CM

## 2025-08-26 DIAGNOSIS — E03.8 ADULT ONSET HYPOTHYROIDISM: ICD-10-CM

## 2025-08-26 RX ORDER — ROSUVASTATIN CALCIUM 10 MG/1
10 TABLET, COATED ORAL NIGHTLY
Qty: 90 TABLET | Refills: 1 | Status: SHIPPED | OUTPATIENT
Start: 2025-08-26

## 2025-08-26 RX ORDER — LEVOTHYROXINE SODIUM 100 UG/1
100 TABLET ORAL DAILY
Qty: 90 TABLET | Refills: 1 | Status: SHIPPED | OUTPATIENT
Start: 2025-08-26

## 2025-08-27 LAB
ALBUMIN SERPL-MCNC: 3.8 G/DL (ref 3.5–5.2)
ALBUMIN/GLOB SERPL: 1.4 G/DL
ALP SERPL-CCNC: 67 U/L (ref 39–117)
ALT SERPL-CCNC: 8 U/L (ref 1–33)
AST SERPL-CCNC: 18 U/L (ref 1–32)
BILIRUB SERPL-MCNC: 0.5 MG/DL (ref 0–1.2)
BUN SERPL-MCNC: 15 MG/DL (ref 8–23)
BUN/CREAT SERPL: 18.1 (ref 7–25)
CALCIUM SERPL-MCNC: 9.4 MG/DL (ref 8.6–10.5)
CHLORIDE SERPL-SCNC: 106 MMOL/L (ref 98–107)
CHOLEST SERPL-MCNC: 105 MG/DL (ref 0–200)
CO2 SERPL-SCNC: 24.9 MMOL/L (ref 22–29)
CREAT SERPL-MCNC: 0.83 MG/DL (ref 0.57–1)
EGFRCR SERPLBLD CKD-EPI 2021: 68.3 ML/MIN/1.73
ERYTHROCYTE [DISTWIDTH] IN BLOOD BY AUTOMATED COUNT: 12.1 % (ref 12.3–15.4)
GLOBULIN SER CALC-MCNC: 2.7 GM/DL
GLUCOSE SERPL-MCNC: 84 MG/DL (ref 65–99)
HCT VFR BLD AUTO: 41.8 % (ref 34–46.6)
HDLC SERPL-MCNC: 55 MG/DL (ref 40–60)
HGB BLD-MCNC: 13.6 G/DL (ref 12–15.9)
LDLC SERPL CALC-MCNC: 36 MG/DL (ref 0–100)
MCH RBC QN AUTO: 31.3 PG (ref 26.6–33)
MCHC RBC AUTO-ENTMCNC: 32.5 G/DL (ref 31.5–35.7)
MCV RBC AUTO: 96.3 FL (ref 79–97)
PLATELET # BLD AUTO: 284 10*3/MM3 (ref 140–450)
POTASSIUM SERPL-SCNC: 4.7 MMOL/L (ref 3.5–5.2)
PROT SERPL-MCNC: 6.5 G/DL (ref 6–8.5)
RBC # BLD AUTO: 4.34 10*6/MM3 (ref 3.77–5.28)
SODIUM SERPL-SCNC: 143 MMOL/L (ref 136–145)
TRIGL SERPL-MCNC: 65 MG/DL (ref 0–150)
TSH SERPL DL<=0.005 MIU/L-ACNC: 0.55 UIU/ML (ref 0.27–4.2)
VLDLC SERPL CALC-MCNC: 14 MG/DL (ref 5–40)
WBC # BLD AUTO: 5.69 10*3/MM3 (ref 3.4–10.8)

## (undated) DEVICE — DISPOSABLE TOURNIQUET CUFF SINGLE BLADDER, DUAL PORT AND QUICK CONNECT CONNECTOR: Brand: COLOR CUFF

## (undated) DEVICE — BOWL AND CEMENT CARTRIDGE WITH BREAKAWAY FEMORAL NOZZLE AND MEDIUM PRESSURIZER: Brand: ACM

## (undated) DEVICE — 2108 SERIES SAGITTAL BLADE, NO OFFSET  (24.8 X 1.24 X 80.1MM)

## (undated) DEVICE — SPNG LAP 18X18IN LF STRL PK/5

## (undated) DEVICE — FLEXIBLE YANKAUER,MEDIUM TIP, NO VACUUM CONTROL: Brand: ARGYLE

## (undated) DEVICE — SUT VIC 2/0 CT1 27IN J259H

## (undated) DEVICE — SHEET,DRAPE,70X100,STERILE: Brand: MEDLINE

## (undated) DEVICE — CUFF SCD HEMOFORCE SEQ CALF STD MD

## (undated) DEVICE — VIOLET BRAIDED (POLYGLACTIN 910), SYNTHETIC ABSORBABLE SUTURE: Brand: COATED VICRYL

## (undated) DEVICE — 3M™ STERI-DRAPE™ U-DRAPE 1015: Brand: STERI-DRAPE™

## (undated) DEVICE — GLV SURG TRIUMPH ORTHO W/ALOE PF LTX 8 STRL

## (undated) DEVICE — DRSNG WND BORDR/ADHS NONADHR/GZ LF 4X10IN STRL

## (undated) DEVICE — PK KN TOTL 20

## (undated) DEVICE — Device: Brand: TCP

## (undated) DEVICE — HANDPIECE SET WITH HIGH FLOW TIP AND SUCTION TUBE: Brand: INTERPULSE

## (undated) DEVICE — PROXIMATE SKIN STAPLERS (35 WIDE) CONTAINS 35 STAINLESS STEEL STAPLES (FIXED HEAD): Brand: PROXIMATE

## (undated) DEVICE — SPNG GZ WOVN 4X4IN 12PLY 10/BX STRL

## (undated) DEVICE — 1000 S-DRAPE TOWEL DRAPE 10/BX: Brand: STERI-DRAPE™

## (undated) DEVICE — DRSNG SURESITE123 6X8IN

## (undated) DEVICE — GLV SURG SENSICARE W/ALOE PF LF 8 STRL

## (undated) DEVICE — OCCLUSIVE GAUZE STRIP,3% BISMUTH TRIBROMOPHENATE IN PETROLATUM BLEND: Brand: XEROFORM